# Patient Record
Sex: FEMALE | Race: BLACK OR AFRICAN AMERICAN | NOT HISPANIC OR LATINO | Employment: UNEMPLOYED | URBAN - METROPOLITAN AREA
[De-identification: names, ages, dates, MRNs, and addresses within clinical notes are randomized per-mention and may not be internally consistent; named-entity substitution may affect disease eponyms.]

---

## 2017-01-05 ENCOUNTER — TELEPHONE (OUTPATIENT)
Dept: NEUROLOGY | Facility: CLINIC | Age: 37
End: 2017-01-05

## 2017-01-05 NOTE — TELEPHONE ENCOUNTER
----- Message from Lisa Vela sent at 1/4/2017  3:52 PM CST -----  Contact: Vicki (Mom) [phone]  Mom is calling to book an appointment for daughter to see doctor mery peter call

## 2017-01-06 ENCOUNTER — TELEPHONE (OUTPATIENT)
Dept: NEUROLOGY | Facility: CLINIC | Age: 37
End: 2017-01-06

## 2017-01-06 NOTE — TELEPHONE ENCOUNTER
----- Message from Karmen Lao RN sent at 1/5/2017  4:58 PM CST -----  Contact: Patient's mother      ----- Message -----     From: Marissa Lisa     Sent: 1/5/2017   2:41 PM       To: Carlos BALDWIN Staff    Patient's mother returning call  About daughter's appointment.    Please note, mother mentioned patient now has medicaid    Please call her at [phone]

## 2017-01-06 NOTE — TELEPHONE ENCOUNTER
----- Message from Karmen Lao RN sent at 1/5/2017  4:58 PM CST -----  Contact: Patient's mother      ----- Message -----     From: Marissa Lisa     Sent: 1/5/2017   2:41 PM       To: Carlos BALDWIN Staff    Patient's mother returning call  About daughter's appointment.    Please note, mother mentioned patient now has medicaid    Please call her at [phone]

## 2017-01-27 ENCOUNTER — TELEPHONE (OUTPATIENT)
Dept: NEUROLOGY | Facility: CLINIC | Age: 37
End: 2017-01-27

## 2017-01-27 NOTE — TELEPHONE ENCOUNTER
----- Message from Kvng Hines sent at 1/26/2017  4:11 PM CST -----  Contact: Mother  Would like to schedule an appt, please call: [phone]

## 2017-01-27 NOTE — TELEPHONE ENCOUNTER
Call returned to Vicki (mother). Bettye has not been seen here since 3/2015 due to transportation issues, which have been resolved. She never did start the Copaxone that was prescribed back then. She is not currently having any acute issues but wanted to reestablish care here. She has not seen another neurologist for her MS and is not on DMT. She is scheduled for next available on 3/8/17 at 3pm. Appt reminder also postal mailed to them.

## 2017-03-08 ENCOUNTER — OFFICE VISIT (OUTPATIENT)
Dept: NEUROLOGY | Facility: CLINIC | Age: 37
End: 2017-03-08
Payer: MEDICAID

## 2017-03-08 VITALS
HEIGHT: 63 IN | DIASTOLIC BLOOD PRESSURE: 72 MMHG | HEART RATE: 85 BPM | SYSTOLIC BLOOD PRESSURE: 112 MMHG | WEIGHT: 173 LBS | BODY MASS INDEX: 30.65 KG/M2

## 2017-03-08 DIAGNOSIS — H49.20 6TH NERVE PALSY, UNSPECIFIED LATERALITY: ICD-10-CM

## 2017-03-08 DIAGNOSIS — Z71.89 COUNSELING REGARDING GOALS OF CARE: ICD-10-CM

## 2017-03-08 DIAGNOSIS — G35 MS (MULTIPLE SCLEROSIS): ICD-10-CM

## 2017-03-08 DIAGNOSIS — Z79.899 ENCOUNTER FOR LONG-TERM (CURRENT) USE OF HIGH-RISK MEDICATION: ICD-10-CM

## 2017-03-08 DIAGNOSIS — Z29.89 PROPHYLACTIC IMMUNOTHERAPY: ICD-10-CM

## 2017-03-08 DIAGNOSIS — G35 MULTIPLE SCLEROSIS EXACERBATION: Primary | ICD-10-CM

## 2017-03-08 PROCEDURE — 99999 PR PBB SHADOW E&M-EST. PATIENT-LVL III: CPT | Mod: PBBFAC,,, | Performed by: PSYCHIATRY & NEUROLOGY

## 2017-03-08 PROCEDURE — 99213 OFFICE O/P EST LOW 20 MIN: CPT | Mod: PBBFAC | Performed by: PSYCHIATRY & NEUROLOGY

## 2017-03-08 PROCEDURE — 99215 OFFICE O/P EST HI 40 MIN: CPT | Mod: S$PBB,,, | Performed by: PSYCHIATRY & NEUROLOGY

## 2017-03-08 RX ORDER — DEXAMETHASONE 6 MG/1
TABLET ORAL
Qty: 39 TABLET | Refills: 0 | Status: SHIPPED | OUTPATIENT
Start: 2017-03-08 | End: 2017-08-03 | Stop reason: SDUPTHER

## 2017-03-08 RX ORDER — METHYLPREDNISOLONE SODIUM SUCCINATE 1 G/16ML
INJECTION INTRAMUSCULAR; INTRAVENOUS
Qty: 3000 MG | Refills: 0 | Status: SHIPPED | OUTPATIENT
Start: 2017-03-08 | End: 2017-03-08

## 2017-03-08 NOTE — PROGRESS NOTES
Subjective:       Patient ID: Bettye Ch is a 36 y.o. female who presents today for a f/u for MS.  She is accompanied by her parents  MS HPI:  · DMT: N/A  · Side effects from DMT?   · Taking vitamin D3 as recommended? No Dose:   · I first saw pt 2 years ago in consultation.  We'd recommended Copaxone, but pt did not start it. She explains that she did not want to do the shots.   · Her parents bring her in today    SOCIAL HISTORY  Social History   Substance Use Topics    Smoking status: Never Smoker    Smokeless tobacco: Never Used    Alcohol use No     Living arrangements - the patient lives with her parents.  Employment not employed      Objective:        25 foot timed walk:  6.23 seconds without assist; in 3/2015, was 5.55 without assist;     Neurologic Exam      MENTAL STATUS:  There is moderate bradyphrenia; however, language is grossly    fluent with normal verbal comprehension, short-term and remote memory are    intact, attention is normal, patient is alert and oriented x3, fund of knowledge   appears grossly intact.  CRANIAL NERVE EXAM:  she has right 6th n paresis today; slight weakness of right face'   MOTOR EXAM:  She has slightly slow rapid sequential movements in the hands    bilaterally.  Upper extremity strength is grossly full.  Right and left lower    extremity flexors have mild weakness in all groups, graded at about 5-/5 in all    groups.  SENSORY EXAM:  She has a mild decrease in vibration sense in the distal lower    extremities bilaterally.  REFLEXES:  Are 2+ and symmetric throughout.  She has a left Babinski.  Right toe   is down.  COORDINATION:  She has slight dystaxia on finger-to-nose bilaterally.  GAIT:  Slightly wide based. Slower than at last visit      Labs:     Lab Results   Component Value Date    YHBNJRXV20WZ 17 (L) 03/24/2015    KJCMTFGC84OY <6.0 (L) 10/22/2010     Lab Results   Component Value Date    JCVINDEX SEE COMMENT (A) 03/24/2015    JCVANTIBODY SEE COMMENT  03/24/2015       Diagnosis/Assessment/Plan:    1. Multiple Sclerosis  · Assessment: I had a prolonged discussion with the pt and her parents about need for immunotherapy given the fact that her MRI 2 years ago showed diffuse julia positivity, given fact that she appears to having MS relapse now (eye movement restriction) and given fact that her timed walk is slower .   · Imaging: will order new MRIs of brain and C spine in [place]  · Disease Modifying Therapies: will treat acutely with 3 days of pulse high dose steroids; will start Rituxan as immunotherapy.  Risks, rationale and expectations of Rituxan therapy discussed with pt and her family; The patient was counseled about the risks associated with immune suppressive therapy, including a higher risk of serious infections and malignancy, as well as the importance of avoiding all live virus vaccines while on immune suppressive medication. The the patient was counseled about the importance of vitamin D supplementation in multiple sclerosis, and the fact that recent data suggests that high circulating blood levels of vitamin D has an ameliorating effect on the disease course in multiple sclerosis in general.        2. MS Symptom Assessment / Management--will be addressed in detail at next f/u appt    F/u Anju Christopher CNS in 8 weeks.  Ms Christopher was present for part of visit today.       Over 50% of this 40 minute visit was spent in direct face to face counseling of the patient and her family about her MS and rationale for starting immunotherapy. .        There are no diagnoses linked to this encounter.

## 2017-03-08 NOTE — MR AVS SNAPSHOT
[facility]  [address]  [place] [address]  Phone: [phone]                  Bettye Ch   3/8/2017 3:00 PM   Office Visit    Description:  Female : 1980   Provider:  Louise Gonzalez MD   Department:  [facility]           Reason for Visit     Neurologic Problem           Diagnoses this Visit        Comments    Multiple sclerosis exacerbation    -  Primary     MS (multiple sclerosis)                To Do List           Goals (5 Years of Data)     None       These Medications        Disp Refills Start End    dexamethasone (DECADRON) 6 MG tablet 39 tablet 0 3/8/2017     Take 13 tab po daily (all at once) x 3 days    Pharmacy: [facility] - [place], [place] - [address] Ph #: [phone]         [facility]     [facility] Care [facility] -  Assistance  Registered nurses in the [facility] provide clinical advisement, health education, appointment booking, and other advisory services.  Call for this free service at [phone].             Medications           Message regarding Medications     Verify the changes and/or additions to your medication regime listed below are the same as discussed with your clinician today.  If any of these changes or additions are incorrect, please notify your healthcare provider.        START taking these NEW medications        Refills    dexamethasone (DECADRON) 6 MG tablet 0    Sig: Take 13 tab po daily (all at once) x 3 days    Class: Normal           Verify that the below list of medications is an accurate representation of the medications you are currently taking.  If none reported, the list may be blank. If incorrect, please contact your healthcare provider. Carry this list with you in case of emergency.           Current Medications     dexamethasone (DECADRON) 6 MG tablet Take 13 tab po daily (all at once) x 3 days     "ergocalciferol (VITAMIN D2) 50,000 unit Cap Take 1 capsule (50,000 Units total) by mouth every 7 days.           Clinical Reference Information           Your Vitals Were     BP Pulse Height Weight BMI    112/72 85 5' 3" (1.6 m) 78.5 kg (173 lb) 30.65 kg/m2      Blood Pressure          Most Recent Value    BP  112/72      Allergies as of 3/8/2017     No Known Allergies      Immunizations Administered on Date of Encounter - 3/8/2017     None      Orders Placed During Today's Visit     Future Labs/Procedures Expected by Expires    CBC auto differential  3/8/2017 5/7/2018    HELPER-SUPPRESSOR RATIO  3/8/2017 5/7/2018    Hepatitis A antibody, IgG  3/8/2017 5/7/2018    Hepatitis B core antibody, total  3/8/2017 5/7/2018    Hepatitis B surface antibody  3/8/2017 5/7/2018    HEPATITIS B SURFACE ANTIGEN  3/8/2017 5/7/2018    HEPATITIS C ANTIBODY  3/8/2017 5/7/2018    HIV-1 and HIV-2 antibodies  3/8/2017 5/7/2018    QUANTIFERON GOLD TB  3/8/2017 5/7/2018    Vitamin B12  3/8/2017 5/7/2018    Comprehensive metabolic panel  As directed 3/8/2018    MRI Brain W WO Contrast  As directed 3/8/2018    MRI Cervical Spine W WO Cont  As directed 3/8/2018    Vitamin D  As directed 3/8/2018      Language Assistance Services     ATTENTION: Language assistance services are available, free of charge. Please call [phone].      ATENCIÓN: Si habla español, tiene a kapoor disposición servicios gratuitos de asistencia lingüística. Llame al [phone].     [facility] Ý: N?u b?n nói Ti?ng Vi?t, có các d?ch v? h? tr? ngôn ng? mi?n phí dành cho b?n. G?i s? [phone].         [facility] complies with applicable Federal civil rights laws and does not discriminate on the basis of race, color, national origin, age, disability, or sex.        "

## 2017-03-08 NOTE — PROGRESS NOTES
I met with Ms. Bettye Ch and her parents after her appointment with CATHERINE Gonzalez MD.  Her mother did remember speaking to the previous MS  and did follow through with recommendations that were provided.  Ms. Ch does have Medicaid and is receiving food stamps.  She applied for SSI but was denied because the family was not agreeable to completing a psychological evaluation per the recommendation of Social Security.  I explained the reason for the evaluation and how it could assist SSA in making a decision about her disability.  Ms. Ch may have time to appeal the SSI denial, so I encouraged the family to follow the instructions on the appeal letter, immediately so as not to lose the right to appeal.  I encouraged them to reconsider the psychological evaluation, and I encouraged them to consider obtaining legal representation.  My contact information was provided along with a jury duty excusal letter.

## 2017-03-09 ENCOUNTER — DOCUMENTATION ONLY (OUTPATIENT)
Dept: NEUROLOGY | Facility: CLINIC | Age: 37
End: 2017-03-09

## 2017-03-09 ENCOUNTER — TELEPHONE (OUTPATIENT)
Dept: NEUROLOGY | Facility: CLINIC | Age: 37
End: 2017-03-09

## 2017-03-09 NOTE — PROGRESS NOTES
Called mother , Marcy Ch to inform her that I was able to get Bettye in for her MRI's next week on 3/15 @ 12:30pm ([facility]) and also to remind her to get labs ([facility]) done prior before 10am. F/U in 8wks w/AP has been scheduled and will be mailed out to patient on 3/9/17.-KD

## 2017-03-09 NOTE — TELEPHONE ENCOUNTER
Labs scheduled on 3/15. Once labs reviewed, please let me know if okay to initiate Rituxan therapy plan.

## 2017-03-10 ENCOUNTER — TELEPHONE (OUTPATIENT)
Dept: NEUROLOGY | Facility: CLINIC | Age: 37
End: 2017-03-10

## 2017-03-10 NOTE — TELEPHONE ENCOUNTER
Incoming call from pt's mother, who is working on appealing pt's SSI denial.  She voiced concerns about the privacy of pt's records and I explained HIPPA and confidentiality.  She also had concerns about Dr. Gonzalez requesting HIV testing and I explained that certain testing must be completed prior to initiating certain medications, for the patient's safety.  Mother expressed understanding.

## 2017-03-10 NOTE — TELEPHONE ENCOUNTER
Returned call, Vicki said she had already confirmed dates and times of labs and MRI with another employee.

## 2017-03-10 NOTE — TELEPHONE ENCOUNTER
----- Message from Lisa Vela sent at 3/10/2017  8:23 AM CST -----  Contact: Vicki (mom) [phone]  Mom is calling to speak with nurse about the labs and MRI's scheduled for her daughter on 3/15/17.

## 2017-03-14 ENCOUNTER — TELEPHONE (OUTPATIENT)
Dept: RADIOLOGY | Facility: HOSPITAL | Age: 37
End: 2017-03-14

## 2017-03-15 ENCOUNTER — HOSPITAL ENCOUNTER (OUTPATIENT)
Dept: RADIOLOGY | Facility: HOSPITAL | Age: 37
Discharge: HOME | End: 2017-03-15
Attending: PSYCHIATRY & NEUROLOGY
Payer: MEDICAID

## 2017-03-15 DIAGNOSIS — G35 MULTIPLE SCLEROSIS EXACERBATION: ICD-10-CM

## 2017-03-15 PROCEDURE — A9585 GADOBUTROL INJECTION: HCPCS | Mod: PO | Performed by: PSYCHIATRY & NEUROLOGY

## 2017-03-15 PROCEDURE — 70553 MRI BRAIN STEM W/O & W/DYE: CPT | Mod: 26,,, | Performed by: RADIOLOGY

## 2017-03-15 PROCEDURE — 70553 MRI BRAIN STEM W/O & W/DYE: CPT | Mod: TC,PO

## 2017-03-15 PROCEDURE — 25500020 PHARM REV CODE 255: Mod: PO | Performed by: PSYCHIATRY & NEUROLOGY

## 2017-03-15 PROCEDURE — 72156 MRI NECK SPINE W/O & W/DYE: CPT | Mod: 26,,, | Performed by: RADIOLOGY

## 2017-03-15 PROCEDURE — 72156 MRI NECK SPINE W/O & W/DYE: CPT | Mod: TC,PO

## 2017-03-15 RX ORDER — GADOBUTROL 604.72 MG/ML
7.5 INJECTION INTRAVENOUS
Status: COMPLETED | OUTPATIENT
Start: 2017-03-15 | End: 2017-03-15

## 2017-03-15 RX ADMIN — GADOBUTROL 7.5 ML: 604.72 INJECTION INTRAVENOUS at 02:03

## 2017-03-20 ENCOUNTER — TELEPHONE (OUTPATIENT)
Dept: NEUROLOGY | Facility: CLINIC | Age: 37
End: 2017-03-20

## 2017-03-20 NOTE — TELEPHONE ENCOUNTER
----- Message from Louise Gonzalez MD sent at 3/19/2017  6:09 AM CDT -----  Rituxan labs look good.   She is anemic.   S/M kindly call pt's mother and let her know pt is anemic.  Suggest she follow up with PCP locally to address this.   Also, please advise her to take 10,000 IU of D3, quality brand.   Not sure they use the portal--kindly explore.  If they do, then will start using.

## 2017-03-24 RX ORDER — ACETAMINOPHEN 325 MG/1
650 TABLET ORAL
Status: CANCELLED | OUTPATIENT
Start: 2017-03-27

## 2017-03-24 RX ORDER — FAMOTIDINE 10 MG/ML
20 INJECTION INTRAVENOUS
Status: CANCELLED | OUTPATIENT
Start: 2017-03-27

## 2017-03-24 RX ORDER — HEPARIN 100 UNIT/ML
500 SYRINGE INTRAVENOUS
Status: CANCELLED | OUTPATIENT
Start: 2017-03-27

## 2017-03-24 RX ORDER — SODIUM CHLORIDE 0.9 % (FLUSH) 0.9 %
10 SYRINGE (ML) INJECTION
Status: CANCELLED | OUTPATIENT
Start: 2017-03-27

## 2017-03-27 ENCOUNTER — TELEPHONE (OUTPATIENT)
Dept: NEUROLOGY | Facility: CLINIC | Age: 37
End: 2017-03-27

## 2017-03-27 NOTE — TELEPHONE ENCOUNTER
----- Message from Louise Gonzalez MD sent at 3/19/2017  6:16 AM CDT -----  AP,   MRIs continue to show active MS.  Not surprising given now immunotherapy.  Kindly let pt's mother and pt know this, and emphasize importance of starting immunotherapy as planned.  Thanks

## 2017-04-12 NOTE — TELEPHONE ENCOUNTER
Notified from [facility] that Rituxan has been denied. A peer to peer option was given. I called and set up peer to peer. Informed them to call tomorrow between 8:30 and 1:30, but they were unable to tell me exactly when they would call. Provided Anju's number and notified Bettye's mom of denial.

## 2017-04-13 ENCOUNTER — TELEPHONE (OUTPATIENT)
Dept: NEUROLOGY | Facility: CLINIC | Age: 37
End: 2017-04-13

## 2017-04-13 NOTE — TELEPHONE ENCOUNTER
Received call from clinical pharmacy reviewer [facility]. She said that peer to peer at this level would not be helpful, as she is not authorized to approve Rituxan because it is considered experimental. She suggests moving forward with appeal. Phone number for appeals dept is [phone]. Request review by specialty reviewer/

## 2017-04-21 NOTE — TELEPHONE ENCOUNTER
I spoke to Kiet at length about plan to proceed now with Ocrevus. He understands and will relay this information to his wife. I think we can go ahead and start process for Ocrevus so as not to delay infusion once we are ready to go here.

## 2017-04-21 NOTE — TELEPHONE ENCOUNTER
----- Message from Lisa Vela sent at 4/21/2017 10:02 AM CDT -----  Contact: Kiet suggs [phone]  Patient is returning Anju's call.

## 2017-04-24 ENCOUNTER — TELEPHONE (OUTPATIENT)
Dept: NEUROLOGY | Facility: CLINIC | Age: 37
End: 2017-04-24

## 2017-04-24 NOTE — TELEPHONE ENCOUNTER
Left VM for patient to give our office a call to r/s her appointment with Anju.  Provider will be out of the office due to [employer]

## 2017-04-26 NOTE — TELEPHONE ENCOUNTER
Spoke with Bettye's mother. She wants to hold off on steroids, as Bettye has been stable. She will communicate with me if she changes her mind. She will also call back to reschedule the May appt soon.

## 2017-04-28 ENCOUNTER — TELEPHONE (OUTPATIENT)
Dept: NEUROLOGY | Facility: CLINIC | Age: 37
End: 2017-04-28

## 2017-04-28 NOTE — TELEPHONE ENCOUNTER
----- Message from Karmen Lao RN sent at 4/28/2017  2:22 PM CDT -----  Contact: Patient [phone]      ----- Message -----     From: Lisa Vela     Sent: 4/28/2017   2:03 PM       To: Ashwin LLOYD Staff    Patient is calling to r/s her appt due to Anju being out.

## 2017-05-16 ENCOUNTER — OFFICE VISIT (OUTPATIENT)
Dept: NEUROLOGY | Facility: CLINIC | Age: 37
End: 2017-05-16
Payer: MEDICAID

## 2017-05-16 VITALS
HEIGHT: 63 IN | BODY MASS INDEX: 31.71 KG/M2 | WEIGHT: 179 LBS | SYSTOLIC BLOOD PRESSURE: 104 MMHG | HEART RATE: 82 BPM | DIASTOLIC BLOOD PRESSURE: 66 MMHG

## 2017-05-16 DIAGNOSIS — Z71.89 COUNSELING REGARDING GOALS OF CARE: ICD-10-CM

## 2017-05-16 DIAGNOSIS — G35 MULTIPLE SCLEROSIS: Primary | ICD-10-CM

## 2017-05-16 DIAGNOSIS — R90.89 ABNORMAL BRAIN MRI: ICD-10-CM

## 2017-05-16 DIAGNOSIS — Z29.89 PROPHYLACTIC IMMUNOTHERAPY: ICD-10-CM

## 2017-05-16 DIAGNOSIS — Z79.899 HIGH RISK MEDICATION USE: ICD-10-CM

## 2017-05-16 PROCEDURE — 99999 PR PBB SHADOW E&M-EST. PATIENT-LVL III: CPT | Mod: PBBFAC,,, | Performed by: CLINICAL NURSE SPECIALIST

## 2017-05-16 PROCEDURE — 99213 OFFICE O/P EST LOW 20 MIN: CPT | Mod: PBBFAC | Performed by: CLINICAL NURSE SPECIALIST

## 2017-05-16 PROCEDURE — 99215 OFFICE O/P EST HI 40 MIN: CPT | Mod: S$PBB,,, | Performed by: CLINICAL NURSE SPECIALIST

## 2017-05-16 NOTE — PROGRESS NOTES
Subjective:       Patient ID: Bettye Ch is a 36 y.o. female who presents today for a routine clinic visit for MS. She was last seen by Dr. Gonzalez in March. She is accompanied by her parents. The history has been provided by the patient.     MS HPI:  · DMT: N/A; planning to start Ocrevus  · Taking vitamin D3 as recommended? No   · Eye movement has improved.   · In general, patient states that she feels better.   · She walks 1/8 mile almost every day.     SOCIAL HISTORY  Social History   Substance Use Topics    Smoking status: Never Smoker    Smokeless tobacco: Never Used    Alcohol use No     Living arrangements - the patient lives with her family.  Employment: not employed    MS ROS:  · Fatigue: No  · Sleep Disturbance: Yes - She has erratic sleep schedule, but feels rested.   · Bladder Dysfunction: Yes - She drinks a lot of water. She denies any UTIs.   · Bowel Dysfunction: No. Regular.   · Spasticity: No  · Visual Symptoms: No  · Cognitive: Her parents report that cognition is improved. She writes down a lot of things as reminders.   · Mood Disorder: No  · Gait Disturbance: Stable.   · Falls: No  · Hand Dysfunction: No  · Pain: No  · Sexual Dysfunction: Not Assessed  · Skin Breakdown: No  · Tremors: No  · Dysphagia:  No  · Dysarthria:  No  · Heat sensitivity:  No  · Any un-met adaptive needs? No  · Copay Assist?N/A        Objective:        1. 25 foot timed walk: 5.21 seconds today without assist. 6.23 seconds at last visit without assist     Neurologic Exam     Mental Status   Oriented to person, place, and time.   Attention: normal. Concentration: normal.   Speech: speech is normal   Level of consciousness: alert  Knowledge: good.     Cranial Nerves     CN III, IV, VI   Pupils are equal, round, and reactive to light.  Extraocular motions are normal.   Right pupil: Shape: regular. Reactivity: brisk.   Left pupil: Shape: regular. Reactivity: brisk.   CN III: no CN III palsy  CN VI: no CN VI  palsy  Nystagmus: none     CN V   Right facial sensation deficit: none  Left facial sensation deficit: none    CN VII   Right facial weakness: none  Left facial weakness: none    CN VIII   Hearing: intact    CN IX, X   Palate: symmetric    CN XI   Right sternocleidomastoid strength: normal  Left sternocleidomastoid strength: normal  Right trapezius strength: normal  Left trapezius strength: normal    CN XII   Tongue deviation: none    Motor Exam     Strength   Right neck flexion: 5/5  Left neck flexion: 5/5  Right neck extension: 5/5  Left neck extension: 5/5  Right deltoid: 5/5  Left deltoid: 5/5  Right biceps: 5/5  Left biceps: 5/5  Right triceps: 5/5  Left triceps: 5/5  Right wrist flexion: 5/5  Left wrist flexion: 5/5  Right wrist extension: 5/5  Left wrist extension: 5/5  Right interossei: 5/5  Left interossei: 5/5  Right iliopsoas: 5/5  Left iliopsoas: 5/5  Right quadriceps: 5/5  Left quadriceps: 5/5  Right hamstrin/5  Left hamstrin/5  Right anterior tibial: 5/5  Left anterior tibial: 5/5  Right gastroc: 5/5  Left gastroc: 5/5    Sensory Exam   Right arm vibration: normal  Left arm vibration: normal  Right leg vibration: decreased from knee  Left leg vibration: decreased from knee       Mild decrease in vibratory sense in bilateral lower extremities.      Gait, Coordination, and Reflexes     Gait  Gait: wide-based (slightly wide-based, but mostly steady )    Coordination   Romberg: negative  Finger to nose coordination: abnormal (slightly slow )  Tandem walking coordination: abnormal (slow)    Reflexes   Right brachioradialis: 2+  Left brachioradialis: 2+  Right biceps: 2+  Left biceps: 2+  Right patellar: 2+  Left patellar: 2+  Right achilles: 2+  Left achilles: 2+       Slightly slow rapid sequential movements in the hands bilaterally.            Imaging:   Images reviewed with the patient and her family.  Results for orders placed during the hospital encounter of 03/15/17   MRI Brain W WO Contrast     Addendum Addendum to technique section:  Multiplanar, multisequence MRI of the brain was performed prior to and  following administration of 7.5 cc IV Gadavist.   Electronically signed by: JAMIE PARKS MD Date:     03/16/17 Time:    08:39       Jamie Parks MD 3/16/2017  8:39 AM          Narrative MRI OF THE BRAIN WITH ANDWITHOUT CONTRAST:     Technique: Noncontrast sagittal T1, axial T1, axial T2, axial FLAIR, and axial diffusion weighted images of the brain were obtained. Additionally, T1 postcontrast axial, sagittal, and coronal sequences were acquired following the intravenous administration of  cc Gadavist.    Comparison: 2/24/2015    Findings:     Again demonstrated is extensive confluent T2/FLAIR hyperintense signal abnormality throughout the white matter bilaterally with involvement of the deep and periventricular white matter as well as the subcortical white matter, in keeping with known history of multiple sclerosis.  Diffuse involvement of the corpus callosum again noted. Overall extent of the T2/FLAIR hyperintense signal is very similar to prior with possible slight increased signal abnormality noted in the corona radiata of the right posterior frontal lobe and slight improved signal abnormality in the deep white matter of the right occipital lobe.  There is mild associated scattered DWI hyperintense signal throughout the deep and subcortical white matter of the right occipital, bilateral parietal, and bilateral posterior frontal lobes.  Patchy signal abnormality throughout the bilateral cerebellar peduncles, midbrain, ioana, and medulla appears essentially unchanged.    There appears to have been mixed interval response with resolution of multiple previously described foci of enhancement.  There are however multiple new small foci of enhancement throughout the bilateral corona radiata, subcortical white matter of the posterior left frontal lobe near the vertex, subcortical white matter of  the right occipital lobe and periventricular white matter adjacent to the occipital horn of the right lateral ventricle.  Primary pattern of enhancement is incomplete ringlike.      No evidence of acute infarction.  There is no hemorrhage or extra-axial collection.  The ventricles are normal in size and configuration.    The major intracranial flow voids are patent.    Impression      Extensive diffuse white matter signal abnormality as above, in keeping with known history of multiple sclerosis.  Overall extent of disease appears similar to prior however there has been mixed interval response with resolution of multiple previously described foci of enhancement and interval development of multiple new bilateral enhancing lesions as detailed above.  Findings are in keeping with active demyelination.      Electronically signed by: JAMIE PARKS MD  Date:     03/16/17  Time:    08:36      Results for orders placed during the hospital encounter of 03/15/17   MRI Cervical Spine W WO Cont    Narrative Technique: Multiplanar, multisequence MRI of the cervical spine was performed prior to and following administration of 7.5 cc IV Gadavist.    Comparison: 2/24/2015    Findings:    Vertebral body heights and alignment are within normal limits. Intervertebral disk spaces are well preserved. Bone marrow signal is normal.    Multifocal T2 hyperintense signal again demonstrated throughout the majority of the cervical cord extending from C2-C7, remaining most severe at the levels of C2-C3 through C5-C6.  Overall intensity of the signal abnormality has improved from prior with normalization cord caliber suggesting resolution of edema.  Previously described enhancement has resolved.  No new lesions or enhancement to suggest active demyelination.    Limited evaluation of the neck soft tissues is unremarkable.    Small broad-based posterior disc osteophyte complexes noted involving C2-C3 through C6-C7.  No associated spinal canal or  neuroforaminal stenosis.  No appreciable change from prior.    Impression 1.  Interval improvement in multifocal T2 hyperintense signal throughout the cervical cord as detailed above, in keeping with chronic demyelinating plaque in this patient with known history of multiple sclerosis.  No new lesions or evidence of active demyelination.    2.  Mild degenerative disc disease as above without spinal canal or neuroforaminal stenosis.      Electronically signed by: JAMIE PARKS MD  Date:     03/16/17  Time:    08:51          Labs:     Lab Results   Component Value Date    GMOLFXDR27QO 15 (L) 03/15/2017    PKUDZCHQ86BN 17 (L) 03/24/2015    SBOKFVTI34PP <6.0 (L) 10/22/2010     Lab Results   Component Value Date    JCVINDEX SEE COMMENT (A) 03/24/2015    JCVANTIBODY SEE COMMENT 03/24/2015     Lab Results   Component Value Date    YD3OXDGL 72.6 03/15/2017    ABSOLUTECD3 1472 03/15/2017    ZT6GDTOW 18.1 03/15/2017    ABSOLUTECD8 367 03/15/2017    CR1SDQTO 52.4 03/15/2017    ABSOLUTECD4 1062 03/15/2017    LABCD48 2.89 03/15/2017     Hep C Ab negative  Hep A IgG negative  Hep B Surface Ab negative  Hep B Surface Ag negative  Hep B Core Total Ab negative    HIV negative  Quantiferon negative   B12 normal    Lab Results   Component Value Date    WBC 7.59 03/15/2017    HGB 10.9 (L) 03/15/2017    HCT 35.9 (L) 03/15/2017    MCV 87 03/15/2017     (H) 03/15/2017     CMP  Sodium   Date Value Ref Range Status   03/15/2017 140 136 - 145 mmol/L Final     Potassium   Date Value Ref Range Status   03/15/2017 3.4 (L) 3.5 - 5.1 mmol/L Final     Chloride   Date Value Ref Range Status   03/15/2017 102 95 - 110 mmol/L Final     CO2   Date Value Ref Range Status   03/15/2017 25 23 - 29 mmol/L Final     Glucose   Date Value Ref Range Status   03/15/2017 89 70 - 110 mg/dL Final     BUN, Bld   Date Value Ref Range Status   03/15/2017 8 6 - 20 mg/dL Final     Creatinine   Date Value Ref Range Status   03/15/2017 0.6 0.5 - 1.4 mg/dL Final      Calcium   Date Value Ref Range Status   03/15/2017 9.6 8.7 - 10.5 mg/dL Final     Total Protein   Date Value Ref Range Status   03/15/2017 8.0 6.0 - 8.4 g/dL Final     Albumin   Date Value Ref Range Status   03/15/2017 3.8 3.5 - 5.2 g/dL Final     Total Bilirubin   Date Value Ref Range Status   03/15/2017 0.3 0.1 - 1.0 mg/dL Final     Comment:     For infants and newborns, interpretation of results should be based  on gestational age, weight and in agreement with clinical  observations.  Premature Infant recommended reference ranges:  Up to 24 hours.............<8.0 mg/dL  Up to 48 hours............<12.0 mg/dL  3-5 days..................<15.0 mg/dL  6-29 days.................<15.0 mg/dL       Alkaline Phosphatase   Date Value Ref Range Status   03/15/2017 80 55 - 135 U/L Final     AST   Date Value Ref Range Status   03/15/2017 9 (L) 10 - 40 U/L Final     ALT   Date Value Ref Range Status   03/15/2017 7 (L) 10 - 44 U/L Final     Anion Gap   Date Value Ref Range Status   03/15/2017 13 8 - 16 mmol/L Final     eGFR if    Date Value Ref Range Status   03/15/2017 >60.0 >60 mL/min/1.73 m^2 Final     eGFR if non    Date Value Ref Range Status   03/15/2017 >60.0 >60 mL/min/1.73 m^2 Final     Comment:     Calculation used to obtain the estimated glomerular filtration  rate (eGFR) is the CKD-EPI equation. Since race is unknown   in our information system, the eGFR values for   -American and Non--American patients are given   for each creatinine result.         Diagnosis/Assessment/Plan:    1. Multiple Sclerosis  · Assessment: Bettye's eye movements are normal today, and the remainder of her exam is stable. Her walk time has improved by 1 second. Overall, she feels well. Her recent MRI of the brain did show some active lesions, and she understands the importance of starting immune therapy at this time. Since things are stabilized, I do not think she needs any additional steroids.    · Imaging: Will plan for MRI brain and cervical spine 6 months after Ocrevus start.   · Disease Modifying Therapies: Since Rituxan has been denied by her insurance, we will plan to start Ocrevus. The patient was counseled about the risks associated with immune suppressive therapy, including a higher risk of serious infections (including PML) and malignancy (specifically breast cancer), as well as the importance of avoiding all live virus vaccines while on immune suppressive medication. We discussed side effects of infusion reactions (itching, rash, swelling or itchiness in the throat) and administration of pre-meds to lessen this response. We also discussed other possible risks of side effects of an allergic reaction, constipation/diarrhea, nausea/vomiting, local damage at the IV site, sores on the feet, and blood in the urine. The patient verbalized understanding of risk and signed consent form. She has completed all pre-screening labs, and HIV, hepatitis, and TB were all negative. Once we are able to administer Ocrevus here, we will call her to schedule infusion. She will need to do lab work for CBC and Rituxan sensitivity at month 1, month 4, and month 5, and she can do these locally.     2. MS Symptom Assessment / Management  · Sleep Disturbance: She feels that she gets enough sleep.   · Cognitive: Will monitor.   · Gait Disturbance: Improved.       Over 50% of this 60 minute visit was spent in direct face to face counseling of the patient about MS, her current symptoms, and plan to start Ocrevus. The patient and her family agree with the plan of care. I will see her back in about 10 weeks, and she will follow up with Dr. Gonzalez in late December or January after MRIs.         There are no diagnoses linked to this encounter.

## 2017-05-16 NOTE — MR AVS SNAPSHOT
"    [facility]  [address]  [facility] [address]  Phone: [phone]                  Bettye Ch   2017 1:30 PM   Office Visit    Description:  Female : 1980   Provider:  LAMAR Peter, CNS   Department:  [facility]           Reason for Visit     Neurologic Problem                To Do List           Future Appointments        Provider Department Dept Phone    2017 10:30 AM LAMAR Peter, CNS [facility] [phone]      Goals (5 Years of Data)     None      [facility]     [facility] Care [facility] -  Assistance  Unless otherwise directed by your provider, please contact [facility], our nurse care line that is available for  assistance.     Registered nurses in the [facility] provide: appointment scheduling, clinical advisement, health education, and other advisory services.  Call: [phone] (toll free)               Medications           Message regarding Medications     Verify the changes and/or additions to your medication regime listed below are the same as discussed with your clinician today.  If any of these changes or additions are incorrect, please notify your healthcare provider.             Verify that the below list of medications is an accurate representation of the medications you are currently taking.  If none reported, the list may be blank. If incorrect, please contact your healthcare provider. Carry this list with you in case of emergency.           Current Medications     CALCIUM CARBONATE/VITAMIN D3 (VITAMIN D-3 ORAL) Take 10,000 Units by mouth once daily.    dexamethasone (DECADRON) 6 MG tablet Take 13 tab po daily (all at once) x 3 days           Clinical Reference Information           Your Vitals Were     BP Pulse Height Weight BMI    104/66 82 5' 3" (1.6 m) 81.2 kg (179 lb) 31.71 kg/m2      Blood Pressure          Most Recent Value    BP  " 104/66      Allergies as of 5/16/2017     No Known Allergies      Immunizations Administered on Date of Encounter - 5/16/2017     None      Instructions    1.Vitamin D3 10,000 units dailt ([employer] Made brand)        Language Assistance Services     ATTENTION: Language assistance services are available, free of charge. Please call [phone].      ATENCIÓN: Si habla goldieañol, tiene a kapoor disposición servicios gratuitos de asistencia lingüística. Llame al [phone].     CHÚ Ý: N?u b?n nói Ti?ng Vi?t, có các d?ch v? h? tr? ngôn ng? mi?n phí dành cho b?n. G?i s? [phone].         [facility] complies with applicable Federal civil rights laws and does not discriminate on the basis of race, color, national origin, age, disability, or sex.

## 2017-08-02 ENCOUNTER — TELEPHONE (OUTPATIENT)
Dept: NEUROLOGY | Facility: CLINIC | Age: 37
End: 2017-08-02

## 2017-08-02 ENCOUNTER — LAB VISIT (OUTPATIENT)
Dept: LAB | Facility: HOSPITAL | Age: 37
End: 2017-08-02
Payer: MEDICAID

## 2017-08-02 ENCOUNTER — OFFICE VISIT (OUTPATIENT)
Dept: NEUROLOGY | Facility: CLINIC | Age: 37
End: 2017-08-02
Payer: MEDICAID

## 2017-08-02 VITALS
HEART RATE: 88 BPM | BODY MASS INDEX: 31.71 KG/M2 | SYSTOLIC BLOOD PRESSURE: 104 MMHG | HEIGHT: 63 IN | WEIGHT: 179 LBS | DIASTOLIC BLOOD PRESSURE: 69 MMHG

## 2017-08-02 DIAGNOSIS — R29.898 LEFT LEG WEAKNESS: ICD-10-CM

## 2017-08-02 DIAGNOSIS — G35 MULTIPLE SCLEROSIS EXACERBATION: ICD-10-CM

## 2017-08-02 DIAGNOSIS — G35 MULTIPLE SCLEROSIS: ICD-10-CM

## 2017-08-02 DIAGNOSIS — G35 MS (MULTIPLE SCLEROSIS): ICD-10-CM

## 2017-08-02 DIAGNOSIS — G35 MULTIPLE SCLEROSIS: Primary | ICD-10-CM

## 2017-08-02 DIAGNOSIS — R26.9 GAIT DISTURBANCE: ICD-10-CM

## 2017-08-02 DIAGNOSIS — Z71.89 COUNSELING REGARDING GOALS OF CARE: ICD-10-CM

## 2017-08-02 DIAGNOSIS — R30.0 DYSURIA: Primary | ICD-10-CM

## 2017-08-02 DIAGNOSIS — Z74.09 IMPAIRED MOBILITY: ICD-10-CM

## 2017-08-02 PROCEDURE — 99999 PR PBB SHADOW E&M-EST. PATIENT-LVL III: CPT | Mod: PBBFAC,,, | Performed by: CLINICAL NURSE SPECIALIST

## 2017-08-02 PROCEDURE — 99215 OFFICE O/P EST HI 40 MIN: CPT | Mod: S$PBB,,, | Performed by: CLINICAL NURSE SPECIALIST

## 2017-08-02 RX ORDER — METHYLPREDNISOLONE SODIUM SUCCINATE 1 G/16ML
INJECTION INTRAMUSCULAR; INTRAVENOUS
Qty: 5000 MG | Refills: 0 | Status: SHIPPED | OUTPATIENT
Start: 2017-08-02 | End: 2018-04-09 | Stop reason: CLARIF

## 2017-08-02 NOTE — TELEPHONE ENCOUNTER
Informed Vicki I spoke with Tuan at [employer] and was told the auth could take a few days. Vicki said at this time they will wait to see if it is approved through [employer]. Will follow up with [employer] to check status.

## 2017-08-02 NOTE — PROGRESS NOTES
"Subjective:       Patient ID: Bettye Ch is a 37 y.o. female who presents today for a routine clinic visit for MS. I last saw her on 5/16/17. The history has been provided by the patient. She is accompanied by her mom and brother.     MS HPI:  · DMT: N/A; planning to start Ocrevus  · Side effects from DMT? No  · Taking vitamin D3 as recommended? No.   · On Monday, Bettye started experiencing back pain, and her mother noticed that the left leg was dragging. She was unable to walk while in the grocery store on Monday. She has been using a walker at home to get up and go to the bathroom. She has been able to get up from her bed to her walker.   · She has some new numbness and tingling in her hands for the past few days.   · She has a dry cough every now and then, but denies any other recent infections.   · Her mother feels like her hands are swollen.     SOCIAL HISTORY  Social History   Substance Use Topics    Smoking status: Never Smoker    Smokeless tobacco: Never Used    Alcohol use No     Living arrangements - the patient lives with her family.  Employment:  Not employed    MS ROS:  · Fatigue: Yes - She has felt weak for the past few days.   · Sleep Disturbance: Yes - She has erratic sleep schedule, but generally feels rested.   · Bladder Dysfunction: Yes - She has some urinary urgency, but she denies incontinence or infections.   · Bowel Dysfunction: Yes - She has BM every 2-3 days.   · Spasticity: No  · Visual Symptoms: No  · Cognitive: Yes - Her family feels like she remembers things easily (but the patient commented during the visit that she doesn't remember things sometimes)  · Mood Disorder: Yes - She gets frustrated when she thinks about her symptoms and the fact that she has MS.   · Gait Disturbance: Yes - Her walk has been slow for the past few days, and she has been dragging her left leg.   · Falls: No  · Hand Dysfunction: Yes - Her hands feel "hard," and she has numbness and tingling. "   · Pain: Yes - She is having some lower back pain.   · Sexual Dysfunction: Not Assessed  · Skin Breakdown: No  · Tremors: No  · Dysphagia:  No  · Dysarthria:  No  · Heat sensitivity:  No  · Any un-met adaptive needs? Yes - She is interested in a transfer tub bench.   · Copay Assist?  N/A  ·           Objective:        1. 25 foot timed walk: 45 seconds today with walker; was 5.21 seconds at last visit without assist    Neurologic Exam     Mental Status   Oriented to person, place, and time.   Attention: normal. Concentration: normal.   Speech: speech is normal   Level of consciousness: alert  Knowledge: good.   Normal comprehension.     Cranial Nerves     CN III, IV, VI   Pupils are equal, round, and reactive to light.  Extraocular motions are normal.   Right pupil: Shape: regular. Reactivity: brisk.   Left pupil: Shape: regular. Reactivity: brisk.   CN III: no CN III palsy  CN VI: no CN VI palsy  Nystagmus: none     CN V   Right facial sensation deficit: none  Left facial sensation deficit: none    CN VII   Right facial weakness: none  Left facial weakness: none    CN VIII   Hearing: intact    CN IX, X   Palate: symmetric    CN XI   Right sternocleidomastoid strength: normal  Left sternocleidomastoid strength: normal  Right trapezius strength: normal  Left trapezius strength: normal    CN XII   Tongue deviation: none    Motor Exam     Strength   Right neck flexion: 5/5  Left neck flexion: 5/5  Right neck extension: 5/5  Left neck extension: 5/5  Right deltoid: 5/5  Left deltoid: 5/5  Right biceps: 5/5  Left biceps: 5/5  Right triceps: 5/5  Left triceps: 5/5  Right wrist flexion: 5/5  Left wrist flexion: 5/5  Right wrist extension: 5/5  Left wrist extension: 5/5  Right interossei: 5/5  Left interossei: 5/5  Right iliopsoas: 5/5  Right quadriceps: 5/5  Left quadriceps: 5/5  Right hamstrin/5  Left hamstrin/5  Right anterior tibial: 5/5  Left anterior tibial: 3/5  Right gastroc: 5/5  Left gastroc: 5/5  Left  iliopsoas 4+/5       Sensory Exam   Right arm vibration: normal  Left arm vibration: normal  Right leg vibration: decreased from knee  Left leg vibration: decreased from knee  Significant decrease in vibratory sense to BLE.     She feels cold more to the right foot.    Proprioception intact.      Gait, Coordination, and Reflexes     Gait  Gait: wide-based (She is dragging her left leg; walk is slow; she has difficulty rising from sitting to standing with assistance )    Coordination   Finger to nose coordination: abnormal (slow bilaterally  )  Tandem gait test: unable to perform today.    Reflexes   Right brachioradialis: 2+  Left brachioradialis: 2+  Right biceps: 2+  Left biceps: 2+  Right patellar: 2+  Left patellar: 2+  Right achilles: 2+  Left achilles: 2+Slightly slow rapid sequential movements in the hands bilaterally.            Labs:     Lab Results   Component Value Date    ULTCREQE08JC 15 (L) 03/15/2017    OYQTVPAK07ON 17 (L) 03/24/2015    BOHXRBSI79RK <6.0 (L) 10/22/2010     Lab Results   Component Value Date    JCVINDEX SEE COMMENT (A) 03/24/2015    JCVANTIBODY SEE COMMENT 03/24/2015     Lab Results   Component Value Date    QA5XBYXI 72.6 03/15/2017    ABSOLUTECD3 1472 03/15/2017    IR6UCTVZ 18.1 03/15/2017    ABSOLUTECD8 367 03/15/2017    MU1WMKMW 52.4 03/15/2017    ABSOLUTECD4 1062 03/15/2017    LABCD48 2.89 03/15/2017       Diagnosis/Assessment/Plan:    1. Multiple Sclerosis  · Assessment: I suspect that Bettye is having an MS relapse. To be remembered is that she had active lesions as recently as March 2017. She began experiencing trouble walking and numbness and tingling in her hands on Monday, which persist today. Her walk time is significantly slower than it was in May (40 seconds slower), and she has weakness in the left lower extremity that I did not appreciate at last visit. I would like to treat her with 5 days of Solumedrol 1g IV to start ASAP. We will try to get this set up locally either  through [facility] or [facility]. If neither of these places can service her, we will give Decadron 78mg x5 days. Mr. Ch is not currently on disease modifying therapy and is at higher risk of relapse because of this. She had previously been denied for Rituxan, so we were waiting on Ocrevus to be approved for administration here. It may be another month before we can infuse her here, so I discussed expediting the infusion with either [facility] in [place] or [facility] in [place]. The patient's mother would like to see how she does with steroids before deciding on this. I advised that it was my preference to expedite infusion, but will communicate with them after steroids to see how they want to proceed. We will screen for infection prior to starting steroids--CBC, CMP, UA-- to rule out pseudorelapse, although I doubt this is what's happening.    · Imaging: Will plan for MRI brain and cervical spine 6 months after Ocrevus start   · Disease Modifying Therapies: As above, Ocrevus is still not available to administer here at [facility]. Will plan to send order to [facility] or [facility] if family agrees. She has not yet started Vitamin D. I advised that she start taking 10,000 units of Vitamin D daily. We discussed the importance of high dose vitamin D and that higher doses of vitamin D have been linked to less MS disease activity.     2. MS Symptom Assessment / Management  · Bladder Dysfunction: Will check UA and culture today.   · Mood Disorder: She is tearful during the visit today. She expressed frustration about not understanding why this is happening. When she feels well, she is generally upbeat and positive.   · Gait Disturbance: As above, will treat with steroids.   · Hand Dysfunction: Her finger to nose coordination is slower on the left.  · Adaptive needs:  Order for transfer tub bench sent to [facility].     Over 50% of this 45 minute visit was spent in direct face to face  counseling of the patient about MS, her current symptoms, and plans for relapse treatment. I would like for her to follow up with Layla Yoder PA-C in October, since I will be out on maternity leave, and see Dr. Gonzalez in December. The patient and her family agree with the plan of care.       There are no diagnoses linked to this encounter.

## 2017-08-02 NOTE — TELEPHONE ENCOUNTER
----- Message from Kelby Linn sent at 8/2/2017  3:10 PM CDT -----  Contact: [place]icki ( [facility] [phone]   Caller is returning a missed call, pls return call

## 2017-08-02 NOTE — TELEPHONE ENCOUNTER
Call received from pt's mother. Pt is unable to urinate at this time. She would like orders sent to local [facility] for UA/cx.

## 2017-08-02 NOTE — TELEPHONE ENCOUNTER
Returned call to Zulay with the option to do IV steroids at [facility] or to have Decadron sent to their local pharmacy. Vicki preferred to do IV steroids at [facility]. She said if the infusion was not covered there, we could send Decadron to the local pharmacy. Faxed Solumedrol prescription, demographics, benefits, office notes to [facility] at [phone].

## 2017-08-03 ENCOUNTER — DOCUMENTATION ONLY (OUTPATIENT)
Dept: NEUROLOGY | Facility: CLINIC | Age: 37
End: 2017-08-03

## 2017-08-03 RX ORDER — DEXAMETHASONE 6 MG/1
TABLET ORAL
Qty: 65 TABLET | Refills: 0 | Status: SHIPPED | OUTPATIENT
Start: 2017-08-03 | End: 2018-03-31 | Stop reason: SDUPTHER

## 2017-08-03 NOTE — TELEPHONE ENCOUNTER
I spoke with Tuan at [facility]. She said they are going to doing Bettye's IV steroids to start tomorrow, 8/4. They are having the patient come into their infusion site to have the steroids, therefore she will not have them at home. Vicki aware not to  Decadron that was sent to the pharmacy.

## 2017-08-04 ENCOUNTER — TELEPHONE (OUTPATIENT)
Dept: NEUROLOGY | Facility: CLINIC | Age: 37
End: 2017-08-04

## 2017-08-04 NOTE — TELEPHONE ENCOUNTER
Call received from Hayde at [facility]. She states that pt has had 5+ IV attempts and a vein finder was also used without success. Anju is aware. Informed Hayde to instruct Vicki to have pt take Decadron already called into the pharmacy. Verbalizes understanding and will communicate this with pt's mother.

## 2017-08-04 NOTE — TELEPHONE ENCOUNTER
----- Message from Lisa Vela sent at 8/4/2017  8:31 AM CDT -----  Contact: Zulay bergman [phone]  Mom is calling to speak with nurse in regards to starting the 5 day IV on Monday 8/7/17. Please call as soon as possible.

## 2017-08-08 NOTE — TELEPHONE ENCOUNTER
----- Message from Sergo Avila sent at 8/8/2017 10:17 AM CDT -----  Contact: Self @ [phone]  Pt is requesting to speak with Moedsta. Pls call.

## 2017-08-08 NOTE — TELEPHONE ENCOUNTER
"Bettye started her steroids on Saturday and feels "about the same" according to Vicki. Vicki also said the urine sample was dropped off and results should be sent over today. She said although the orders were sent to [facility] by [facility], they did not go to [facility] to have them done. She will call me back with the name and contact number to the facility her urine was dropped off.  "

## 2017-08-08 NOTE — TELEPHONE ENCOUNTER
----- Message from Lisa Vela sent at 8/8/2017  8:24 AM CDT -----  Contact: Patient [phone]  Patient states she is returning Modesta's call.

## 2017-08-10 ENCOUNTER — DOCUMENTATION ONLY (OUTPATIENT)
Dept: NEUROLOGY | Facility: CLINIC | Age: 37
End: 2017-08-10

## 2017-08-11 ENCOUNTER — DOCUMENTATION ONLY (OUTPATIENT)
Dept: NEUROLOGY | Facility: CLINIC | Age: 37
End: 2017-08-11

## 2017-08-11 NOTE — PROGRESS NOTES
Received UA results from [facility] on 8/11/2017. Placed in KK folder due to AP being on vacation until 8/16/2017

## 2017-08-18 ENCOUNTER — TELEPHONE (OUTPATIENT)
Dept: NEUROLOGY | Facility: CLINIC | Age: 37
End: 2017-08-18

## 2017-08-18 DIAGNOSIS — Z78.9 IMPAIRED MOBILITY AND ADLS: ICD-10-CM

## 2017-08-18 DIAGNOSIS — Z74.09 IMPAIRED MOBILITY AND ADLS: ICD-10-CM

## 2017-08-18 DIAGNOSIS — G35 MULTIPLE SCLEROSIS: Primary | ICD-10-CM

## 2017-08-18 NOTE — TELEPHONE ENCOUNTER
----- Message from Lisa Vela sent at 8/17/2017  4:41 PM CDT -----  Contact: Zulay cade [phone]  Mom is requesting a call from nurse to find out if patient can get an order for PT. Please call

## 2017-08-18 NOTE — TELEPHONE ENCOUNTER
Per Anju, okay to order PT. Will wait to hear from Zulay to see where she would like the order faxed.

## 2017-08-21 ENCOUNTER — TELEPHONE (OUTPATIENT)
Dept: NEUROLOGY | Facility: CLINIC | Age: 37
End: 2017-08-21

## 2017-08-22 NOTE — TELEPHONE ENCOUNTER
I spoke with Vicki who said she is going to contact the insurance company today to find out with PT facilities are in network and will call to let us know. She said Bettye is still walking with a walker and said her ankle and toes are still weak and not bending. She also said she is able to bend her leg. She is hoping physical therapy will be helpful. She is also willing to have Bettye's Ocrevus infusions here at [facility].

## 2017-08-23 ENCOUNTER — TELEPHONE (OUTPATIENT)
Dept: NEUROLOGY | Facility: CLINIC | Age: 37
End: 2017-08-23

## 2017-08-23 RX ORDER — SODIUM CHLORIDE 0.9 % (FLUSH) 0.9 %
10 SYRINGE (ML) INJECTION
Status: CANCELLED | OUTPATIENT
Start: 2017-08-28

## 2017-08-23 RX ORDER — HEPARIN 100 UNIT/ML
100 SYRINGE INTRAVENOUS
Status: CANCELLED | OUTPATIENT
Start: 2017-08-28

## 2017-08-23 RX ORDER — ACETAMINOPHEN 325 MG/1
1000 TABLET ORAL
Status: CANCELLED | OUTPATIENT
Start: 2017-08-28 | End: 2017-08-28

## 2017-08-23 NOTE — TELEPHONE ENCOUNTER
----- Message from Yen Freire sent at 8/22/2017  2:45 PM CDT -----  Contact: gregory (mother) @ [phone]  pts mother is returning aKrmen's call concerning the Physical Therapy info that was requested.  Pls call.

## 2017-08-25 ENCOUNTER — TELEPHONE (OUTPATIENT)
Dept: NEUROLOGY | Facility: CLINIC | Age: 37
End: 2017-08-25

## 2017-08-25 NOTE — TELEPHONE ENCOUNTER
Received labs done on 8/4/17 at [facility].   UA with trace protein, 1+ ketones, SG >1.030, <10 epithelial cells, and yeast. No growth on culture

## 2017-08-28 NOTE — TELEPHONE ENCOUNTER
I spoke with Zulay and informed her Bettye is approved to receive Ocrevus. She said she would have to call back after talking with her  to let us know a date that would work for them. She also said she read herpes virus infections are a side effect of Ocrevus and wanted clarification on this prior to scheduling.

## 2017-08-29 NOTE — TELEPHONE ENCOUNTER
"I informed Vicki that Ocrevus does not cause herpes, but that if the patient has had an outbreak in the past, it could cause them to happen more often. Vicki said Bettye has never had a "cold sore" or genital herpes. She did want to let the clinic know Bettye has had shingles before. She said she was never treated, "they just went away."   "

## 2017-09-01 NOTE — TELEPHONE ENCOUNTER
Spoke with Bettye's mother. She verbalized understanding about risk of shingles/herpes with Ocrevus and is not interested in prophylaxis treatment.

## 2017-10-02 ENCOUNTER — TELEPHONE (OUTPATIENT)
Dept: NEUROLOGY | Facility: CLINIC | Age: 37
End: 2017-10-02

## 2017-10-02 NOTE — TELEPHONE ENCOUNTER
----- Message from Tierney Rob sent at 9/29/2017  4:43 PM CDT -----  Contact: Pt Bettye Ch [phone]  Pt is requesting to cancel and reschedule appt that is for this Monday, 10.2.17 at 10:45am due to transportation.    Pt may be reached at [phone].    Thank you.  LC

## 2017-10-19 ENCOUNTER — TELEPHONE (OUTPATIENT)
Dept: NEUROLOGY | Facility: CLINIC | Age: 37
End: 2017-10-19

## 2017-10-31 NOTE — TELEPHONE ENCOUNTER
Mailed the following letter to pt's mother :   Dear Mrs. Vicki Ch,    I hope this letter finds you and your family well.  At [facility] last follow up appointment in August 2017, Anju Christopher recommended physical therapy.  Our nurse, Karmen, was waiting to hear from you regarding an in-network physical therapy provider to which we could refer Bettye, but she hasnt heard back from you.  Similarly, I left a voicemail for you to call, regarding this matter, on October 19, 2017.  I havent heard back and wanted to let you know that we are still trying to connect Bettye to a physical therapist, but we need additional information from you.      Additionally, we havent heard back from you regarding rescheduling Bettyes follow up appointment or your decision regarding initiating Ocrevus.  Please call me, at your earliest convenience, to discuss these issues.      Sincerely,        Clara Miller, [facility]  Clinical

## 2017-11-02 NOTE — TELEPHONE ENCOUNTER
Pt's mother Vicki phoned today after receiving my old voicemail.  She apologized for missed appointments and lack of follow up stating that her  recently started a new job and hasn't been able to leave work to bring pt to [facility] (3 hour drive from [place]).  She advised that Bettye was on wait lists for PT but never received services, because it took too long.  She stated that pt has recovered and no longer needs PT services.  Discussed Ocrevus and she advised that she and Bettye have questions for the provider that they will discuss at their next visit.  I reminded Vicki of Bettye's 12/5 appointment with Dr. Gonzalez.  I encouraged her to call if anything changes/worsens with Bettye and also mentioned [facility] funding for transportation to clinic, but mother stated her  will drive them as soon as things settle down at his new job.

## 2017-12-05 ENCOUNTER — TELEPHONE (OUTPATIENT)
Dept: NEUROLOGY | Facility: CLINIC | Age: 37
End: 2017-12-05

## 2017-12-05 NOTE — TELEPHONE ENCOUNTER
----- Message from Yen Freire sent at 12/5/2017  4:45 PM CST -----  Contact: Vicki Ch (mother) @ [phone]  Pt was scheduled to f/u with Dr Gonzalez this morning at 9:40 and missed her appt due to transportation issues.  Pls call to reschedule.

## 2017-12-13 NOTE — TELEPHONE ENCOUNTER
Spoke with pt's mother Vicki.  She advised her  is still having difficulties taking off because of an issue at work.  Provided Vicki with the contact information for Medicaid [facility] non-ambulance non-emergency transportation number [phone] and explained the process for scheduling rides.  Advised Vicki to call me if she encounters any challenges and encouraged her to reschedule Bettye's follow up appointment.

## 2018-03-31 DIAGNOSIS — G35 MULTIPLE SCLEROSIS EXACERBATION: ICD-10-CM

## 2018-03-31 DIAGNOSIS — G35 MS (MULTIPLE SCLEROSIS): ICD-10-CM

## 2018-03-31 RX ORDER — DEXAMETHASONE 6 MG/1
TABLET ORAL
Qty: 39 TABLET | Refills: 0 | Status: SHIPPED | OUTPATIENT
Start: 2018-03-31 | End: 2018-09-21 | Stop reason: SDUPTHER

## 2018-04-04 ENCOUNTER — TELEPHONE (OUTPATIENT)
Dept: NEUROLOGY | Facility: CLINIC | Age: 38
End: 2018-04-04

## 2018-04-04 NOTE — TELEPHONE ENCOUNTER
Patient has no-showed or canceled three appointments over the last several months. I last saw her in August, and the plan was to start Ocrevus, but I do not think that this was done. She needs to be seen ASAP.

## 2018-04-04 NOTE — TELEPHONE ENCOUNTER
I called and spoke with Vicki, pt's mother, who said since starting the Decadron pt's symptoms are resolving. Advised they make an appt to be seen asap. Pt's mother said she would speak with her  and call us right back to schedule.

## 2018-04-04 NOTE — TELEPHONE ENCOUNTER
----- Message from Jamie Holloway MD sent at 3/31/2018  9:27 AM CDT -----  Patient's mother called and reported new onset of right exotropia and vision trouble.  Sent them some decadron but they will probably someone to touch base.

## 2018-04-05 NOTE — TELEPHONE ENCOUNTER
----- Message from Kelby Linn sent at 4/4/2018  4:13 PM CDT -----  Contact: Caleas (rayo ) @ [phone]  Caller is calling to schedule a f/u appt, pls call

## 2018-04-05 NOTE — TELEPHONE ENCOUNTER
Pt is scheduled to see Anju on Monday, 4/9/18 at 8:30. Offered 3:00/3:40 appointments with Dr. Gonzalez on 4/23, 4/25 and 4/30 in place of appt on 4/9/18. Pt's mom said she would speak with her  to see if any of the late appts on the above mentioned dates will work for them. She said right now they are having car issues, so would prefer not to have a late appointment.

## 2018-04-09 ENCOUNTER — TELEPHONE (OUTPATIENT)
Dept: NEUROLOGY | Facility: CLINIC | Age: 38
End: 2018-04-09

## 2018-04-09 ENCOUNTER — LAB VISIT (OUTPATIENT)
Dept: LAB | Facility: HOSPITAL | Age: 38
End: 2018-04-09
Payer: MEDICAID

## 2018-04-09 ENCOUNTER — OFFICE VISIT (OUTPATIENT)
Dept: NEUROLOGY | Facility: CLINIC | Age: 38
End: 2018-04-09
Payer: MEDICAID

## 2018-04-09 VITALS — WEIGHT: 189 LBS | BODY MASS INDEX: 37.11 KG/M2 | HEIGHT: 60 IN

## 2018-04-09 DIAGNOSIS — E55.9 VITAMIN D DEFICIENCY: ICD-10-CM

## 2018-04-09 DIAGNOSIS — Z71.89 COUNSELING REGARDING GOALS OF CARE: ICD-10-CM

## 2018-04-09 DIAGNOSIS — G35 MULTIPLE SCLEROSIS: Primary | ICD-10-CM

## 2018-04-09 DIAGNOSIS — G35 MULTIPLE SCLEROSIS: ICD-10-CM

## 2018-04-09 DIAGNOSIS — R41.89 COGNITIVE IMPAIRMENT: ICD-10-CM

## 2018-04-09 DIAGNOSIS — R63.5 WEIGHT GAIN: ICD-10-CM

## 2018-04-09 DIAGNOSIS — R26.9 GAIT DISTURBANCE: ICD-10-CM

## 2018-04-09 PROCEDURE — 99213 OFFICE O/P EST LOW 20 MIN: CPT | Mod: PBBFAC | Performed by: CLINICAL NURSE SPECIALIST

## 2018-04-09 PROCEDURE — 86480 TB TEST CELL IMMUN MEASURE: CPT

## 2018-04-09 PROCEDURE — 99215 OFFICE O/P EST HI 40 MIN: CPT | Mod: S$PBB,,, | Performed by: CLINICAL NURSE SPECIALIST

## 2018-04-09 PROCEDURE — 99999 PR PBB SHADOW E&M-EST. PATIENT-LVL III: CPT | Mod: PBBFAC,,, | Performed by: CLINICAL NURSE SPECIALIST

## 2018-04-09 PROCEDURE — 36415 COLL VENOUS BLD VENIPUNCTURE: CPT

## 2018-04-09 NOTE — PROGRESS NOTES
"Subjective:       Patient ID: Bettye Ch is a 37 y.o. female who presents today for a routine clinic visit for MS.  The history has been provided by the patient. She is accompanied by her parents and brother. She was last seen in August 2017.      MS HPI:  · DMT:  N/A  · Taking vitamin D3 as recommended? No  To be remembered is that Btetye was relapsing at last visit, and the plan was to get her started on Ocrevus ASAP. IV steroids were ordered at [facility], but the nurses there did not have success in finding a vein. She was ultimately treated with oral steroids. Her symptoms improved after steroid treatment. She missed her October and December appts here in clinic due to transportation issues. Clara Miller LCSW offered help to set up transportation.     Over Easter weekend, she developed blurry vision and involuntary movements of the right eye, and her balance was affected. Dr. Holloway, on call doctor that weekend, ordered Decadron. Symptoms again improved with steroids.     Over the past 8 months, she does not have a sense of a progressive decline.   Her mother points out that she has gained 60lbs in the past 3 years.       SOCIAL HISTORY  Social History   Substance Use Topics    Smoking status: Never Smoker    Smokeless tobacco: Never Used    Alcohol use No     Living arrangements - the patient lives with her family.  Employment: not employed     MS ROS:  · Fatigue: Yes - She does not have high energy, but she rests a lot.   · Sleep Disturbance: Yes - She has erratic sleep schedule, but generally sleeps well.   · Bladder Dysfunction: Yes - She has some urinary urgency; her mom states "she always waits until the last minute." She denies incontinence or infections.   · Bowel Dysfunction: Yes - BMs are regular.   · Spasticity: Yes. She states "My legs feel braced."   · Visual Symptoms: No  · Cognitive: Yes - Her father states "Her memory is not normal." She writes things down a lot.   · Mood " Disorder: No. She denies depression or anxiety.   · Gait Disturbance: Yes -Stable right now.   · Falls: No  · Hand Dysfunction: No dysfunction.  · Pain: Yes - She is having some lower back pain.   · Sexual Dysfunction: Not Assessed  · Skin Breakdown: No  · Tremors: No  · Dysphagia:  No. She has noticed some drool or spit coming out of her mouth at times. Her mother states that this happens when she wakes up in the morning.   · Dysarthria:  No  · Heat sensitivity:  No  · Any un-met adaptive needs? No.   · Copay Assist?  N/A  ·        Objective:        1. 25 foot timed walk: 6.89 seconds today without assist (wide-based); was 45 seconds at last visit with walker    Neurologic Exam     Mental Status   Oriented to person, place, and time.   Attention: normal. Concentration: normal.   Speech: speech is normal   Level of consciousness: alert  Knowledge: good.   Normal comprehension.     Cranial Nerves     CN II   Visual acuity: normal (20/20 OU )    CN III, IV, VI   Pupils are equal, round, and reactive to light.  Extraocular motions are normal.   Right pupil: Shape: regular. Reactivity: brisk.   Left pupil: Shape: regular. Reactivity: brisk.   CN III: no CN III palsy  CN VI: no CN VI palsy  Nystagmus: none     CN V   Right facial sensation deficit: none  Left facial sensation deficit: none    CN VII   Right facial weakness: none  Left facial weakness: none    CN VIII   Hearing: intact    CN IX, X   Palate: symmetric    CN XI   Right sternocleidomastoid strength: normal  Left sternocleidomastoid strength: normal  Right trapezius strength: normal  Left trapezius strength: normal    CN XII   Tongue deviation: none  Color plates--15/18 OS, 17/18 OD      Motor Exam     Strength   Right neck flexion: 5/5  Left neck flexion: 5/5  Right neck extension: 5/5  Left neck extension: 5/5  Right deltoid: 5/5  Left deltoid: 5/5  Right biceps: 5/5  Left biceps: 5/5  Right triceps: 5/5  Left triceps: 5/5  Right wrist flexion: 5/5  Left  wrist flexion: 5/5  Right wrist extension: 5/5  Left wrist extension: 5/5  Right interossei: 5/5  Left interossei: 5/5  Right iliopsoas: 5/5  Right quadriceps: 5/5  Left quadriceps: 5/5  Right hamstrin/5  Left hamstrin/5  Right anterior tibial: 5/5  Left anterior tibial: 5/5  Right gastroc: 5/5  Left gastroc: 5/5  Left iliopsoas 4+/5       Sensory Exam   Right arm vibration: normal  Left arm vibration: normal  Right leg vibration: decreased from knee  Left leg vibration: decreased from knee  Significant decrease in vibratory sense to BLE.     She feels cold more to the right foot.    Proprioception intact.      Gait, Coordination, and Reflexes     Gait  Gait: wide-based (Walk is somewhat slow  )    Coordination   Romberg: negative  Finger to nose coordination: abnormal (slow bilaterally  )  Tandem walking coordination: abnormal (unsteady )    Reflexes   Right brachioradialis: 2+  Left brachioradialis: 2+  Right biceps: 2+  Left biceps: 2+  Right triceps: 2+  Left triceps: 2+  Right patellar: 2+  Left patellar: 2+  Right achilles: 2+  Left achilles: 2+  Slightly slow rapid sequential movements in the hands and feet bilaterally.     She is able to walk on toes and heels with some assistance.          Imaging:     No new imaging to review today.     Labs:     Lab Results   Component Value Date    ECTNEZBT66BP 15 (L) 03/15/2017    ZVOCFAUP70EM 17 (L) 2015    HXGXWZCV71HT <6.0 (L) 10/22/2010     Lab Results   Component Value Date    WBC 5.44 2017    RBC 4.02 2017    HGB 10.6 (L) 2017    HCT 33.1 (L) 2017    MCV 82 2017    MCH 26.4 (L) 2017    MCHC 32.0 2017    RDW 15.6 (H) 2017     2017    MPV 10.8 2017    GRAN 3.4 2017    GRAN 62.6 2017    LYMPH 1.5 2017    LYMPH 27.2 2017    MONO 0.4 2017    MONO 7.9 2017    EOS 0.1 2017    BASO 0.02 2017    EOSINOPHIL 1.7 2017    BASOPHIL 0.4  08/02/2017     Sodium   Date Value Ref Range Status   08/02/2017 140 136 - 145 mmol/L Final     Potassium   Date Value Ref Range Status   08/02/2017 4.1 3.5 - 5.1 mmol/L Final     Comment:     *Slightly Hemolyzed     Chloride   Date Value Ref Range Status   08/02/2017 106 95 - 110 mmol/L Final     CO2   Date Value Ref Range Status   08/02/2017 22 (L) 23 - 29 mmol/L Final     Glucose   Date Value Ref Range Status   08/02/2017 85 70 - 110 mg/dL Final     BUN, Bld   Date Value Ref Range Status   08/02/2017 12 6 - 20 mg/dL Final     Creatinine   Date Value Ref Range Status   08/02/2017 0.7 0.5 - 1.4 mg/dL Final     Calcium   Date Value Ref Range Status   08/02/2017 9.6 8.7 - 10.5 mg/dL Final     Total Protein   Date Value Ref Range Status   08/02/2017 8.3 6.0 - 8.4 g/dL Final     Albumin   Date Value Ref Range Status   08/02/2017 3.9 3.5 - 5.2 g/dL Final     Total Bilirubin   Date Value Ref Range Status   08/02/2017 0.4 0.1 - 1.0 mg/dL Final     Comment:     For infants and newborns, interpretation of results should be based  on gestational age, weight and in agreement with clinical  observations.  Premature Infant recommended reference ranges:  Up to 24 hours.............<8.0 mg/dL  Up to 48 hours............<12.0 mg/dL  3-5 days..................<15.0 mg/dL  6-29 days.................<15.0 mg/dL       Alkaline Phosphatase   Date Value Ref Range Status   08/02/2017 90 55 - 135 U/L Final     AST   Date Value Ref Range Status   08/02/2017 13 10 - 40 U/L Final     ALT   Date Value Ref Range Status   08/02/2017 6 (L) 10 - 44 U/L Final     Anion Gap   Date Value Ref Range Status   08/02/2017 12 8 - 16 mmol/L Final     eGFR if    Date Value Ref Range Status   08/02/2017 >60.0 >60 mL/min/1.73 m^2 Final     eGFR if non    Date Value Ref Range Status   08/02/2017 >60.0 >60 mL/min/1.73 m^2 Final     Comment:     Calculation used to obtain the estimated glomerular filtration  rate (eGFR) is the  CKD-EPI equation. Since race is unknown   in our information system, the eGFR values for   -American and Non--American patients are given   for each creatinine result.       Lab Results   Component Value Date    COLORU Yellow 02/26/2015    APPEARANCEUA Clear 02/26/2015    PHUR 7.0 02/26/2015    SPECGRAV 1.010 02/26/2015    PROTEINUA Negative 02/26/2015    GLUCUA Negative 02/26/2015    KETONESU Negative 02/26/2015    BILIRUBINUA Negative 02/26/2015    OCCULTUA Negative 02/26/2015    NITRITE Negative 02/26/2015    UROBILINOGEN 1.0 02/26/2015    LEUKOCYTESUR Negative 02/26/2015         Diagnosis/Assessment/Plan:    1. Multiple Sclerosis  · Assessment: Bettye's exam is much better than it was back in August when she was last seen while relapsing, but her walk time is slower than it had been prior to that August relapse. She did not start Ocrevus after the last visit. After discussion, Bettye has decided to move forward with Ocrevus now. We discussed the purpose of the medication and the goal of reducing relapse rates and keeping her stable.   · Imaging: New MRI brain and cervical spine in [place] soon  · Disease Modifying Therapies: As above, we will start Ocrevus. The patient was counseled about the risks associated with immune suppressive therapy, including a higher risk of serious infections and malignancy, as well as the importance of avoiding all live virus vaccines while on immune suppressive medication. We discussed side effects of infusion reactions (itching, rash, swelling or itchiness in the throat) and administration of pre-meds to lessen this response. Start form and consent form were previously filled out. She will have pre-screening labs done again--HIV, TB, hepatitis, CBC.  Once lab results are received and confirmed as negative, we can schedule infusion. She understands that she will need to do lab work for CBC at month 1 and CBC and Rituxan sensitivity at month 5. She would like to set up  the infusions at [facility] in [place]. Recommend that she take 10,000 units of Vitamin D3 daily. Discussed the importance of high dose vitamin D and that higher doses of vitamin D have been linked to less MS disease activity. Will check Vitamin D level today.     2. MS Symptom Assessment / Management   --No changes made to symptom management plan today.    --Weight gain--We will check TSH today.     Over 50% of this 70 minute visit was spent in direct face to face counseling of the patient about MS, DMT considerations, and MS symptom management. The patient agrees with the plan of care. She will follow up with Dr. Gonzalez in 4 months.     Anju Christopher, [facility] HospitalNS-BC, MSCN        There are no diagnoses linked to this encounter.

## 2018-04-09 NOTE — TELEPHONE ENCOUNTER
----- Message from LAMAR Peter, CNS sent at 4/9/2018  4:38 PM CDT -----  Bettye would like to move forward with Ocrevus now. I believe she had been approved and ready to go in the past, but was not scheduled. Can we set her up at [facility] in [place]? If she needs to fill out any paperwor/consent again, please let me know. She had HIV, TB, and hepatitis lab work today.

## 2018-04-09 NOTE — Clinical Note
Bettye would like to move forward with Ocrevus now. I believe she had been approved and ready to go in the past, but was not scheduled. Can we set her up at [facility] in [place]? If she needs to fill out any paperwor/consent again, please let me know. She had HIV, TB, and hepatitis lab work today.

## 2018-04-11 LAB
MITOGEN NIL: 8.92 IU/ML
NIL: 0.02 IU/ML
TB ANTIGEN NIL: 0 IU/ML
TB ANTIGEN: 0.02 IU/ML
TB GOLD: NEGATIVE

## 2018-04-13 ENCOUNTER — TELEPHONE (OUTPATIENT)
Dept: RADIOLOGY | Facility: HOSPITAL | Age: 38
End: 2018-04-13

## 2018-04-16 ENCOUNTER — HOSPITAL ENCOUNTER (OUTPATIENT)
Dept: RADIOLOGY | Facility: HOSPITAL | Age: 38
Discharge: HOME | End: 2018-04-16
Attending: CLINICAL NURSE SPECIALIST
Payer: MEDICAID

## 2018-04-16 DIAGNOSIS — G35 MULTIPLE SCLEROSIS: ICD-10-CM

## 2018-04-16 DIAGNOSIS — I87.8 POOR VENOUS ACCESS: Primary | ICD-10-CM

## 2018-04-16 NOTE — TELEPHONE ENCOUNTER
Auth changed from [facility] to [facility]. Waiting reply from pre-service to see if it needs to be reworked, or if okay to reschedule.

## 2018-04-20 NOTE — TELEPHONE ENCOUNTER
I spoke with Vicki, who is aware Ocrevus has been approved. She would like to discuss IV access options with Anju before she proceeds with scheduling.

## 2018-05-01 NOTE — TELEPHONE ENCOUNTER
Left message for Ms. Ch to call me back. I would like to discuss Teya and order MRI brain and c-spine  without contrast.

## 2018-05-15 NOTE — TELEPHONE ENCOUNTER
I spoke with pt's mom. She would prefer we order the MRI's with contrast, [facility] attempt to get an IV again, but if they are unable to, then have them done without contrast.

## 2018-05-18 ENCOUNTER — TELEPHONE (OUTPATIENT)
Dept: NEUROLOGY | Facility: CLINIC | Age: 38
End: 2018-05-18

## 2018-05-18 NOTE — TELEPHONE ENCOUNTER
----- Message from LAMAR Peter, CNS sent at 4/11/2018  8:53 AM CDT -----  Hepatitis labs negative. HIV negative. TB Gold pending. TSH normal. Vitamin D is 10. Please stress importance of taking 10,000 units daily. Kidney and liver function look good. Ongoing anemia. She needs to see PCP or establish care with one if she doesn't have one currently. WBC=5.85 (good).

## 2018-05-18 NOTE — TELEPHONE ENCOUNTER
Mom reports pt is not taking Vitamin D3. Provided results and strongly encouraged she take Vitamin D3 10,000 Units daily, and that she establish care with a primary care physician to address her ongoing anemia. Pt's mom verbalized understanding.

## 2018-05-18 NOTE — TELEPHONE ENCOUNTER
I called pt's mom and discussed either moving forward with MRI's of the brain and C Spine with or with and without contrast. She is aware if we order with contrast and they are unable to get an IV, pt will have to reschedule MRI's to be done without contrast. Pt's mom will talk with her and her  and get back with us.

## 2018-09-14 ENCOUNTER — TELEPHONE (OUTPATIENT)
Dept: NEUROLOGY | Facility: CLINIC | Age: 38
End: 2018-09-14

## 2018-09-14 DIAGNOSIS — G35 MULTIPLE SCLEROSIS: Primary | ICD-10-CM

## 2018-09-14 NOTE — TELEPHONE ENCOUNTER
Bettye did not do MRIs that were planned after last visit. While she is here on Monday, I recommend that she try to get MRIs done. New orders placed for brain and c-spine. She had a hard time with IV access previously. If no IV is possible, MRIs can be done without contrast.

## 2018-09-14 NOTE — TELEPHONE ENCOUNTER
Bettye has been having visual issues on and off for the last two days. Pt is unable to describe the visual disturbance. She denies pain behind her eyes. Pt has also been having constant tremors in her right hand for a few weeks. The visual disturbance is not new, but the tremor is new.Pt denies any recent infections. Pt scheduled to see Anju on Monday, 9/17/18.

## 2018-09-14 NOTE — TELEPHONE ENCOUNTER
----- Message from Marline Yanez sent at 9/14/2018  1:59 PM CDT -----  Contact: Vicki (mother) @ [phone]  Calling to speak with someone in Dr. Gonzalez's office regarding the patients condition, says her vision is giving her issues also has other concerns and would like to schedule an appt with the doctor. Please call.

## 2018-09-14 NOTE — TELEPHONE ENCOUNTER
----- Message from Marline Yanez sent at 9/14/2018 10:17 AM CDT -----  Contact: Vicki (mother) @ [phone]  Calling to speak with someone in Dr. Gonzalez's office regarding her getting an appt for the patient and says that the patient is having some problems and needs to speak with someone. Please call.

## 2018-09-17 ENCOUNTER — HOSPITAL ENCOUNTER (OUTPATIENT)
Dept: RADIOLOGY | Facility: HOSPITAL | Age: 38
Discharge: HOME | End: 2018-09-17
Attending: CLINICAL NURSE SPECIALIST
Payer: MEDICAID

## 2018-09-17 ENCOUNTER — OFFICE VISIT (OUTPATIENT)
Dept: NEUROLOGY | Facility: CLINIC | Age: 38
End: 2018-09-17
Payer: MEDICAID

## 2018-09-17 ENCOUNTER — TELEPHONE (OUTPATIENT)
Dept: NEUROLOGY | Facility: CLINIC | Age: 38
End: 2018-09-17

## 2018-09-17 VITALS
BODY MASS INDEX: 34.37 KG/M2 | SYSTOLIC BLOOD PRESSURE: 108 MMHG | DIASTOLIC BLOOD PRESSURE: 66 MMHG | WEIGHT: 175.06 LBS | HEART RATE: 83 BPM | HEIGHT: 60 IN

## 2018-09-17 DIAGNOSIS — G35 MULTIPLE SCLEROSIS: ICD-10-CM

## 2018-09-17 DIAGNOSIS — H02.409 PTOSIS OF EYELID, UNSPECIFIED LATERALITY: ICD-10-CM

## 2018-09-17 DIAGNOSIS — G35 MULTIPLE SCLEROSIS: Primary | ICD-10-CM

## 2018-09-17 DIAGNOSIS — R25.1 TREMOR OF LEFT HAND: ICD-10-CM

## 2018-09-17 DIAGNOSIS — Z71.89 COUNSELING REGARDING GOALS OF CARE: ICD-10-CM

## 2018-09-17 DIAGNOSIS — H51.9 ABNORMAL EYE MOVEMENTS: ICD-10-CM

## 2018-09-17 DIAGNOSIS — E61.1 IRON DEFICIENCY: Primary | ICD-10-CM

## 2018-09-17 DIAGNOSIS — R26.9 GAIT DISTURBANCE: ICD-10-CM

## 2018-09-17 LAB
BILIRUB UR QL STRIP: NEGATIVE
CLARITY UR REFRACT.AUTO: ABNORMAL
COLOR UR AUTO: YELLOW
GLUCOSE UR QL STRIP: NEGATIVE
HGB UR QL STRIP: NEGATIVE
KETONES UR QL STRIP: NEGATIVE
LEUKOCYTE ESTERASE UR QL STRIP: ABNORMAL
MICROSCOPIC COMMENT: ABNORMAL
NITRITE UR QL STRIP: NEGATIVE
PH UR STRIP: 7 [PH] (ref 5–8)
PROT UR QL STRIP: NEGATIVE
RBC #/AREA URNS AUTO: 3 /HPF (ref 0–4)
SP GR UR STRIP: 1.02 (ref 1–1.03)
SQUAMOUS #/AREA URNS AUTO: 30 /HPF
URN SPEC COLLECT METH UR: ABNORMAL
UROBILINOGEN UR STRIP-ACNC: 2 EU/DL
WBC #/AREA URNS AUTO: 8 /HPF (ref 0–5)

## 2018-09-17 PROCEDURE — 99999 PR PBB SHADOW E&M-EST. PATIENT-LVL III: CPT | Mod: PBBFAC,,, | Performed by: CLINICAL NURSE SPECIALIST

## 2018-09-17 PROCEDURE — 99354 PR PROLONGED SVC, OUPT, 1ST HR: CPT | Mod: S$PBB,,, | Performed by: CLINICAL NURSE SPECIALIST

## 2018-09-17 PROCEDURE — 25500020 PHARM REV CODE 255: Performed by: CLINICAL NURSE SPECIALIST

## 2018-09-17 PROCEDURE — 70553 MRI BRAIN STEM W/O & W/DYE: CPT | Mod: TC

## 2018-09-17 PROCEDURE — 99213 OFFICE O/P EST LOW 20 MIN: CPT | Mod: PBBFAC,25 | Performed by: CLINICAL NURSE SPECIALIST

## 2018-09-17 PROCEDURE — 72156 MRI NECK SPINE W/O & W/DYE: CPT | Mod: TC

## 2018-09-17 PROCEDURE — 81001 URINALYSIS AUTO W/SCOPE: CPT

## 2018-09-17 PROCEDURE — 72156 MRI NECK SPINE W/O & W/DYE: CPT | Mod: 26,,, | Performed by: RADIOLOGY

## 2018-09-17 PROCEDURE — 72157 MRI CHEST SPINE W/O & W/DYE: CPT | Mod: 26,,, | Performed by: RADIOLOGY

## 2018-09-17 PROCEDURE — 70553 MRI BRAIN STEM W/O & W/DYE: CPT | Mod: 26,,, | Performed by: RADIOLOGY

## 2018-09-17 PROCEDURE — 99215 OFFICE O/P EST HI 40 MIN: CPT | Mod: S$PBB,,, | Performed by: CLINICAL NURSE SPECIALIST

## 2018-09-17 PROCEDURE — A9585 GADOBUTROL INJECTION: HCPCS | Performed by: CLINICAL NURSE SPECIALIST

## 2018-09-17 PROCEDURE — 72157 MRI CHEST SPINE W/O & W/DYE: CPT | Mod: TC

## 2018-09-17 RX ORDER — GADOBUTROL 604.72 MG/ML
8 INJECTION INTRAVENOUS
Status: COMPLETED | OUTPATIENT
Start: 2018-09-17 | End: 2018-09-17

## 2018-09-17 RX ADMIN — GADOBUTROL 8 ML: 604.72 INJECTION INTRAVENOUS at 05:09

## 2018-09-17 NOTE — PROGRESS NOTES
"Subjective:       Patient ID: Bettye Ch is a 38 y.o. female who presents today for a routine clinic visit for MS.  The history has been provided by the patient. She is accompanied by her parents. She was last seen in April 2018.     MS HPI:  · DMT: N/A  · Taking vitamin D3 as recommended? Yes -  Dose: 2 gummies daily (not sure of the dose).  · She reports that she is having trouble with her vision. Her left eyelid is droopy. She denies that she is seeing double or that her vision is blurry; she has trouble explaining her visual disturbance  · She has had shaking in her left hand for at least a month. She does not try to pick things up with the left hand.   · Her walking has been impacted for the last 8-10 days.   · Her parents report that she fell out of her bed about 1.5 weeks ago.     SOCIAL HISTORY  Social History     Tobacco Use    Smoking status: Never Smoker    Smokeless tobacco: Never Used   Substance Use Topics    Alcohol use: No    Drug use: No     Living arrangements - the patient lives with her family.  Employment: disabled     MS ROS:  · Fatigue: Yes - She "has energy when she needs it."   · Sleep Disturbance: Yes - She has erratic sleep schedule, but generally sleeps well.   · Bladder Dysfunction: Yes -She denies incontinence or infections.   · Bowel Dysfunction: Yes - BMs are regular. It is easy to pass the stool.   · Spasticity: She denies cramping or tightness in her legs.   · Visual Symptoms: No  · Cognitive: Yes - She states "I don't try to remember much." Her father says that her memory has been "pretty good."   · Mood Disorder: No. She denies depression or anxiety.   · Gait Disturbance: Yes -As above.   · Falls: As above.   · Hand Dysfunction: Yes, left hand tremor impairs function.   · Pain: Yes - She is having some lower back pain.   · Sexual Dysfunction: Not Assessed  · Skin Breakdown: No  · Tremors: Yes, left hand tremor is constant lately  · Dysphagia:  No. "   · Dysarthria:  No  · Heat sensitivity:  No  · Any un-met adaptive needs? No.   · Copay Assist?  N/A      Objective:        1. 25 foot timed walk: 6.86 seconds today without assist (wide-based and shuffling); was 6.89 seconds today without assist   Neurologic Exam     Mental Status   Oriented to person, place, and time.   Attention: normal. Concentration: normal.   Speech: speech is normal   Level of consciousness: alert  Knowledge: good.   Normal comprehension.     Cranial Nerves     CN II   Visual acuity: normal (20/40 OD; 20/50 OS)    CN III, IV, VI   Pupils are equal, round, and reactive to light.  Right pupil: Shape: regular.   Left pupil: Shape: regular.   Nystagmus: none     CN V   Right facial sensation deficit: none  Left facial sensation deficit: none    CN VII   Right facial weakness: none  Left facial weakness: none    CN VIII   Hearing: intact    CN IX, X   Palate: symmetric    CN XI   Right sternocleidomastoid strength: normal  Left sternocleidomastoid strength: normal  Right trapezius strength: normal  Left trapezius strength: normal    CN XII   Tongue deviation: none  Left eye ptosis and eye deviation; she denies double vision     Motor Exam     Strength   Right neck flexion: 5/5  Left neck flexion: 5/5  Right neck extension: 5/5  Left neck extension: 5/5  Right deltoid: 5/5  Left deltoid: 5/5  Right biceps: 5/5  Left biceps: 5/5  Right triceps: 5/5  Left triceps: 5/5  Right wrist flexion: 5/5  Left wrist flexion: 5/5  Right wrist extension: 5/5  Left wrist extension: 5/5  Right interossei: 5/5  Left interossei: 5/5  Right iliopsoas: 5/5  Left iliopsoas: 5/5  Right quadriceps: 5/5  Left quadriceps: 5/5  Right hamstrin/5  Left hamstrin/5  Right anterior tibial: 5/5  Left anterior tibial: 5/5  Right gastroc: 5/5  Left gastroc: 5/5         Sensory Exam   Right arm vibration: normal  Left arm vibration: normal  Right leg vibration: decreased from knee  Left leg vibration: decreased from  knee  Mild to moderate decrease in vibratory sense to BLE.          Gait, Coordination, and Reflexes     Gait  Gait: shuffling and wide-based    Coordination   Romberg: negative  Finger to nose coordination: abnormal (slow bilaterally with moderate dysmetria )  Heel to shin coordination: abnormal  Tandem walking coordination: abnormal (unsteady )    Reflexes   Right brachioradialis: 2+  Left brachioradialis: 2+  Right biceps: 2+  Left biceps: 2+  Right triceps: 2+  Left triceps: 2+  Right patellar: 2+  Left patellar: 2+  Right achilles: 2+  Left achilles: 2+  Slightly slow rapid sequential movements in the hands and feet bilaterally (more impaired with left hand)           Imaging:     No new MRIs to review.     Labs:     Lab Results   Component Value Date    CQHZYJVX84NT 10 (L) 04/09/2018    DHZGEKKX60XB 15 (L) 03/15/2017    YCWNAFUW71PI 17 (L) 03/24/2015     Lab Results   Component Value Date    SZ3JDWAU 72.6 03/15/2017    ABSOLUTECD3 1472 03/15/2017    LP4EWXSQ 18.1 03/15/2017    ABSOLUTECD8 367 03/15/2017    BD3SVCDT 52.4 03/15/2017    ABSOLUTECD4 1062 03/15/2017    LABCD48 2.89 03/15/2017     Lab Results   Component Value Date    WBC 5.85 04/09/2018    RBC 3.92 (L) 04/09/2018    HGB 10.4 (L) 04/09/2018    HCT 34.1 (L) 04/09/2018    MCV 87 04/09/2018    MCH 26.5 (L) 04/09/2018    MCHC 30.5 (L) 04/09/2018    RDW 15.2 (H) 04/09/2018     04/09/2018    MPV 10.8 04/09/2018    GRAN 3.6 04/09/2018    GRAN 60.7 04/09/2018    LYMPH 1.5 04/09/2018    LYMPH 26.0 04/09/2018    MONO 0.6 04/09/2018    MONO 9.9 04/09/2018    EOS 0.2 04/09/2018    BASO 0.01 04/09/2018    EOSINOPHIL 2.9 04/09/2018    BASOPHIL 0.2 04/09/2018     Sodium   Date Value Ref Range Status   04/09/2018 138 136 - 145 mmol/L Final     Potassium   Date Value Ref Range Status   04/09/2018 3.9 3.5 - 5.1 mmol/L Final     Chloride   Date Value Ref Range Status   04/09/2018 103 95 - 110 mmol/L Final     CO2   Date Value Ref Range Status   04/09/2018 26 23  - 29 mmol/L Final     Glucose   Date Value Ref Range Status   04/09/2018 91 70 - 110 mg/dL Final     BUN, Bld   Date Value Ref Range Status   04/09/2018 9 6 - 20 mg/dL Final     Creatinine   Date Value Ref Range Status   04/09/2018 0.6 0.5 - 1.4 mg/dL Final     Calcium   Date Value Ref Range Status   04/09/2018 9.0 8.7 - 10.5 mg/dL Final     Total Protein   Date Value Ref Range Status   04/09/2018 7.8 6.0 - 8.4 g/dL Final     Albumin   Date Value Ref Range Status   04/09/2018 3.9 3.5 - 5.2 g/dL Final     Total Bilirubin   Date Value Ref Range Status   04/09/2018 0.5 0.1 - 1.0 mg/dL Final     Comment:     For infants and newborns, interpretation of results should be based  on gestational age, weight and in agreement with clinical  observations.  Premature Infant recommended reference ranges:  Up to 24 hours.............<8.0 mg/dL  Up to 48 hours............<12.0 mg/dL  3-5 days..................<15.0 mg/dL  6-29 days.................<15.0 mg/dL       Alkaline Phosphatase   Date Value Ref Range Status   04/09/2018 82 55 - 135 U/L Final     AST   Date Value Ref Range Status   04/09/2018 9 (L) 10 - 40 U/L Final     ALT   Date Value Ref Range Status   04/09/2018 8 (L) 10 - 44 U/L Final     Anion Gap   Date Value Ref Range Status   04/09/2018 9 8 - 16 mmol/L Final     eGFR if    Date Value Ref Range Status   04/09/2018 >60.0 >60 mL/min/1.73 m^2 Final     eGFR if non    Date Value Ref Range Status   04/09/2018 >60.0 >60 mL/min/1.73 m^2 Final     Comment:     Calculation used to obtain the estimated glomerular filtration  rate (eGFR) is the CKD-EPI equation.        Diagnosis/Assessment/Plan:    1. Multiple Sclerosis  · Assessment: Bettye has left ptosis, decreased visual acuity bilaterally, and left hand tremor. It does seem to me that she is relapsing. She has MRIs of brain and C/T spine with and without contrast later today. If she has active lesions, we will certainly treat with steroids.  She has not yet started Ocrevus as DMT, and we spent a large portion of today's visit discussing the purpose of this medication, side effects, risks/benefits, etc. At this point, her parents seem interested in proceeding, and Bettye agrees. I reviewed the consent form and start form with her today, and she signed these papers. We discussed referral to ID for vaccine management in light of immunosuppression, and she and her parents are agreeable to this. Additional ID labs ordered for VZV and Stronglyloides. All other ID labs (TB, hepatitis, HIV) were done late last year. Since she has ptosis and left eye deviation, I have ordered a myasthenia gravis panel. Her mother also requests that we order B12 and iron since she has known anemia. Vitamin D level ordered. In reviewing the lab mimics of MS that have been collected in the past, it looks like RF was never checked, so I have done this today. She also had a borderline ACE level in the past (very high end of normal), so we will do these today (patient and mother's request, although I explained that there was little medical rationale to do this).   · Imaging: MRI brain/C-spine 6 months from Ocrevus start.   · Disease Modifying Therapies: As above, we will plan to start Ocrevus. I reinforced the importance of being on some disease modifying therapy to the patient and her parents (prevent relapses or clinical worsening and prevent formation of new or active lesions on MRI). I will check in with them tomorrow after MRIs are resulted and confirm that she wants me to send in start forms for Ocrevus. She would like to infuse at [facility] in [place].     2. MS Symptom Assessment / Management  · Visual Symptoms: Visual acuity is worse. Will likely treat with steroids once MRIs result.   · Bladder: Will check UA in clinic today.   · Cognitive:  She looks to her parents to make decisions about her treatment with Ocrevus. After I fully explained the details of the medication and  "brought the papers for her to sign, she said "What's Ocrevus?" I do think there is a cognitive impairment.   · Tremors: Left hand tremor is noticeable. I will wait to see what MRIs show, but will likely plan to treat with steroids.       Over 50% of this 70 minute visit was spent in direct face to face counseling of the patient about MS, DMT considerations, and MS symptom management. The patient agrees with the plan of care. She will follow up with Dr. Gonzalez in January.     Anju Christopher, [facility]-BC, MSCN      There are no diagnoses linked to this encounter.      "

## 2018-09-17 NOTE — TELEPHONE ENCOUNTER
----- Message from Kelby Linn sent at 9/17/2018  4:03 PM CDT -----  Contact: Vicki ( mom ) @ [phone]  Mom is returning a missed call, pls return call , caller states they're still at the [facility]

## 2018-09-18 DIAGNOSIS — G35 MULTIPLE SCLEROSIS: Primary | ICD-10-CM

## 2018-09-18 RX ORDER — METHYLPREDNISOLONE SODIUM SUCCINATE 1 G/16ML
INJECTION INTRAMUSCULAR; INTRAVENOUS
Qty: 5000 MG | Refills: 0 | Status: SHIPPED | OUTPATIENT
Start: 2018-09-18 | End: 2019-05-14 | Stop reason: ALTCHOICE

## 2018-09-19 ENCOUNTER — TELEPHONE (OUTPATIENT)
Dept: NEUROLOGY | Facility: CLINIC | Age: 38
End: 2018-09-19

## 2018-09-19 DIAGNOSIS — G35 MULTIPLE SCLEROSIS EXACERBATION: ICD-10-CM

## 2018-09-19 DIAGNOSIS — G35 MS (MULTIPLE SCLEROSIS): ICD-10-CM

## 2018-09-19 NOTE — TELEPHONE ENCOUNTER
----- Message from Ankush Campos sent at 9/19/2018 10:29 AM CDT -----  Contact: Vicki (Mom) [phone]  Needs Advice    Reason for call:Vicki called to speak to someone regarding the patient's  Medication. Please contact the patient's mother to discuss further.          Communication Preference:PHONE    Additional Information:

## 2018-09-19 NOTE — TELEPHONE ENCOUNTER
Spoke with Bettye's mother and informed her that Solumedrol IV has been approved through her insurance, and she will be contacted by [facility] in [place] regarding scheduling.   They have not yet made a decision regarding Ocrevus. I urged them to make their decision by Friday and let me know how they would like to proceed.

## 2018-09-20 ENCOUNTER — TELEPHONE (OUTPATIENT)
Dept: NEUROLOGY | Facility: CLINIC | Age: 38
End: 2018-09-20

## 2018-09-20 NOTE — TELEPHONE ENCOUNTER
----- Message from Sergo Avila sent at 9/20/2018  2:11 PM CDT -----  Needs Advice    Reason for call: Layla is asking to speak w/ someone on staff regarding the referral they received        Communication Preference: Layla in Intake w/ [employer] @ [phone]    Additional Information:

## 2018-09-20 NOTE — TELEPHONE ENCOUNTER
Returned call to [email or web address] at [email or web address]. She inquired about Ocrevus prescription. I informed her the family has not made a final decision about Ocrevus. Advised they reach out to pt to schedule her IV Solumedrol. Layla verbalized understanding.

## 2018-09-21 ENCOUNTER — TELEPHONE (OUTPATIENT)
Dept: NEUROLOGY | Facility: CLINIC | Age: 38
End: 2018-09-21

## 2018-09-21 RX ORDER — DEXAMETHASONE 6 MG/1
TABLET ORAL
Qty: 39 TABLET | Refills: 0 | Status: SHIPPED | OUTPATIENT
Start: 2018-09-21 | End: 2019-05-14 | Stop reason: ALTCHOICE

## 2018-09-21 NOTE — TELEPHONE ENCOUNTER
I spoke with Bettye's mother, and she reports that Bettye has not been scheduled with [facility] until Tuesday, and this is more of a delay than we had anticipated. I will send in 3 days of Decadron 78mg over the weekend and touch base with her on Monday. If she is feeling better, we can cancel steroids with [facility]. If not, perhaps we can proceed with 2 days IV or so (instead of the planned 5).     Bettye's mother would like to go to [facility] before making decision on whether or not to infuse Ocrevus there. I will be speaking with her mother frequently next week.

## 2018-09-24 ENCOUNTER — DOCUMENTATION ONLY (OUTPATIENT)
Dept: NEUROLOGY | Facility: CLINIC | Age: 38
End: 2018-09-24

## 2018-09-25 ENCOUNTER — TELEPHONE (OUTPATIENT)
Dept: NEUROLOGY | Facility: CLINIC | Age: 38
End: 2018-09-25

## 2018-09-25 NOTE — TELEPHONE ENCOUNTER
----- Message from Marline Yanez sent at 9/25/2018  8:03 AM CDT -----  Contact: Vicki (mother) @ [phone]  Calling to speak with Dr. Christopher regarding the patient's condition prior to her going to do her treatment @11am today. Please call.

## 2018-09-26 NOTE — TELEPHONE ENCOUNTER
Spoke with Vicki. Bettye and her family have decided to proceed with the Ocrevus and would like for her to get the infusion at [facility] in [place]. I advised that we will send paperwork in to [facility] and send referral to [facility].

## 2018-09-26 NOTE — TELEPHONE ENCOUNTER
I spoke with Bettye's mother, and she is agreeable to moving forward with Ocrevus. [facility] in [place] does not have a pharmacy, so the medication would either need to be shipped to the patient and brought in on the day of the infusion, or it could be overnighted from the [place] office to arrive by about 10:30am at [facility].

## 2018-09-27 ENCOUNTER — DOCUMENTATION ONLY (OUTPATIENT)
Dept: NEUROLOGY | Facility: CLINIC | Age: 38
End: 2018-09-27

## 2018-09-27 NOTE — TELEPHONE ENCOUNTER
Bettye is receiving 4th and final dose of Solumedrol tomorrow.   We will send in [employer] paperwork today.     I informed her mom of renal cyst finding on T-spine MRI, and we will watch this for now. Can consider repeat T-spine MRI at 6 month lili on Ocrevus.   Her mother is also aware of referral to hematology at [facility] in [place] for iron deficiency anemia.

## 2018-09-28 ENCOUNTER — TELEPHONE (OUTPATIENT)
Dept: NEUROLOGY | Facility: CLINIC | Age: 38
End: 2018-09-28

## 2018-09-28 NOTE — TELEPHONE ENCOUNTER
----- Message from Marline Yanez sent at 9/28/2018  1:36 PM CDT -----  Contact: Vicki (mother)   Calling to speak with someone in Dr. Christopher's office regarding what the patient is to do for tomorrow regarding her treatment. Please call.

## 2018-09-28 NOTE — TELEPHONE ENCOUNTER
----- Message from Yen Freire sent at 9/28/2018  2:21 PM CDT -----  Contact: Layla Alvarez    [phone]  Calling to see if  pts IV therapy will finish today or tomorrow.  Pls call.

## 2018-09-28 NOTE — TELEPHONE ENCOUNTER
Call returned to Layla and informed her that pt not to receive day 5. Today should be last day for solumedrol. Verbalizes understanding.

## 2018-09-28 NOTE — TELEPHONE ENCOUNTER
As discussed, Bettye does not need to do day 5 of Solumedrol tomorrow. I discussed with her mother yesterday that today would be her 4th and final day, as she has already gotten 3 days of Decadron prior to Solumedrol.

## 2018-10-02 ENCOUNTER — OFFICE VISIT (OUTPATIENT)
Dept: INFECTIOUS DISEASES | Facility: CLINIC | Age: 38
End: 2018-10-02
Payer: MEDICAID

## 2018-10-02 ENCOUNTER — CLINICAL SUPPORT (OUTPATIENT)
Dept: INFECTIOUS DISEASES | Facility: CLINIC | Age: 38
End: 2018-10-02
Payer: MEDICAID

## 2018-10-02 VITALS
BODY MASS INDEX: 33.84 KG/M2 | DIASTOLIC BLOOD PRESSURE: 64 MMHG | WEIGHT: 179.25 LBS | TEMPERATURE: 99 F | SYSTOLIC BLOOD PRESSURE: 105 MMHG | HEIGHT: 61 IN | HEART RATE: 80 BPM

## 2018-10-02 DIAGNOSIS — Z71.85 VACCINE COUNSELING: ICD-10-CM

## 2018-10-02 DIAGNOSIS — Z92.25 PERSONAL HISTORY OF IMMUNOSUPRESSION THERAPY: ICD-10-CM

## 2018-10-02 DIAGNOSIS — G35 MULTIPLE SCLEROSIS: Primary | ICD-10-CM

## 2018-10-02 PROCEDURE — 99213 OFFICE O/P EST LOW 20 MIN: CPT | Mod: PBBFAC,25 | Performed by: INTERNAL MEDICINE

## 2018-10-02 PROCEDURE — 90662 IIV NO PRSV INCREASED AG IM: CPT | Mod: PBBFAC

## 2018-10-02 PROCEDURE — 99999 PR PBB SHADOW E&M-EST. PATIENT-LVL III: CPT | Mod: PBBFAC,,, | Performed by: INTERNAL MEDICINE

## 2018-10-02 PROCEDURE — 90734 MENACWYD/MENACWYCRM VACC IM: CPT | Mod: PBBFAC

## 2018-10-02 PROCEDURE — 99203 OFFICE O/P NEW LOW 30 MIN: CPT | Mod: S$PBB,,, | Performed by: INTERNAL MEDICINE

## 2018-10-02 PROCEDURE — 90670 PCV13 VACCINE IM: CPT | Mod: PBBFAC

## 2018-10-02 PROCEDURE — 90636 HEP A/HEP B VACC ADULT IM: CPT | Mod: PBBFAC

## 2018-10-02 PROCEDURE — 90715 TDAP VACCINE 7 YRS/> IM: CPT | Mod: PBBFAC

## 2018-10-02 NOTE — PROGRESS NOTES
Ms. Ch received initial dose Hep A/B, Flu HD, Tdap, Menactra, and Prevnar 13  vaccines and tolerated them well. She left he unit in [facility].

## 2018-10-02 NOTE — TELEPHONE ENCOUNTER
Faxed Ocrevus rx, last office note, demographics and labs to [facility] at [phone]. Faxed SMN/BAUTISTA to [facility].

## 2018-10-02 NOTE — PROGRESS NOTES
Pre Biologic Response Modifier Therapy Consult  BMR Recipient Evaluation    Requesting Physician: neurology    Reason for Visit: Vaccine counseling     History of Present Illness  38 y.o. female with advanced Multiple Sclerosis on Ocrevus presents for vaccine counseling. Had recent relapse (ptosis, decreased visual acuity, hand tremor) treated with steroids. Patient denies any recent fever, chills, or infective infective illnesses.    Reviewed labs:   Varicella igG immune  Strongyloides negative  HCV ab negative  HBV not immune  HAV not immune  IGRA negative  hiv negative  rpr negative    1) Do you have a history of:         YES NO   Diabetes   []        [x]     Autoimmune disease  [x]        []multiple sclerosis   Cancer              []        [x]   Surgical Removal of Spleen []        [x]       2) Have you had recurrent infections:             YES NO  Sinus infections  []        [x]   Lung infections  []        [x]              Urinary Tract Infections []        [x]                                              Intestinal Infections  []        [x]      Skin Infections   []        [x]       Musculoskeletal Infections    []        [x]   Reproductive Infections []        [x]   Periodontal Disease  []        [x]        3)Have you ever had: YES     NO       Chicken Pox   [x]         []          Shingles   [x]         []            Orolabial Herpes             []         [x]          Genital Herpes  []         [x]           Genital Warts   []         [x]             Cytomegalovirus  []         [x]          Randy-Barr Virus  [x]         []              Hepatitis A   []         [x]          Hepatitis B   []         [x]          Hepatitis C   []         [x]            Syphilis   []         [x]          Gonorrhea   []         [x]         Chlamydia    []         [x]           Parasites / worms  []         [x]         Fungal Infections  []         [x]         Bloodstream Infections []         [x]             4) Tuberculosis              YES NO  Exposure to person with active TB?  []         [x]   Have you ever had a positive PPD?      []         [x]   If yes, what treatment did you receive:     5) Travel    What states have you lived in? [place]    What countries have you visited for more than 2 weeks? no                                 YES     NO  Did you have any associated infections?   []         [x]     Are you planning to travel outside of ?    []          [x]     6) Animal Exposure                  YES NO  Do you have pets living in your house?   []         [x]   If yes, describe:     Do you spend time or live on a farm?    []         [x]   If yes, which ones:    Do you have a fish tank?         []  [x]    Do you have a litter box?     []         [x]     Do you fish or hunt?      []         [x]   Do you clean or skin fish or animals?    []         [x]     Consume raw or undercooked meat, fish, shellfish?  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             YES     NO  Do you garden or otherwise work in the soil?   []         [x]   Do you hike, camp, or spend time in wooded areas?  []         [x]       9) The patient's immunization history was reviewed.     Have you ever received:  YES NO DATES  Routine Childhood vaccines  [x]         []       Influenza vaccine   []         [x]     Prevnar    []         [x]     Pneumovax    []         [x]     Tetanus-diptheria -pertussis  [x]         [] 2000    Hepatitis A vaccine series       [x]         []  Not immune   Hepatitis B vaccine series         [x]         []   not immune  Meningitis vaccine   []         [x]     Zoster vaccine    []         [x]        Significant labs reviewed:  HepA Ab neg  HepBs Ab neg  HepBs Ag neg  HepBc Ab neg  HepC Ab neg    HIV neg  RPR neg  Quant gold neg    Pending labs:      Assessment:     Multiple sclerosis on ocrevus  Immunosupression  Vaccine  counseling    Plan:     Biologic Response Modifier Candidacy:   Based on available information, there are no identified significant barriers to BRMs from an infectious disease standpoint pending acceptable serologies.      Counseling:  - I discussed with the patient the risk for increased susceptibility to infections following BRM therapy including increased risk for infection.    - Specific guidance has been provided to the patient regarding the patients occupation, hobbies and activities to avoid future infectious complications including but not limited to avoiding undercooked meats and seafood, proper hygiene, and contact with animals.  - The patients has been counseled on the importance of vaccinations including but not limited to a yearly flu vaccine.    Immunizations:  Based on the patients immunization history and serologies, immunizations were ordered:  - Influenza vaccine  - Prevnar  - Pneumovax 2 months  - TDaP  - Twinrix 0, 1, 6 months  - Shingrix 0, 2 months   - Menactra 0, 2 months              The patient was encouraged to contact us about any problems that may develop after immunization and possible side effects were reviewed.

## 2018-10-02 NOTE — LETTER
October 2, 2018      Anju Christopher, LAMAR, CNS  [address]  [facility] [address]           [facility]  [address]  [facility] [address]  Phone: [phone]  Fax: [phone]          Patient: Bettye Ch   MR Number: [id number]   YOB: 1980   Date of Visit: 10/2/2018       Dear Anju Christopher:    Thank you for referring Bettye Ch to me for evaluation. Attached you will find relevant portions of my assessment and plan of care.    If you have questions, please do not hesitate to call me. I look forward to following Bettye Ch along with you.    Sincerely,    Riddhi Oakes MD    Enclosure  CC:  No Recipients    If you would like to receive this communication electronically, please contact [email or web address] or [phone] to request more information on [employer] Link access.    For providers and/or their staff who would like to refer a patient to [facility], please contact us through our one-stop-shop provider referral line, [facility], at [phone].    If you feel you have received this communication in error or would no longer like to receive these types of communications, please e-mail [email or web address]

## 2018-10-02 NOTE — PATIENT INSTRUCTIONS
Today:  - Influenza  - Twinrix #1 (Hepatitis A/B)  - Prevnar (Pneumonia #1)  - TDaP (good for 10 years)  - Shingrix #1 (Shingles)  - Menactra #1 (Meningitis)    1 month:  - Twinrix #2 (Hepatitis A/B)     2 months:  - Pneumovax (Pneumonia #2)  - Shingrix #2 (Shingles)  - Menactra #2 (Meningitis)    6 months:  - Twinrix #3 (Hepatitis A/B)

## 2018-10-03 ENCOUNTER — DOCUMENTATION ONLY (OUTPATIENT)
Dept: NEUROLOGY | Facility: CLINIC | Age: 38
End: 2018-10-03

## 2018-10-04 ENCOUNTER — TELEPHONE (OUTPATIENT)
Dept: NEUROLOGY | Facility: CLINIC | Age: 38
End: 2018-10-04

## 2018-10-04 NOTE — TELEPHONE ENCOUNTER
Returned call to pt's mom, notifying us pt has not received the shingrix vaccine because the hospital did not currently have it in stock. She asked if she could get the shingles vaccine locally. Advised pt's mom to contact Dr. Oakes in [place] for a shingrix rx, if she prefers to have it done locally, as we would not want Bettye receiving a live vaccine. Also, informed pt's mom it's okay to schedule pt's initial Ocevus infusions. Pt's mom verbalized understanding.

## 2018-10-04 NOTE — TELEPHONE ENCOUNTER
----- Message from Marline Yanez sent at 10/4/2018 10:43 AM CDT -----  Contact: Vicki (mother) @ [phone]  Calling to speak with Dr. Christopher regarding the injection that the patient was to have 10/2, says it was not available. Asking what is the plan of care now. Please call.

## 2018-10-09 NOTE — TELEPHONE ENCOUNTER
Confirmed with [facility] that pt is scheduled for her initial Ocrevus infusions on 10/11/18 and 10/25/18.

## 2018-10-10 ENCOUNTER — TELEPHONE (OUTPATIENT)
Dept: NEUROLOGY | Facility: CLINIC | Age: 38
End: 2018-10-10

## 2018-10-10 ENCOUNTER — DOCUMENTATION ONLY (OUTPATIENT)
Dept: NEUROLOGY | Facility: CLINIC | Age: 38
End: 2018-10-10

## 2018-10-10 DIAGNOSIS — N28.1 RENAL CYST: Primary | ICD-10-CM

## 2018-10-10 DIAGNOSIS — R93.7 ABNORMAL MRI, SPINE: ICD-10-CM

## 2018-10-10 NOTE — TELEPHONE ENCOUNTER
----- Message from Sergo Avila sent at 10/10/2018 11:53 AM CDT -----  Needs Advice    Reason for call: Pt is asking to speak w/ Anju about scheduling an infusion tomorrow. Pt is asking for a call back before the clinic closes at 5 PM.        Communication Preference: [phone]    Additional Information:

## 2018-10-15 ENCOUNTER — DOCUMENTATION ONLY (OUTPATIENT)
Dept: NEUROLOGY | Facility: CLINIC | Age: 38
End: 2018-10-15

## 2018-10-30 ENCOUNTER — TELEPHONE (OUTPATIENT)
Dept: NEUROLOGY | Facility: CLINIC | Age: 38
End: 2018-10-30

## 2018-10-30 DIAGNOSIS — G35 MULTIPLE SCLEROSIS: Primary | ICD-10-CM

## 2018-11-05 ENCOUNTER — CLINICAL SUPPORT (OUTPATIENT)
Dept: INFECTIOUS DISEASES | Facility: CLINIC | Age: 38
End: 2018-11-05
Payer: MEDICAID

## 2018-11-05 ENCOUNTER — TELEPHONE (OUTPATIENT)
Dept: NEUROLOGY | Facility: CLINIC | Age: 38
End: 2018-11-05

## 2018-11-05 ENCOUNTER — HOSPITAL ENCOUNTER (OUTPATIENT)
Dept: RADIOLOGY | Facility: HOSPITAL | Age: 38
Discharge: HOME | End: 2018-11-05
Attending: CLINICAL NURSE SPECIALIST
Payer: MEDICAID

## 2018-11-05 DIAGNOSIS — N28.1 RENAL CYST: ICD-10-CM

## 2018-11-05 DIAGNOSIS — R93.7 ABNORMAL MRI, SPINE: ICD-10-CM

## 2018-11-05 PROCEDURE — 76770 US EXAM ABDO BACK WALL COMP: CPT | Mod: 26,,, | Performed by: INTERNAL MEDICINE

## 2018-11-05 PROCEDURE — 90636 HEP A/HEP B VACC ADULT IM: CPT | Mod: PBBFAC

## 2018-11-05 PROCEDURE — 76770 US EXAM ABDO BACK WALL COMP: CPT | Mod: TC

## 2018-11-05 NOTE — TELEPHONE ENCOUNTER
Call placed to Vicki. States that pt has not been to hematologist yet and but has been taking iron daily. States she will call the clinic to get pt scheduled. Advised her that referral was faxed over on 9/20/18.

## 2019-01-21 ENCOUNTER — OFFICE VISIT (OUTPATIENT)
Dept: NEUROLOGY | Facility: CLINIC | Age: 39
End: 2019-01-21
Payer: MEDICAID

## 2019-01-21 ENCOUNTER — CLINICAL SUPPORT (OUTPATIENT)
Dept: INFECTIOUS DISEASES | Facility: CLINIC | Age: 39
End: 2019-01-21
Payer: MEDICAID

## 2019-01-21 VITALS
SYSTOLIC BLOOD PRESSURE: 122 MMHG | WEIGHT: 178.44 LBS | DIASTOLIC BLOOD PRESSURE: 79 MMHG | BODY MASS INDEX: 33.69 KG/M2 | HEART RATE: 86 BPM | HEIGHT: 61 IN

## 2019-01-21 DIAGNOSIS — G35 MS (MULTIPLE SCLEROSIS): Primary | ICD-10-CM

## 2019-01-21 DIAGNOSIS — E55.9 VITAMIN D DEFICIENCY: ICD-10-CM

## 2019-01-21 DIAGNOSIS — Z74.09 IMPAIRED MOBILITY AND ADLS: ICD-10-CM

## 2019-01-21 DIAGNOSIS — Z78.9 IMPAIRED MOBILITY AND ADLS: ICD-10-CM

## 2019-01-21 DIAGNOSIS — Z29.89 PROPHYLACTIC IMMUNOTHERAPY: ICD-10-CM

## 2019-01-21 DIAGNOSIS — Z79.899 ENCOUNTER FOR LONG-TERM (CURRENT) USE OF HIGH-RISK MEDICATION: ICD-10-CM

## 2019-01-21 DIAGNOSIS — R26.9 GAIT DISTURBANCE: ICD-10-CM

## 2019-01-21 DIAGNOSIS — F09 COGNITIVE DYSFUNCTION ACCOMPANYING MULTIPLE SCLEROSIS: ICD-10-CM

## 2019-01-21 DIAGNOSIS — G35 COGNITIVE DYSFUNCTION ACCOMPANYING MULTIPLE SCLEROSIS: ICD-10-CM

## 2019-01-21 DIAGNOSIS — Z71.89 COUNSELING REGARDING GOALS OF CARE: ICD-10-CM

## 2019-01-21 PROCEDURE — 99999 PR PBB SHADOW E&M-EST. PATIENT-LVL IV: CPT | Mod: PBBFAC,,, | Performed by: PSYCHIATRY & NEUROLOGY

## 2019-01-21 PROCEDURE — 99215 PR OFFICE/OUTPT VISIT, EST, LEVL V, 40-54 MIN: ICD-10-PCS | Mod: S$PBB,,, | Performed by: PSYCHIATRY & NEUROLOGY

## 2019-01-21 PROCEDURE — 99215 OFFICE O/P EST HI 40 MIN: CPT | Mod: S$PBB,,, | Performed by: PSYCHIATRY & NEUROLOGY

## 2019-01-21 PROCEDURE — 90734 MENACWYD/MENACWYCRM VACC IM: CPT | Mod: PBBFAC

## 2019-01-21 PROCEDURE — 99999 PR PBB SHADOW E&M-EST. PATIENT-LVL IV: ICD-10-PCS | Mod: PBBFAC,,, | Performed by: PSYCHIATRY & NEUROLOGY

## 2019-01-21 PROCEDURE — 99214 OFFICE O/P EST MOD 30 MIN: CPT | Mod: PBBFAC,25 | Performed by: PSYCHIATRY & NEUROLOGY

## 2019-01-21 PROCEDURE — 90750 HZV VACC RECOMBINANT IM: CPT | Mod: PBBFAC

## 2019-01-21 PROCEDURE — 90471 IMMUNIZATION ADMIN: CPT | Mod: PBBFAC

## 2019-01-21 RX ORDER — FERROUS SULFATE 325(65) MG
325 TABLET, DELAYED RELEASE (ENTERIC COATED) ORAL ONCE
COMMUNITY
End: 2021-02-23

## 2019-01-21 NOTE — Clinical Note
Team, all set for next Ocrevus in April at [facility] in [place]; labs ordered in March here at , as well as MRI; will recheck Hep B; she's seen [facility];

## 2019-01-21 NOTE — PROGRESS NOTES
Ms. Ch received Shingrix(first dose), Pneumococcal 23, and Menactra vaccines. Tolerated well. Left unit in NAD.

## 2019-01-21 NOTE — Clinical Note
Keshav Elizabeth, This pt has never been able to work b/c MS diagnosis at young age. She is cognitively impaired and lives with her parents. Does not have SSI. Mom says they applied a few years ago, and were denied but have not reapplied.  Would you kindly reach out to them to give them advice? No emergency; Korina can help with this no doubt thanks

## 2019-01-21 NOTE — PROGRESS NOTES
Subjective:       Patient ID: Bettye Ch is a 38 y.o. female who presents today for a routine clinic visit for MS.  She is accompanied by her parents.     MS HPI:  · D[facility] [place]; tolerated this well;   · Taking vitamin D3 as recommended? Yes -  Dose: 4,000 IU day (2, 2,000 IU gummies);   · Pt denies any new neurologic symptoms in recent months, and her parents agree to this.      SOCIAL HISTORY  Social History     Tobacco Use    Smoking status: Never Smoker    Smokeless tobacco: Never Used   Substance Use Topics    Alcohol use: No    Drug use: No     Living arrangements - the patient lives with her family.  Employment: disabled; she's been denied for SSI (has never been able to work b/c of MS, so not eligible for SSDI)    MS ROS:  · Fatigue: Yes, but only minor fatigue  · Sleep Disturbance: No, sleeps 8-10 hours at night;   · Bladder Dysfunction: No --normal   · Bowel Dysfunction: NO  · Spasticity: No  · Visual Symptoms: No  · Cognitive: Yes - both pt and parents feel it is about the same;   · Mood Disorder: No.  · Gait Disturbance: Yes -father feels balance is a little better;    · Falls: No  · Hand Dysfunction: Yes, left hand tremor ongoing   · Pain: No.   · Sexual Dysfunction: Not Assessed  · Skin Breakdown: No  · Tremors: Yes, left hand tremor is same  · Dysphagia:  No.   · Dysarthria:  No  · Heat sensitivity:  No  · Any un-met adaptive needs? No.   · Copay Assist?  N/A      Objective:        1. 25 foot timed walk: 5.6 seconds without assist; was 6.86 seconds without assist last visit;    MENTAL STATUS:  There is moderate bradyphrenia; however, language is grossly    fluent with normal verbal comprehension, short-term and remote memory are    intact, attention is normal, patient is alert and oriented x3, fund of knowledge   appears grossly intact.  CRANIAL NERVE EXAM:  There is no internuclear ophthalmoplegia.  Extraocular    muscles  are intact.  Pupils are equal, round, and reactive to light.  No facial    asymmetry.  Facial sensation is intact bilaterally.  There is mild dysarthria.     Uvula is midline, and palate moves symmetrically.  Shoulder shrug intact    bilaterally.  Tongue protrusion is midline.  Hearing is grossly intact.  Neck is   supple.   MOTOR EXAM:  She has slightly slow rapid sequential movements in the hands    bilaterally.  Upper extremity strength is grossly full.  Right and left lower    extremity flexors have mild weakness in all groups, graded at about 5-/5 in all    groups.  SENSORY EXAM:  She has a mild decrease in vibration sense in the distal lower    extremities bilaterally.  REFLEXES:  Are 2+ and symmetric throughout.  She has a left Babinski.  Right toe   is down.  COORDINATION:  She has slight dystaxia on finger-to-nose bilaterally.  GAIT:  Slightly wide based.    Labs:     Lab Results   Component Value Date    OWCIXLFJ64BI 28 (L) 09/17/2018    VNEHOAER44TU 10 (L) 04/09/2018    BODDQOCH82QG 15 (L) 03/15/2017     Lab Results   Component Value Date    JR4FEUSC 72.6 03/15/2017    ABSOLUTECD3 1472 03/15/2017    KZ8DDTJD 18.1 03/15/2017    ABSOLUTECD8 367 03/15/2017    RU1NXKSB 52.4 03/15/2017    ABSOLUTECD4 1062 03/15/2017    LABCD48 2.89 03/15/2017     Lab Results   Component Value Date    WBC 4.91 11/05/2018    RBC 3.73 (L) 11/05/2018    HGB 10.2 (L) 11/05/2018    HCT 32.9 (L) 11/05/2018    MCV 88 11/05/2018    MCH 27.3 11/05/2018    MCHC 31.0 (L) 11/05/2018    RDW 14.9 (H) 11/05/2018     11/05/2018    MPV 11.6 11/05/2018    GRAN 3.1 11/05/2018    GRAN 64.0 11/05/2018    LYMPH 0.9 (L) 11/05/2018    LYMPH 17.9 (L) 11/05/2018    MONO 0.8 11/05/2018    MONO 16.1 (H) 11/05/2018    EOS 0.1 11/05/2018    BASO 0.02 11/05/2018    EOSINOPHIL 1.0 11/05/2018    BASOPHIL 0.4 11/05/2018     Sodium   Date Value Ref Range Status   09/17/2018 139 136 - 145 mmol/L Final     Potassium   Date Value Ref Range Status    09/17/2018 4.7 3.5 - 5.1 mmol/L Final     Chloride   Date Value Ref Range Status   09/17/2018 105 95 - 110 mmol/L Final     CO2   Date Value Ref Range Status   09/17/2018 25 23 - 29 mmol/L Final     Glucose   Date Value Ref Range Status   09/17/2018 82 70 - 110 mg/dL Final     BUN, Bld   Date Value Ref Range Status   09/17/2018 10 6 - 20 mg/dL Final     Creatinine   Date Value Ref Range Status   09/17/2018 0.6 0.5 - 1.4 mg/dL Final     Calcium   Date Value Ref Range Status   09/17/2018 9.9 8.7 - 10.5 mg/dL Final     Total Protein   Date Value Ref Range Status   09/17/2018 8.0 6.0 - 8.4 g/dL Final     Albumin   Date Value Ref Range Status   09/17/2018 4.0 3.5 - 5.2 g/dL Final     Total Bilirubin   Date Value Ref Range Status   09/17/2018 0.5 0.1 - 1.0 mg/dL Final     Comment:     For infants and newborns, interpretation of results should be based  on gestational age, weight and in agreement with clinical  observations.  Premature Infant recommended reference ranges:  Up to 24 hours.............<8.0 mg/dL  Up to 48 hours............<12.0 mg/dL  3-5 days..................<15.0 mg/dL  6-29 days.................<15.0 mg/dL       Alkaline Phosphatase   Date Value Ref Range Status   09/17/2018 90 55 - 135 U/L Final     AST   Date Value Ref Range Status   09/17/2018 12 10 - 40 U/L Final     ALT   Date Value Ref Range Status   09/17/2018 8 (L) 10 - 44 U/L Final     Anion Gap   Date Value Ref Range Status   09/17/2018 9 8 - 16 mmol/L Final     eGFR if    Date Value Ref Range Status   09/17/2018 >60.0 >60 mL/min/1.73 m^2 Final     eGFR if non    Date Value Ref Range Status   09/17/2018 >60.0 >60 mL/min/1.73 m^2 Final     Comment:     Calculation used to obtain the estimated glomerular filtration  rate (eGFR) is the CKD-EPI equation.        Diagnosis/Assessment/Plan:    1. Multiple Sclerosis  · Assessment: Bettye's timed walk is improved and CN abnormalities improved since starting Ocreuvs; Next  dose planned in April;   · Imaging: MRI brain in March--6 mo interval scan   · Disease Modifying Therapies: continue Ocrevus; The patient was counseled about the risks associated with immune suppressive therapy, including a higher risk of serious infections and malignancy, as well as the importance of avoiding all live virus vaccines while on immune suppressive medication.  Routine labs planned in March when she comes here for MRI. Advised pt and her family to increase vitamin D3 to 10,000 IU/day;     2. MS Symptom Assessment / Management  · Gait disturbance:  Home Health ordered for home PT and OT    F/u Anju Christopher CNS in 6 months;in D     Over 50% of this 40 minute visit was spent in direct face to face counseling of the patient about MS, DMT considerations, and MS symptom management.     Problem List Items Addressed This Visit        1 - High    MS (multiple sclerosis) - Primary    Relevant Orders    MRI Brain Demyelinating W W/O Contrast    CBC auto differential    Rituxan Sensitivity    Hepatitis B core antibody, total    Hepatitis B surface antibody    Hepatitis B surface antigen    Vitamin D    Ambulatory Referral to Physical/Occupational Therapy    Ambulatory Referral to Physical/Occupational Therapy       2     Cognitive dysfunction accompanying multiple sclerosis       3     Impaired mobility and ADLs    Relevant Orders    Ambulatory Referral to Physical/Occupational Therapy       4     Gait disturbance    Relevant Orders    Ambulatory Referral to Physical/Occupational Therapy       5     Encounter for long-term (current) use of high-risk medication       Unprioritized    Prophylactic immunotherapy      Other Visit Diagnoses     Vitamin D deficiency        Relevant Orders    MRI Brain Demyelinating W W/O Contrast    CBC auto differential    Rituxan Sensitivity    Hepatitis B core antibody, total    Hepatitis B surface antibody    Hepatitis B surface antigen    Vitamin D    Counseling regarding goals of  care

## 2019-01-22 ENCOUNTER — TELEPHONE (OUTPATIENT)
Dept: NEUROLOGY | Facility: CLINIC | Age: 39
End: 2019-01-22

## 2019-01-22 NOTE — TELEPHONE ENCOUNTER
----- Message from Ankush Campos sent at 1/22/2019 11:19 AM CST -----  Contact: Vicki (Mother) [phone]  Needs Advice    Reason for call: The patient decided to go with [facility] for physical therapy. Their phone number is [phone].         Communication Preference: PHONE    Additional Information:

## 2019-01-23 ENCOUNTER — DOCUMENTATION ONLY (OUTPATIENT)
Dept: NEUROLOGY | Facility: CLINIC | Age: 39
End: 2019-01-23

## 2019-01-27 PROBLEM — G35 COGNITIVE DYSFUNCTION ACCOMPANYING MULTIPLE SCLEROSIS: Status: ACTIVE | Noted: 2019-01-27

## 2019-01-27 PROBLEM — Z79.899 ENCOUNTER FOR LONG-TERM (CURRENT) USE OF HIGH-RISK MEDICATION: Status: ACTIVE | Noted: 2019-01-27

## 2019-01-27 PROBLEM — F09 COGNITIVE DYSFUNCTION ACCOMPANYING MULTIPLE SCLEROSIS: Status: ACTIVE | Noted: 2019-01-27

## 2019-01-27 PROBLEM — R26.9 GAIT DISTURBANCE: Status: ACTIVE | Noted: 2019-01-27

## 2019-01-27 PROBLEM — Z78.9 IMPAIRED MOBILITY AND ADLS: Status: ACTIVE | Noted: 2019-01-27

## 2019-01-27 PROBLEM — Z74.09 IMPAIRED MOBILITY AND ADLS: Status: ACTIVE | Noted: 2019-01-27

## 2019-01-27 PROBLEM — Z29.89 PROPHYLACTIC IMMUNOTHERAPY: Status: ACTIVE | Noted: 2019-01-27

## 2019-01-28 ENCOUNTER — TELEPHONE (OUTPATIENT)
Dept: PSYCHIATRY | Facility: CLINIC | Age: 39
End: 2019-01-28

## 2019-01-28 NOTE — TELEPHONE ENCOUNTER
Received referral from Dr. Gonzalez requesting SW reach to pt's mother about applying for SSI.  SW did discuss this issue with pt's mother, previously, but tried to call again today.  Left voicemail for her to call.

## 2019-02-21 ENCOUNTER — TELEPHONE (OUTPATIENT)
Dept: NEUROLOGY | Facility: CLINIC | Age: 39
End: 2019-02-21

## 2019-02-21 NOTE — TELEPHONE ENCOUNTER
----- Message from Marline Yanez sent at 2/21/2019  1:28 PM CST -----  Contact: Fabio( [facility]) @ [phone]  Calling to get the status of the request that was sent to Dr. Gonzalez for the patient to continue her OT. Please call.

## 2019-03-19 ENCOUNTER — DOCUMENTATION ONLY (OUTPATIENT)
Dept: NEUROLOGY | Facility: CLINIC | Age: 39
End: 2019-03-19

## 2019-03-20 ENCOUNTER — HOSPITAL ENCOUNTER (OUTPATIENT)
Dept: RADIOLOGY | Facility: HOSPITAL | Age: 39
Discharge: HOME | End: 2019-03-20
Attending: PSYCHIATRY & NEUROLOGY
Payer: MEDICAID

## 2019-03-20 DIAGNOSIS — E55.9 VITAMIN D DEFICIENCY: ICD-10-CM

## 2019-03-20 DIAGNOSIS — G35 MS (MULTIPLE SCLEROSIS): ICD-10-CM

## 2019-03-20 PROCEDURE — 70553 MRI BRAIN DEMYELINATING W/ WO CONTRAST: ICD-10-PCS | Mod: 26,,, | Performed by: RADIOLOGY

## 2019-03-20 PROCEDURE — 25500020 PHARM REV CODE 255: Performed by: PSYCHIATRY & NEUROLOGY

## 2019-03-20 PROCEDURE — 70553 MRI BRAIN STEM W/O & W/DYE: CPT | Mod: 26,,, | Performed by: RADIOLOGY

## 2019-03-20 PROCEDURE — A9585 GADOBUTROL INJECTION: HCPCS | Performed by: PSYCHIATRY & NEUROLOGY

## 2019-03-20 PROCEDURE — 70553 MRI BRAIN STEM W/O & W/DYE: CPT | Mod: TC

## 2019-03-20 RX ORDER — GADOBUTROL 604.72 MG/ML
8 INJECTION INTRAVENOUS
Status: COMPLETED | OUTPATIENT
Start: 2019-03-20 | End: 2019-03-20

## 2019-03-20 RX ADMIN — GADOBUTROL 8 ML: 604.72 INJECTION INTRAVENOUS at 07:03

## 2019-03-21 DIAGNOSIS — G35 MULTIPLE SCLEROSIS EXACERBATION: ICD-10-CM

## 2019-03-21 DIAGNOSIS — G35 MULTIPLE SCLEROSIS: Primary | ICD-10-CM

## 2019-03-28 ENCOUNTER — TELEPHONE (OUTPATIENT)
Dept: PSYCHIATRY | Facility: CLINIC | Age: 39
End: 2019-03-28

## 2019-03-28 NOTE — TELEPHONE ENCOUNTER
Phone call from pt's mother requesting assistance with pt's SSI application/appeal.  SW re-explained what we discussed in 2017 - that Bettye would likely be denied again if family was unwilling to allow for cognitive/psychological testing.  SW explained that this could be done at [facility], another provider's office, or through psychologist contracted with [facility].  SW explained why this information would likely be needed by SSA and encouraged pt's mother to reconsider.

## 2019-04-01 ENCOUNTER — TELEPHONE (OUTPATIENT)
Dept: NEUROLOGY | Facility: CLINIC | Age: 39
End: 2019-04-01

## 2019-04-01 NOTE — TELEPHONE ENCOUNTER
----- Message from Rachel Diallo sent at 4/1/2019  2:03 PM CDT -----  Contact: Pt:[phone]  .Needs Advice    Reason for call:Pt called and states she would like to speak with the nurse the pt did not specify the reason.         Communication Preference:Pt:[phone]    Additional Information:

## 2019-04-09 ENCOUNTER — DOCUMENTATION ONLY (OUTPATIENT)
Dept: NEUROLOGY | Facility: CLINIC | Age: 39
End: 2019-04-09

## 2019-04-12 ENCOUNTER — PATIENT MESSAGE (OUTPATIENT)
Dept: NEUROLOGY | Facility: CLINIC | Age: 39
End: 2019-04-12

## 2019-04-12 DIAGNOSIS — E23.6 ENLARGED PITUITARY GLAND: ICD-10-CM

## 2019-04-12 DIAGNOSIS — E23.6 ENLARGED PITUITARY GLAND: Primary | ICD-10-CM

## 2019-04-12 DIAGNOSIS — R90.89 ABNORMAL BRAIN MRI: Primary | ICD-10-CM

## 2019-04-12 NOTE — TELEPHONE ENCOUNTER
Spoke to patient's mother and made her aware of MRI results. She is concerned about pituitary abnormality. I advised that we will refer to Dr. Garcia.

## 2019-04-22 ENCOUNTER — TELEPHONE (OUTPATIENT)
Dept: NEUROLOGY | Facility: CLINIC | Age: 39
End: 2019-04-22

## 2019-04-22 ENCOUNTER — DOCUMENTATION ONLY (OUTPATIENT)
Dept: NEUROLOGY | Facility: CLINIC | Age: 39
End: 2019-04-22

## 2019-04-22 NOTE — TELEPHONE ENCOUNTER
----- Message from Rosalinda Linn RN sent at 4/22/2019  3:13 PM CDT -----  She has been scheduled for 5/14 - 11:45a.    Josie  ----- Message -----  From: Jordan Garcia MD  Sent: 4/22/2019   2:28 PM  To: LAMAR Peter, CNS, #    I will have a clinic on that date.  I will have her scheduled to see me in clinic.    Thanks.    MLW  ----- Message -----  From: LAMAR Peter, CNS  Sent: 4/15/2019  11:14 AM  To: Jordan Garcia MD    Hi, Dr. Garcia,  Dr. Gonzalez has suggested that Ms. Bettye Ch have a consult with you for pituitary enlargement seen on her recent brain MRI. She has an existing diagnosis of MS and will be seeing me on 5/14. She has Medicaid, so I know this may be a barrier to getting her seen. Do you happen to have any availability on 5/14 or any time around there? She comes from [place], so I'm trying to be mindful of their drive. My appt can be easily moved if necessary. Thank you so much for your help.     Anju

## 2019-04-23 ENCOUNTER — TELEPHONE (OUTPATIENT)
Dept: NEUROLOGY | Facility: CLINIC | Age: 39
End: 2019-04-23

## 2019-04-23 NOTE — TELEPHONE ENCOUNTER
[facility] scheduling of Dr. Garcia appointment on the same day she is scheduled to see the MS clinic.

## 2019-04-23 NOTE — TELEPHONE ENCOUNTER
----- Message from LAMAR Peter, CNS sent at 4/22/2019  5:14 PM CDT -----  Davi, can you let Bettye's mom know that Bettye is scheduled to see Dr. Garcia in neurosurgery for the pituitary enlargement on the same day she comes back to see us?  The appt is 5/14 at 11:45. Thanks.   ----- Message -----  From: Jordan Garcia MD  Sent: 4/22/2019   2:28 PM  To: LAMAR Peter CNS, #    I will have a clinic on that date.  I will have her scheduled to see me in clinic.    Thanks.    MLW  ----- Message -----  From: LAMAR Peter, CNS  Sent: 4/15/2019  11:14 AM  To: Jordan Garcia MD    Hi, Dr. Garcia,  Dr. Gonzalez has suggested that Ms. Bettye Ch have a consult with you for pituitary enlargement seen on her recent brain MRI. She has an existing diagnosis of MS and will be seeing me on 5/14. She has Medicaid, so I know this may be a barrier to getting her seen. Do you happen to have any availability on 5/14 or any time around there? She comes from [place], so I'm trying to be mindful of their drive. My appt can be easily moved if necessary. Thank you so much for your help.     Anju

## 2019-04-26 ENCOUNTER — TELEPHONE (OUTPATIENT)
Dept: NEUROSURGERY | Facility: CLINIC | Age: 39
End: 2019-04-26

## 2019-04-26 DIAGNOSIS — Z86.39 HISTORY OF ENLARGEMENT OF PITUITARY GLAND: Primary | ICD-10-CM

## 2019-05-08 ENCOUNTER — DOCUMENTATION ONLY (OUTPATIENT)
Dept: NEUROLOGY | Facility: CLINIC | Age: 39
End: 2019-05-08

## 2019-05-14 ENCOUNTER — OFFICE VISIT (OUTPATIENT)
Dept: NEUROLOGY | Facility: CLINIC | Age: 39
End: 2019-05-14
Payer: MEDICAID

## 2019-05-14 ENCOUNTER — TELEPHONE (OUTPATIENT)
Dept: NEUROSURGERY | Facility: CLINIC | Age: 39
End: 2019-05-14

## 2019-05-14 ENCOUNTER — OFFICE VISIT (OUTPATIENT)
Dept: NEUROSURGERY | Facility: CLINIC | Age: 39
End: 2019-05-14
Payer: MEDICAID

## 2019-05-14 ENCOUNTER — CLINICAL SUPPORT (OUTPATIENT)
Dept: INFECTIOUS DISEASES | Facility: CLINIC | Age: 39
End: 2019-05-14
Payer: MEDICAID

## 2019-05-14 VITALS
WEIGHT: 185 LBS | HEIGHT: 61 IN | DIASTOLIC BLOOD PRESSURE: 65 MMHG | BODY MASS INDEX: 34.93 KG/M2 | SYSTOLIC BLOOD PRESSURE: 106 MMHG | HEART RATE: 92 BPM

## 2019-05-14 VITALS
TEMPERATURE: 98 F | WEIGHT: 186 LBS | BODY MASS INDEX: 35.14 KG/M2 | DIASTOLIC BLOOD PRESSURE: 70 MMHG | SYSTOLIC BLOOD PRESSURE: 106 MMHG | HEART RATE: 75 BPM

## 2019-05-14 DIAGNOSIS — G35 MS (MULTIPLE SCLEROSIS): ICD-10-CM

## 2019-05-14 DIAGNOSIS — G35 MULTIPLE SCLEROSIS: Primary | ICD-10-CM

## 2019-05-14 DIAGNOSIS — Z86.39 HISTORY OF ENLARGEMENT OF PITUITARY GLAND: Primary | ICD-10-CM

## 2019-05-14 DIAGNOSIS — Z79.899 HIGH RISK MEDICATION USE: ICD-10-CM

## 2019-05-14 DIAGNOSIS — G35 COGNITIVE DYSFUNCTION ACCOMPANYING MULTIPLE SCLEROSIS: ICD-10-CM

## 2019-05-14 DIAGNOSIS — Z29.89 PROPHYLACTIC IMMUNOTHERAPY: ICD-10-CM

## 2019-05-14 DIAGNOSIS — Z71.89 COUNSELING REGARDING GOALS OF CARE: ICD-10-CM

## 2019-05-14 DIAGNOSIS — F09 COGNITIVE DYSFUNCTION ACCOMPANYING MULTIPLE SCLEROSIS: ICD-10-CM

## 2019-05-14 DIAGNOSIS — R26.9 GAIT DISTURBANCE: ICD-10-CM

## 2019-05-14 PROCEDURE — 99204 OFFICE O/P NEW MOD 45 MIN: CPT | Mod: S$PBB,,, | Performed by: NEUROLOGICAL SURGERY

## 2019-05-14 PROCEDURE — 99213 OFFICE O/P EST LOW 20 MIN: CPT | Mod: PBBFAC,25,27 | Performed by: NEUROLOGICAL SURGERY

## 2019-05-14 PROCEDURE — 99999 PR PBB SHADOW E&M-EST. PATIENT-LVL III: ICD-10-PCS | Mod: PBBFAC,,, | Performed by: NEUROLOGICAL SURGERY

## 2019-05-14 PROCEDURE — 90471 IMMUNIZATION ADMIN: CPT | Mod: PBBFAC

## 2019-05-14 PROCEDURE — 99999 PR PBB SHADOW E&M-EST. PATIENT-LVL III: ICD-10-PCS | Mod: PBBFAC,,, | Performed by: CLINICAL NURSE SPECIALIST

## 2019-05-14 PROCEDURE — 90750 HZV VACC RECOMBINANT IM: CPT | Mod: PBBFAC

## 2019-05-14 PROCEDURE — 99204 PR OFFICE/OUTPT VISIT, NEW, LEVL IV, 45-59 MIN: ICD-10-PCS | Mod: S$PBB,,, | Performed by: NEUROLOGICAL SURGERY

## 2019-05-14 PROCEDURE — 99213 OFFICE O/P EST LOW 20 MIN: CPT | Mod: PBBFAC,25 | Performed by: CLINICAL NURSE SPECIALIST

## 2019-05-14 PROCEDURE — 99999 PR PBB SHADOW E&M-EST. PATIENT-LVL III: CPT | Mod: PBBFAC,,, | Performed by: CLINICAL NURSE SPECIALIST

## 2019-05-14 PROCEDURE — 99214 PR OFFICE/OUTPT VISIT, EST, LEVL IV, 30-39 MIN: ICD-10-PCS | Mod: S$PBB,,, | Performed by: CLINICAL NURSE SPECIALIST

## 2019-05-14 PROCEDURE — 99214 OFFICE O/P EST MOD 30 MIN: CPT | Mod: S$PBB,,, | Performed by: CLINICAL NURSE SPECIALIST

## 2019-05-14 PROCEDURE — 99999 PR PBB SHADOW E&M-EST. PATIENT-LVL III: CPT | Mod: PBBFAC,,, | Performed by: NEUROLOGICAL SURGERY

## 2019-05-14 NOTE — PROGRESS NOTES
received Twinrix(third dose) and Shingrix(second dose) vaccine. Tolerated well. Left unit in NAD.

## 2019-05-14 NOTE — PROGRESS NOTES
Subjective:   I, Mamadou Vickers, attest that this documentation has been prepared under the direction and in the presence of Jordan Garcia MD.     Patient ID: Bettye Ch is a 38 y.o. female     Chief Complaint: Consult      HPI  Ms. Bettye Ch is a pleasant 38 y.o. woman with MS who was referred by Dr. Gonzalez in regarding to pituitary abnormality. Pt states she participates in physical therapy every day and helps around her house. She has also participated in occupational therapy for prior tremors but states she is doing well. She states she has not had any recent issues. She states she doesn't receive an eye exam regularly.       Review of Systems   Constitutional: Negative for activity change, fatigue, fever and unexpected weight change.   HENT: Negative for facial swelling.    Eyes: Negative.    Respiratory: Negative.    Cardiovascular: Negative.    Gastrointestinal: Negative for diarrhea, nausea and vomiting.   Genitourinary: Negative.    Musculoskeletal: Negative for back pain, joint swelling, myalgias and neck pain.   Neurological: Negative for dizziness, weakness, numbness and headaches.   Psychiatric/Behavioral: Negative.       Past Medical History:   Diagnosis Date    Multiple sclerosis        Objective:      Vitals:    05/14/19 1249   BP: 106/70   Pulse: 75   Temp: 97.9 °F (36.6 °C)      Physical Exam   Constitutional: She is oriented to person, place, and time. She appears well-developed and well-nourished.   HENT:   Head: Normocephalic and atraumatic.   Neck: Neck supple.   Neurological: She is alert and oriented to person, place, and time. No cranial nerve deficit. She displays a negative Romberg sign. GCS eye subscore is 4. GCS verbal subscore is 5. GCS motor subscore is 6.          IMAGING:  MRI Brain Demyelinating W W/O Contrast (03/20/2019) shows some enlargement of the pituitary gland with an area of hypointensity in the inferior portion of the gland. There is no compression of  the optic chiasm.      I have personally reviewed the images with the pt.      I, Dr. Jordan Garcia, personally performed the services described in this documentation. All medical record entries made by the scribe, Mamadou Vickers, were at my direction and in my presence.  I have reviewed the chart and agree that the record reflects my personal performance and is accurate and complete. Jordan Garcia MD. 05/14/2019    Assessment:       Pituitary mass   Plan:   I have personally reviewed the MRI brain with the pt which shows some enlargement of the pituitary gland with an area of hypointensity in the inferior portion of the gland. There is no compression of the optic chiasm.    I will refer the pt to Dr. Su, Ophthalmologist, for gibson visual field.     I will order a pituitary panel and contact the pt with the results. If all is normal, we will schedule the pt for 1 year follow up with MRI brain.

## 2019-05-14 NOTE — PATIENT INSTRUCTIONS
I have personally reviewed the MRI brain with the pt which shows some enlargement of the pituitary gland with an area of hypointensity in the inferior portion of the gland. There is no compression of the optic chiasm.    I will refer the pt to Dr. Su, Ophthalmologist, for gibson visual field.     I will order a pituitary panel and contact the pt with the results. If all is normal, we will schedule the pt for 1 year follow up with MRI brain.

## 2019-05-14 NOTE — PROGRESS NOTES
Subjective:       Patient ID: Bettye Ch is a 38 y.o. female who presents today for a routine clinic visit for MS.  The history has been provided by the patient. She was last seen in January 2019.     MS HPI:  · DMT: Ocrevus last given on April 24, 2019; due next in October 2019  · Side effects from DMT? No  · Taking vitamin D3 as recommended? Yes -  Dose: 10,000 units daily   · She is feeling stable for the most part.   · She has done PT/OT since the last visit.     SOCIAL HISTORY  Social History     Tobacco Use    Smoking status: Never Smoker    Smokeless tobacco: Never Used   Substance Use Topics    Alcohol use: No    Drug use: No     Living arrangements - the patient lives with her family.  Employment: disabled     MS ROS:  · Fatigue: Energy level has been good.   · Sleep Disturbance: No, sleeps 8-10 hours at night;   · Bladder Dysfunction: No --normal; denies any UTIs   · Bowel Dysfunction: Has BM about 3 times a week   · Spasticity: No  · Visual Symptoms: No  · Cognitive: Yes - She denies any trouble remembering anything, but could not recall dose of Vitamin D and how many hours of sleep she gets per night. She also thought that the infusion nurse had a hard time getting her vein during her last infusion, but her mother said that the venous access went very well last time.   · Mood Disorder: No  · Gait Disturbance: Yes -Balance is good if she is walking on a level surface   · Falls: Rolled out of bed yesterday after sleeping on edge of bed   · Hand Dysfunction: Yes, left hand tremor ongoing   · Pain: No.   · Sexual Dysfunction: Not Assessed  · Skin Breakdown: No  · Tremors: Yes, left hand tremor is same  · Dysphagia:  No.   · Dysarthria:  No  · Heat sensitivity:  No  · Any un-met adaptive needs? No.   · Copay Assist: $0 copay for Ocrevus       Objective:        1. 25 foot timed walk: 5.7 seconds today without assist;  was 5.6 seconds at last visit without assist     Neurologic Exam      Mental Status   Oriented to person, place, and time.   Attention: normal. Concentration: normal.   Speech: speech is normal   Level of consciousness: alert  Knowledge: good.   Normal comprehension.     Cranial Nerves     CN II   Visual acuity: normal (20/25 OU )    CN III, IV, VI   Pupils are equal, round, and reactive to light.  Right pupil: Shape: regular.   Left pupil: Shape: regular.   Nystagmus: none     CN V   Right facial sensation deficit: none  Left facial sensation deficit: none    CN VII   Right facial weakness: none  Left facial weakness: none    CN VIII   Hearing: intact    CN IX, X   Palate: symmetric    CN XI   Right sternocleidomastoid strength: normal  Left sternocleidomastoid strength: normal  Right trapezius strength: normal  Left trapezius strength: normal    CN XII   Tongue deviation: none    Motor Exam     Strength   Right neck flexion: 5/5  Left neck flexion: 5/5  Right neck extension: 5/5  Left neck extension: 5/5  Right deltoid: 5/5  Left deltoid: 5/5  Right biceps: 5/5  Left biceps: 5/5  Right triceps: 5/5  Left triceps: 5/5  Right wrist flexion: 5/5  Left wrist flexion: 5/5  Right wrist extension: 5/5  Left wrist extension: 5/5  Right interossei: 5/5  Left interossei: 5/5  Right iliopsoas: 5/5  Left iliopsoas: 5/5  Right quadriceps: 5/5  Left quadriceps: 5/5  Right hamstrin/5  Left hamstrin/5  Right anterior tibial: 5/5  Left anterior tibial: 5/5  Right gastroc: 5/5  Left gastroc: 5/5         Sensory Exam   Right arm vibration: normal  Left arm vibration: normal  Right leg vibration: decreased from knee  Left leg vibration: decreased from knee  Mild to moderate decrease in vibratory sense to BLE.          Gait, Coordination, and Reflexes     Gait  Gait: shuffling and wide-based    Coordination   Romberg: negative  Finger to nose coordination: abnormal (slow bilaterally with moderate dysmetria )  Heel to shin coordination: abnormal  Tandem walking coordination: abnormal  (unsteady; needs assistance )    Reflexes   Right brachioradialis: 2+  Left brachioradialis: 2+  Right biceps: 2+  Left biceps: 2+  Right triceps: 2+  Left triceps: 2+  Right patellar: 2+  Left patellar: 2+  Right achilles: 2+  Left achilles: 2+  Slightly slow rapid sequential movements in the hands and feet bilaterally (more impaired with left hand)               Imaging:     EXAMINATION:  MRI BRAIN DEMYELINATING W/ WO CONTRAST    CLINICAL HISTORY:  multiple sclerosis; Multiple sclerosis    TECHNIQUE:  Multiplanar multisequence MR imaging of the brain was performed before and after the administration of 8 mL Gadavist intravenous contrast.    COMPARISON:  09/17/2018, 03/15/2017    FINDINGS:  Ventricles are stable in size.  Mild generalized cerebral volume loss.    Extensive patchy and confluent T2/FLAIR hyperintensity throughout the supratentorial and infratentorial white matter in a distribution consistent with the known history of multiple sclerosis.  These lesions appear stable from the prior examination with no new discrete lesions identified.  No enhancing foci.    No new brain parenchymal mass, hemorrhage or infarction.    The pituitary gland is enlarged for age with a somewhat lobulated configuration.  Measures up to 1.0 cm in craniocaudad dimension (sequence 15 image 85).  Slight suprasellar extension but does not over exhibit mass effect on the optic chiasm.    No extra-axial blood or fluid collections.    The T2 skull base flow voids are preserved.  Bone marrow signal intensity unremarkable.  Patchy mucoperiosteal thickening in the paranasal sinuses.      Impression       Stable appearance of the brain, again exhibiting findings compatible with reported history of multiple sclerosis.  No new or enhancing foci to indicate ongoing or active demyelination    The pituitary gland is enlarged for age with slightly lobulated appearance.  An underlying lesion such as an adenoma not excluded.  Correlation with  laboratory values and dedicated pituitary imaging if warranted.    This report was flagged in Epic as abnormal.      Electronically signed by: Miguel A Sims MD  Date: 03/20/2019  Time: 08:45     Images reviewed with the patient.       Labs:     Lab Results   Component Value Date    [id number] 60 03/20/2019    [id number] 28 (L) 09/17/2018    REUPHQPB48CJ 10 (L) 04/09/2018     Lab Results   Component Value Date    JCVINDEX SEE COMMENT (A) 03/24/2015    JCVANTIBODY SEE COMMENT 03/24/2015     Lab Results   Component Value Date    WBC 3.96 03/20/2019    RBC 3.96 (L) 03/20/2019    HGB 10.9 (L) 03/20/2019    HCT 35.4 (L) 03/20/2019    MCV 89 03/20/2019    MCH 27.5 03/20/2019    MCHC 30.8 (L) 03/20/2019    RDW 14.1 03/20/2019     03/20/2019    MPV 10.3 03/20/2019    GRAN 2.2 03/20/2019    GRAN 54.5 03/20/2019    LYMPH 1.1 03/20/2019    LYMPH 27.8 03/20/2019    MONO 0.4 03/20/2019    MONO 10.4 03/20/2019    EOS 0.3 03/20/2019    BASO 0.02 03/20/2019    EOSINOPHIL 6.8 03/20/2019    BASOPHIL 0.5 03/20/2019     Sodium   Date Value Ref Range Status   09/17/2018 139 136 - 145 mmol/L Final     Potassium   Date Value Ref Range Status   09/17/2018 4.7 3.5 - 5.1 mmol/L Final     Chloride   Date Value Ref Range Status   09/17/2018 105 95 - 110 mmol/L Final     CO2   Date Value Ref Range Status   09/17/2018 25 23 - 29 mmol/L Final     Glucose   Date Value Ref Range Status   09/17/2018 82 70 - 110 mg/dL Final     BUN, Bld   Date Value Ref Range Status   09/17/2018 10 6 - 20 mg/dL Final     Creatinine   Date Value Ref Range Status   09/17/2018 0.6 0.5 - 1.4 mg/dL Final     Calcium   Date Value Ref Range Status   09/17/2018 9.9 8.7 - 10.5 mg/dL Final     Total Protein   Date Value Ref Range Status   09/17/2018 8.0 6.0 - 8.4 g/dL Final     Albumin   Date Value Ref Range Status   09/17/2018 4.0 3.5 - 5.2 g/dL Final     Total Bilirubin   Date Value Ref Range Status   09/17/2018 0.5 0.1 - 1.0 mg/dL Final     Comment:     For  infants and newborns, interpretation of results should be based  on gestational age, weight and in agreement with clinical  observations.  Premature Infant recommended reference ranges:  Up to 24 hours.............<8.0 mg/dL  Up to 48 hours............<12.0 mg/dL  3-5 days..................<15.0 mg/dL  6-29 days.................<15.0 mg/dL       Alkaline Phosphatase   Date Value Ref Range Status   09/17/2018 90 55 - 135 U/L Final     AST   Date Value Ref Range Status   09/17/2018 12 10 - 40 U/L Final     ALT   Date Value Ref Range Status   09/17/2018 8 (L) 10 - 44 U/L Final     Anion Gap   Date Value Ref Range Status   09/17/2018 9 8 - 16 mmol/L Final     eGFR if    Date Value Ref Range Status   09/17/2018 >60.0 >60 mL/min/1.73 m^2 Final     eGFR if non    Date Value Ref Range Status   09/17/2018 >60.0 >60 mL/min/1.73 m^2 Final     Comment:     Calculation used to obtain the estimated glomerular filtration  rate (eGFR) is the CKD-EPI equation.        Lab Results   Component Value Date    COLORU Yellow 09/17/2018    APPEARANCEUA Hazy (A) 09/17/2018    PHUR 7.0 09/17/2018    SPECGRAV 1.020 09/17/2018    PROTEINUA Negative 09/17/2018    GLUCUA Negative 09/17/2018    KETONESU Negative 09/17/2018    BILIRUBINUA Negative 09/17/2018    OCCULTUA Negative 09/17/2018    NITRITE Negative 09/17/2018    UROBILINOGEN 2.0 09/17/2018    LEUKOCYTESUR 1+ (A) 09/17/2018       Diagnosis/Assessment/Plan:    1. Multiple Sclerosis  · Assessment: Bettye is stable from an MS perspective. Her exam is stable, and she has no new or active lesions on MRI. Her brain MRI suggests pituitary enlargement, so she will see Dr. Garcia in neurosurgery today for evaluation.   · Imaging: MRI in March 2020   · Disease Modifying Therapies: Continue Ocrevus and Vitamin D. Will check CBC, Cd20, hepatitis B labs, and Vitamin D in September. She is aware of the risks associated with immune therapy, including increased risk of  infection. She is completing hepatitis B vaccine series today.     2. MS Symptom Assessment / Management   --No change to symptom management plan today.     Over 50% of this 30 minute visit was spent in direct face to face counseling of the patient about MS, DMT considerations, and MS symptom management. The patient agrees with the plan of care. I will see her back in September.     Anju Christopher, [facility], MSCN        There are no diagnoses linked to this encounter.

## 2019-05-16 ENCOUNTER — TELEPHONE (OUTPATIENT)
Dept: OPHTHALMOLOGY | Facility: CLINIC | Age: 39
End: 2019-05-16

## 2019-05-16 NOTE — TELEPHONE ENCOUNTER
Call to schedule appt w/(VF) per . Pt states she will give our office a call when she gets her transportation in order. fritz

## 2019-05-28 ENCOUNTER — TELEPHONE (OUTPATIENT)
Dept: NEUROSURGERY | Facility: CLINIC | Age: 39
End: 2019-05-28

## 2019-09-12 ENCOUNTER — TELEPHONE (OUTPATIENT)
Dept: NEUROLOGY | Facility: CLINIC | Age: 39
End: 2019-09-12

## 2019-09-12 NOTE — TELEPHONE ENCOUNTER
----- Message from Casi Vivas sent at 9/12/2019  9:39 AM CDT -----  Contact: Sriram Cohen would like a call back in regards to her appt .    Vicki would like a call back at [phone].    Thank you.

## 2019-09-18 NOTE — PROGRESS NOTES
"Subjective:       Patient ID: Bettye Ch is a 39 y.o. female who presents today for a routine clinic visit for MS. She was last seen in May 2019. The history has been provided by the patient.       MS HPI:  · DMT: Ocrevus; due in October 2019  · Side effects from DMT? No  · Taking vitamin D3 as recommended? Not taking it now  · She walks around the house and completes her daily activities for exercise.   · She denies any new or different symptoms.   · She takes iron pills for anemia.   · She saw Dr. Garcia for enlargement of the pituitary gland.   · She is considering going back to school.     SOCIAL HISTORY  Social History     Tobacco Use    Smoking status: Never Smoker    Smokeless tobacco: Never Used   Substance Use Topics    Alcohol use: No    Drug use: No     Living arrangements - the patient lives with their family.  Employment: disabled     MS ROS:  · Fatigue: Energy level has been good; enough to complete her daily activities  · Sleep Disturbance: No, sleeps 8-10 hours at night;   · Bladder Dysfunction: No --normal; denies any UTIs   · Bowel Dysfunction: Has BM about 3 times a week; based on diet  · Spasticity: No  · Visual Symptoms: No  · Cognitive: Yes -She states "There are not many things that I need to remember." If she needs to remember something, she remembers.   · Mood Disorder: No  · Gait Disturbance: Yes -Stable  · Falls: No.   · Hand Dysfunction: Mild left hand tremor   · Pain: No.   · Sexual Dysfunction: Not Assessed  · Skin Breakdown: No  · Tremors: Yes, left hand tremor is same  · Dysphagia:  No  · Dysarthria:  No  · Heat sensitivity:  No; tries to avoid the extreme heat   · Any un-met adaptive needs? No   · Copay Assist: $0 copay for Ocrevus     Objective:        1. 25 foot timed walk: 5.8 seconds today without assist; was 5.7 seconds at last visit without assist     Neurologic Exam     Mental Status   Oriented to person, place, and time.   Attention: normal. Concentration: " normal.   Speech: speech is normal   Level of consciousness: alert  Knowledge: good.   Normal comprehension.     Cranial Nerves     CN II   Visual acuity: (20/25 OU )    CN III, IV, VI   Pupils are equal, round, and reactive to light.  Right pupil: Shape: regular.   Left pupil: Shape: regular.   Nystagmus: none     CN V   Right facial sensation deficit: none  Left facial sensation deficit: none    CN VII   Right facial weakness: none  Left facial weakness: none    CN VIII   Hearing: intact    CN IX, X   Palate: symmetric    CN XI   Right sternocleidomastoid strength: normal  Left sternocleidomastoid strength: normal  Right trapezius strength: normal  Left trapezius strength: normal    CN XII   Tongue deviation: none    Motor Exam     Strength   Right neck flexion: 5/5  Left neck flexion: 5/5  Right neck extension: 5/5  Left neck extension: 5/5  Right deltoid: 5/5  Left deltoid: 5/5  Right biceps: 5/5  Left biceps: 5/5  Right triceps: 5/5  Left triceps: 5/5  Right wrist flexion: 5/5  Left wrist flexion: 5/5  Right wrist extension: 5/5  Left wrist extension: 5/5  Right interossei: 5/5  Left interossei: 5/5  Right iliopsoas: 5/5  Left iliopsoas: 5/5  Right quadriceps: 5/5  Left quadriceps: 5/5  Right hamstrin/5  Left hamstrin/5  Right anterior tibial: 5/5  Left anterior tibial: 5/5  Right gastroc: 5/5  Left gastroc: 5/5         Sensory Exam   Right arm vibration: normal  Left arm vibration: normal  Right leg vibration: decreased from knee  Left leg vibration: decreased from knee    Mild to moderate decrease in vibratory sense to BLE.          Gait, Coordination, and Reflexes     Gait  Gait: wide-based    Coordination   Romberg: negative  Finger to nose coordination: abnormal (slow bilaterally with moderate dysmetria )  Heel to shin coordination: abnormal  Tandem walking coordination: abnormal (unsteady; needs assistance )    Reflexes   Right brachioradialis: 2+  Left brachioradialis: 2+  Right biceps: 2+  Left  biceps: 2+  Right triceps: 2+  Left triceps: 2+  Right patellar: 2+  Left patellar: 2+  Right achilles: 2+  Left achilles: 2+            Imaging:     No new imaging to review today.   Results for orders placed during the hospital encounter of 03/20/19   MRI Brain Demyelinating W W/O Contrast    Impression Stable appearance of the brain, again exhibiting findings compatible with reported history of multiple sclerosis.  No new or enhancing foci to indicate ongoing or active demyelination    The pituitary gland is enlarged for age with slightly lobulated appearance.  An underlying lesion such as an adenoma not excluded.  Correlation with laboratory values and dedicated pituitary imaging if warranted.    This report was flagged in Epic as abnormal.      Electronically signed by: Miguel A Sims MD  Date:    03/20/2019  Time:    08:45       Labs:     Lab Results   Component Value Date    [id number] 60 03/20/2019    YHJKYRHK22SO 28 (L) 09/17/2018    GBJAWWBJ13OV 10 (L) 04/09/2018     Lab Results   Component Value Date    WBC 3.96 03/20/2019    RBC 3.96 (L) 03/20/2019    HGB 10.9 (L) 03/20/2019    HCT 35.4 (L) 03/20/2019    MCV 89 03/20/2019    MCH 27.5 03/20/2019    MCHC 30.8 (L) 03/20/2019    RDW 14.1 03/20/2019     03/20/2019    MPV 10.3 03/20/2019    GRAN 2.2 03/20/2019    GRAN 54.5 03/20/2019    LYMPH 1.1 03/20/2019    LYMPH 27.8 03/20/2019    MONO 0.4 03/20/2019    MONO 10.4 03/20/2019    EOS 0.3 03/20/2019    BASO 0.02 03/20/2019    EOSINOPHIL 6.8 03/20/2019    BASOPHIL 0.5 03/20/2019     Sodium   Date Value Ref Range Status   09/17/2018 139 136 - 145 mmol/L Final     Potassium   Date Value Ref Range Status   09/17/2018 4.7 3.5 - 5.1 mmol/L Final     Chloride   Date Value Ref Range Status   09/17/2018 105 95 - 110 mmol/L Final     CO2   Date Value Ref Range Status   09/17/2018 25 23 - 29 mmol/L Final     Glucose   Date Value Ref Range Status   09/17/2018 82 70 - 110 mg/dL Final     BUN, Bld   Date Value Ref  Range Status   09/17/2018 10 6 - 20 mg/dL Final     Creatinine   Date Value Ref Range Status   09/17/2018 0.6 0.5 - 1.4 mg/dL Final     Calcium   Date Value Ref Range Status   09/17/2018 9.9 8.7 - 10.5 mg/dL Final     Total Protein   Date Value Ref Range Status   09/17/2018 8.0 6.0 - 8.4 g/dL Final     Albumin   Date Value Ref Range Status   09/17/2018 4.0 3.5 - 5.2 g/dL Final     Total Bilirubin   Date Value Ref Range Status   09/17/2018 0.5 0.1 - 1.0 mg/dL Final     Comment:     For infants and newborns, interpretation of results should be based  on gestational age, weight and in agreement with clinical  observations.  Premature Infant recommended reference ranges:  Up to 24 hours.............<8.0 mg/dL  Up to 48 hours............<12.0 mg/dL  3-5 days..................<15.0 mg/dL  6-29 days.................<15.0 mg/dL       Alkaline Phosphatase   Date Value Ref Range Status   09/17/2018 90 55 - 135 U/L Final     AST   Date Value Ref Range Status   09/17/2018 12 10 - 40 U/L Final     ALT   Date Value Ref Range Status   09/17/2018 8 (L) 10 - 44 U/L Final     Anion Gap   Date Value Ref Range Status   09/17/2018 9 8 - 16 mmol/L Final     eGFR if    Date Value Ref Range Status   09/17/2018 >60.0 >60 mL/min/1.73 m^2 Final     eGFR if non    Date Value Ref Range Status   09/17/2018 >60.0 >60 mL/min/1.73 m^2 Final     Comment:     Calculation used to obtain the estimated glomerular filtration  rate (eGFR) is the CKD-EPI equation.          Diagnosis/Assessment/Plan:    1. Multiple Sclerosis  · Assessment: Bettye is clinically stable on Ocrevus.   · Imaging: MRI in March 2020  · Disease Modifying Therapies:Continue Ocrevus and Vitamin D. Will check CBC, CD20, hepatitis B, and Vitamin D today. She is aware of the risks associated with immunosuppressant therapy, including increased risk of infection.     2. MS Symptom Assessment / Management  · No change to symptom management plan today            3. Pituitary enlargement--will repeat MRI as per Dr. Garcia's recommendation in the spring with annual f/u                with him. Will also help to set up her appt with Dr. Su.     Our visit today lasted 40 minutes, and 100% of this time was spent face to face with the patient. Over 50% of this visit included discussion of the treatment plan/medication changes/symptom management/exam findings/imaging results/coordination of care. The patient agrees with the plan of care. We will try to coordinate her MRI, Dr. Su appt (as requested by Dr. Garcia), neurosurgery follow-up, and MS follow-up on the same day if possible.     Problem List Items Addressed This Visit     None      Visit Diagnoses     Multiple sclerosis    -  Primary    Relevant Orders    MRI Brain Demyelinating W W/O Contrast    History of enlargement of pituitary gland        Relevant Orders    MRI Brain Demyelinating W W/O Contrast            MORGAN Beasley-BC, MSCN

## 2019-09-19 ENCOUNTER — OFFICE VISIT (OUTPATIENT)
Dept: NEUROLOGY | Facility: CLINIC | Age: 39
End: 2019-09-19
Payer: MEDICAID

## 2019-09-19 ENCOUNTER — LAB VISIT (OUTPATIENT)
Dept: LAB | Facility: HOSPITAL | Age: 39
End: 2019-09-19
Payer: MEDICAID

## 2019-09-19 VITALS
HEIGHT: 61 IN | BODY MASS INDEX: 34.96 KG/M2 | HEART RATE: 84 BPM | WEIGHT: 185.19 LBS | SYSTOLIC BLOOD PRESSURE: 101 MMHG | DIASTOLIC BLOOD PRESSURE: 67 MMHG

## 2019-09-19 DIAGNOSIS — Z79.899 HIGH RISK MEDICATION USE: ICD-10-CM

## 2019-09-19 DIAGNOSIS — G35 MS (MULTIPLE SCLEROSIS): ICD-10-CM

## 2019-09-19 DIAGNOSIS — Z29.89 PROPHYLACTIC IMMUNOTHERAPY: ICD-10-CM

## 2019-09-19 DIAGNOSIS — Z71.89 COUNSELING REGARDING GOALS OF CARE: ICD-10-CM

## 2019-09-19 DIAGNOSIS — G35 MULTIPLE SCLEROSIS: Primary | ICD-10-CM

## 2019-09-19 DIAGNOSIS — Z86.39 HISTORY OF ENLARGEMENT OF PITUITARY GLAND: ICD-10-CM

## 2019-09-19 DIAGNOSIS — G35 MULTIPLE SCLEROSIS: ICD-10-CM

## 2019-09-19 LAB
25(OH)D3+25(OH)D2 SERPL-MCNC: 27 NG/ML (ref 30–96)
BASOPHILS # BLD AUTO: 0.04 K/UL (ref 0–0.2)
BASOPHILS NFR BLD: 0.8 % (ref 0–1.9)
DIFFERENTIAL METHOD: ABNORMAL
EOSINOPHIL # BLD AUTO: 0.2 K/UL (ref 0–0.5)
EOSINOPHIL NFR BLD: 3.6 % (ref 0–8)
ERYTHROCYTE [DISTWIDTH] IN BLOOD BY AUTOMATED COUNT: 13.7 % (ref 11.5–14.5)
HBV CORE AB SERPL QL IA: NEGATIVE
HBV SURFACE AB SER-ACNC: POSITIVE M[IU]/ML
HBV SURFACE AG SERPL QL IA: NEGATIVE
HCT VFR BLD AUTO: 34 % (ref 37–48.5)
HGB BLD-MCNC: 10.6 G/DL (ref 12–16)
IMM GRANULOCYTES # BLD AUTO: 0.01 K/UL (ref 0–0.04)
IMM GRANULOCYTES NFR BLD AUTO: 0.2 % (ref 0–0.5)
LYMPHOCYTES # BLD AUTO: 1.1 K/UL (ref 1–4.8)
LYMPHOCYTES NFR BLD: 21.1 % (ref 18–48)
MCH RBC QN AUTO: 28.7 PG (ref 27–31)
MCHC RBC AUTO-ENTMCNC: 31.2 G/DL (ref 32–36)
MCV RBC AUTO: 92 FL (ref 82–98)
MONOCYTES # BLD AUTO: 0.7 K/UL (ref 0.3–1)
MONOCYTES NFR BLD: 12.5 % (ref 4–15)
NEUTROPHILS # BLD AUTO: 3.3 K/UL (ref 1.8–7.7)
NEUTROPHILS NFR BLD: 61.8 % (ref 38–73)
NRBC BLD-RTO: 0 /100 WBC
PLATELET # BLD AUTO: 282 K/UL (ref 150–350)
PMV BLD AUTO: 10.6 FL (ref 9.2–12.9)
RBC # BLD AUTO: 3.69 M/UL (ref 4–5.4)
WBC # BLD AUTO: 5.27 K/UL (ref 3.9–12.7)

## 2019-09-19 PROCEDURE — 82306 VITAMIN D 25 HYDROXY: CPT

## 2019-09-19 PROCEDURE — 99213 OFFICE O/P EST LOW 20 MIN: CPT | Mod: PBBFAC | Performed by: CLINICAL NURSE SPECIALIST

## 2019-09-19 PROCEDURE — 36415 COLL VENOUS BLD VENIPUNCTURE: CPT

## 2019-09-19 PROCEDURE — 99999 PR PBB SHADOW E&M-EST. PATIENT-LVL III: ICD-10-PCS | Mod: PBBFAC,,, | Performed by: CLINICAL NURSE SPECIALIST

## 2019-09-19 PROCEDURE — 87340 HEPATITIS B SURFACE AG IA: CPT

## 2019-09-19 PROCEDURE — 86356 MONONUCLEAR CELL ANTIGEN: CPT

## 2019-09-19 PROCEDURE — 86706 HEP B SURFACE ANTIBODY: CPT

## 2019-09-19 PROCEDURE — 85025 COMPLETE CBC W/AUTO DIFF WBC: CPT

## 2019-09-19 PROCEDURE — 86704 HEP B CORE ANTIBODY TOTAL: CPT

## 2019-09-19 PROCEDURE — 99215 PR OFFICE/OUTPT VISIT, EST, LEVL V, 40-54 MIN: ICD-10-PCS | Mod: S$PBB,,, | Performed by: CLINICAL NURSE SPECIALIST

## 2019-09-19 PROCEDURE — 99999 PR PBB SHADOW E&M-EST. PATIENT-LVL III: CPT | Mod: PBBFAC,,, | Performed by: CLINICAL NURSE SPECIALIST

## 2019-09-19 PROCEDURE — 99215 OFFICE O/P EST HI 40 MIN: CPT | Mod: S$PBB,,, | Performed by: CLINICAL NURSE SPECIALIST

## 2019-09-19 NOTE — Clinical Note
Hi, Bettye Rodriguez is due for her annual MRI for MS in March. Will this work for the pituitary evaluation, or do I need to request a specific protocol? I'm happy to enter the order, but I just want to make sure it is correct. She did not see Dr. Su as you recommended, so I am going to try and coordinate MRI, Dr. Su, our appt, and yours on the same day if possible. Thank you!Anju

## 2019-09-19 NOTE — Clinical Note
KD, can we try to coordinate her brain MRI, Dr. Su appt (referred by Dr. Garcia), Dr. Garcia f/u, and BERTIN appt on the same day in March? I know this might be too much. If so, let's do Georges and BERTIN in January and SAW and Radha on the same day in March (with MRI being first).

## 2019-09-23 DIAGNOSIS — G35 MULTIPLE SCLEROSIS: ICD-10-CM

## 2019-09-23 LAB — CD20 CELLS NFR SPEC: NORMAL %

## 2019-09-24 NOTE — TELEPHONE ENCOUNTER
Hep B Surface Ab positive (past vaccination)  Hep B Surface Ag negative  Hep B Core Ab negative   WBC=5.27  YUG=6469  CD19, 20=0    Ok to proceed with infusion.

## 2019-10-11 ENCOUNTER — TELEPHONE (OUTPATIENT)
Dept: NEUROLOGY | Facility: CLINIC | Age: 39
End: 2019-10-11

## 2019-10-11 DIAGNOSIS — E23.6 ENLARGED PITUITARY GLAND: ICD-10-CM

## 2019-10-11 DIAGNOSIS — G35 MULTIPLE SCLEROSIS: Primary | ICD-10-CM

## 2019-10-11 NOTE — TELEPHONE ENCOUNTER
----- Message from LAMAR Peter, CNS sent at 9/19/2019  5:56 PM CDT -----  Hi, Dr. Garcia Bettye is due for her annual MRI for MS in March. Will this work for the pituitary evaluation, or do I need to request a specific protocol? I'm happy to enter the order, but I just want to make sure it is correct. She did not see Dr. Su as you recommended, so I am going to try and coordinate MRI, Dr. Su, our appt, and yours on the same day if possible. Thank you!    Anju

## 2019-10-14 ENCOUNTER — TELEPHONE (OUTPATIENT)
Dept: NEUROSURGERY | Facility: CLINIC | Age: 39
End: 2019-10-14

## 2019-10-14 ENCOUNTER — TELEPHONE (OUTPATIENT)
Dept: NEUROLOGY | Facility: CLINIC | Age: 39
End: 2019-10-14

## 2019-10-14 DIAGNOSIS — Z86.39 HISTORY OF ENLARGEMENT OF PITUITARY GLAND: Primary | ICD-10-CM

## 2019-10-14 NOTE — TELEPHONE ENCOUNTER
Called pt. States she does not have lab orders for future labs. Will mail to patient. Instructed to have drawn the week before appt at [facility].

## 2019-10-16 ENCOUNTER — TELEPHONE (OUTPATIENT)
Dept: NEUROLOGY | Facility: CLINIC | Age: 39
End: 2019-10-16

## 2019-10-16 NOTE — TELEPHONE ENCOUNTER
----- Message from Kelby Linn sent at 10/16/2019 10:50 AM CDT -----  Contact: Vicki (rayo ) @ [phone]  Caller calling to get an update on the order/appt for treatment in [place], mery advise

## 2019-11-05 DIAGNOSIS — G35 MULTIPLE SCLEROSIS: Primary | ICD-10-CM

## 2019-11-05 DIAGNOSIS — Z86.39 HISTORY OF ENLARGEMENT OF PITUITARY GLAND: ICD-10-CM

## 2019-12-03 NOTE — TELEPHONE ENCOUNTER
Spoke with pt's mother to inform her of all appts coordinated on March 24, as follows:    630AM - MRI  945AM - HVF  10AM - Georges, Ophthalmology  11AM - Radha, Neurosurgery  1PM - Carlos, Neurology, MS    Pt's mother verbalized understanding.  Appt reminders mailed out.

## 2020-03-04 ENCOUNTER — TELEPHONE (OUTPATIENT)
Dept: NEUROLOGY | Facility: CLINIC | Age: 40
End: 2020-03-04

## 2020-03-04 DIAGNOSIS — G35 MULTIPLE SCLEROSIS: Primary | ICD-10-CM

## 2020-03-23 ENCOUNTER — TELEPHONE (OUTPATIENT)
Dept: NEUROSURGERY | Facility: CLINIC | Age: 40
End: 2020-03-23

## 2020-03-23 NOTE — TELEPHONE ENCOUNTER
Spoke with pt. Explained appts need to be cx'd. She agreed.    Advised to reactivate Kaila and download MyChart.    Will cx all appts.

## 2020-04-15 ENCOUNTER — TELEPHONE (OUTPATIENT)
Dept: NEUROLOGY | Facility: CLINIC | Age: 40
End: 2020-04-15

## 2020-04-15 DIAGNOSIS — G35 MULTIPLE SCLEROSIS: Primary | ICD-10-CM

## 2020-04-15 NOTE — TELEPHONE ENCOUNTER
Patient due for Ocrevus next week. She has not had safety labs yet. We may have to send to her local [facility] or [facility]. She may opt to postpone infusion.

## 2020-04-16 NOTE — TELEPHONE ENCOUNTER
Spoke with pt's mom. F/U rescheduled to Monday 4/21 at 11AM. Pt's mom also aware we will fax over labs to their local [facility].

## 2020-04-21 ENCOUNTER — TELEPHONE (OUTPATIENT)
Dept: NEUROLOGY | Facility: CLINIC | Age: 40
End: 2020-04-21

## 2020-04-21 ENCOUNTER — OFFICE VISIT (OUTPATIENT)
Dept: NEUROLOGY | Facility: CLINIC | Age: 40
End: 2020-04-21
Payer: MEDICAID

## 2020-04-21 ENCOUNTER — DOCUMENTATION ONLY (OUTPATIENT)
Dept: NEUROLOGY | Facility: CLINIC | Age: 40
End: 2020-04-21

## 2020-04-21 VITALS — HEIGHT: 61 IN | BODY MASS INDEX: 34.99 KG/M2

## 2020-04-21 DIAGNOSIS — Z29.89 PROPHYLACTIC IMMUNOTHERAPY: ICD-10-CM

## 2020-04-21 DIAGNOSIS — Z71.89 COUNSELING REGARDING GOALS OF CARE: ICD-10-CM

## 2020-04-21 DIAGNOSIS — G35 MULTIPLE SCLEROSIS: Primary | ICD-10-CM

## 2020-04-21 DIAGNOSIS — E61.1 LOW IRON: ICD-10-CM

## 2020-04-21 DIAGNOSIS — Z79.899 HIGH RISK MEDICATION USE: ICD-10-CM

## 2020-04-21 PROCEDURE — 99213 OFFICE O/P EST LOW 20 MIN: CPT | Mod: 95,,, | Performed by: CLINICAL NURSE SPECIALIST

## 2020-04-21 PROCEDURE — 99213 PR OFFICE/OUTPT VISIT, EST, LEVL III, 20-29 MIN: ICD-10-PCS | Mod: 95,,, | Performed by: CLINICAL NURSE SPECIALIST

## 2020-04-21 RX ORDER — ACETAMINOPHEN 325 MG/1
325 TABLET ORAL EVERY 6 HOURS PRN
COMMUNITY

## 2020-04-21 NOTE — Clinical Note
Bettye is due for MRI, but wants to postpone this until July and try to get everything on same day when she comes back to clinic. Infusion is planned for next month. You ok with this? She is clinically stable.

## 2020-04-21 NOTE — TELEPHONE ENCOUNTER
----- Message from Maria L Gongora sent at 4/21/2020 10:10 AM CDT -----  Contact: jaki- bio script  Jaki asked if the pt will be continuing the medication, ocrevus.  She stated that she faxed over the paperwork yesterday    Contact info- [phone]

## 2020-04-21 NOTE — PROGRESS NOTES
Subjective:          Patient ID: Bettye Ch is a 39 y.o. female who presents today for a routine visit for MS.  She was last seen on 9/19/19. The history has been provided by the patient. Today's visit is a virtual visit.     The patient location is: her home  The chief complaint leading to consultation is: Multiple Sclerosis   Visit type: audiovisual  Total time spent with patient: 20 minutes   Each patient to whom he or she provides medical services by telemedicine is:  (1) informed of the relationship between the physician and patient and the respective role of any other health care provider with respect to management of the patient; and (2) notified that he or she may decline to receive medical services by telemedicine and may withdraw from such care at any time.    MS HPI:  · DMT: ocrelizumab due this month   · Side effects from DMT? No  · Taking vitamin D3 as recommended? Not taking   · Lab orders were faxed to [facility] on 4/16/20 for Ocrevus safety labs   She has not been taking iron, but plans to start this again.   She had been walking prior to coronavirus pandemic, but has not been exercising lately.   She denies any new or different neurological symptoms.   She has a video visit with Dr. Garcia planned.     Medications:  Current Outpatient Medications   Medication Sig    CALCIUM CARBONATE/VITAMIN D3 (VITAMIN D-3 ORAL) Take 4,000 Units by mouth once daily.     ferrous sulfate 325 (65 FE) MG EC tablet Take 325 mg by mouth once.    ocrelizumab (OCREVUS) 30 mg/mL Soln Infuse Ocrevus 600mg in 500mL of 0.9% NaCl IV every 24 weeks. Pre-meds: Solumedrol 100mg IVPB, Benadryl 50mg IVP, Tylenol 1000mg PO, Pepcid 20mg IVP. See attached protocol for dosing rate and infusion duration.       SOCIAL HISTORY  Social History     Tobacco Use    Smoking status: Never Smoker    Smokeless tobacco: Never Used   Substance Use Topics    Alcohol use: No    Drug use: No       Living arrangements - the patient lives  "with her family     ROS:    MS ROS:  · Fatigue: Energy level has been pretty good  · Sleep Disturbance: No, does not have regular sleep schedule; she sleeps when she wants to  · Bladder Dysfunction: No --normal; denies any UTIs   · Bowel Dysfunction: Has BM about 3-4 days a week, tries to drink a lot of water. She is not eating a lot of vegetables right now  · Spasticity: No  · Visual Symptoms: No  · Cognitive: Yes -She states "memory is pretty good."   · Mood Disorder: No  · Gait Disturbance: Yes -Stable  · Falls: No.   · Hand Dysfunction: None    · Pain: No.   · Sexual Dysfunction: Not Assessed  · Skin Breakdown: No  · Tremors: left hand tremor is improved.   · Dysphagia:  No  · Dysarthria:  No  · Heat sensitivity:  No; tries to avoid the extreme heat   · Any un-met adaptive needs? No   · Copay Assist: $0 copay for Ocrevus          Objective:          Neurologic Exam     Mental Status   Oriented to person, place, and time.   Attention: normal. Concentration: normal.   Speech: speech is normal   Level of consciousness: alert  Knowledge: good.   Normal comprehension.     Cranial Nerves     CN III, IV, VI   Right pupil: Shape: regular.   Left pupil: Shape: regular.     CN VII   Right facial weakness: none  Left facial weakness: none    CN VIII   Hearing: intact    CN XII   Tongue deviation: none    Motor Exam     She can lift her arms overhead, lower them, wiggle her fingers, and make a fist with each hand.      Sensory Exam     Mild to moderate decrease in vibratory sense to BLE.          Gait, Coordination, and Reflexes     Gait  Gait: wide-based    Coordination   Finger to nose coordination: abnormal (mild dysmetria  )    RSM very slightly slower in left hand compared to right.          Imaging:       Results for orders placed during the hospital encounter of 03/20/19   MRI Brain Demyelinating W W/O Contrast    Impression Stable appearance of the brain, again exhibiting findings compatible with reported history of " multiple sclerosis.  No new or enhancing foci to indicate ongoing or active demyelination    The pituitary gland is enlarged for age with slightly lobulated appearance.  An underlying lesion such as an adenoma not excluded.  Correlation with laboratory values and dedicated pituitary imaging if warranted.    This report was flagged in Epic as abnormal.      Electronically signed by: Miguel A Sims MD  Date:    03/20/2019  Time:    08:45       Labs:     Lab Results   Component Value Date    [id number] 27 (L) 09/19/2019    YPJNNCPQ57XB 60 03/20/2019    [id number] 28 (L) 09/17/2018     Lab Results   Component Value Date    WBC 5.27 09/19/2019    RBC 3.69 (L) 09/19/2019    HGB 10.6 (L) 09/19/2019    HCT 34.0 (L) 09/19/2019    MCV 92 09/19/2019    MCH 28.7 09/19/2019    MCHC 31.2 (L) 09/19/2019    RDW 13.7 09/19/2019     09/19/2019    MPV 10.6 09/19/2019    GRAN 3.3 09/19/2019    GRAN 61.8 09/19/2019    LYMPH 1.1 09/19/2019    LYMPH 21.1 09/19/2019    MONO 0.7 09/19/2019    MONO 12.5 09/19/2019    EOS 0.2 09/19/2019    BASO 0.04 09/19/2019    EOSINOPHIL 3.6 09/19/2019    BASOPHIL 0.8 09/19/2019     Sodium   Date Value Ref Range Status   09/17/2018 139 136 - 145 mmol/L Final     Potassium   Date Value Ref Range Status   09/17/2018 4.7 3.5 - 5.1 mmol/L Final     Chloride   Date Value Ref Range Status   09/17/2018 105 95 - 110 mmol/L Final     CO2   Date Value Ref Range Status   09/17/2018 25 23 - 29 mmol/L Final     Glucose   Date Value Ref Range Status   09/17/2018 82 70 - 110 mg/dL Final     BUN, Bld   Date Value Ref Range Status   09/17/2018 10 6 - 20 mg/dL Final     Creatinine   Date Value Ref Range Status   09/17/2018 0.6 0.5 - 1.4 mg/dL Final     Calcium   Date Value Ref Range Status   09/17/2018 9.9 8.7 - 10.5 mg/dL Final     Total Protein   Date Value Ref Range Status   09/17/2018 8.0 6.0 - 8.4 g/dL Final     Albumin   Date Value Ref Range Status   09/17/2018 4.0 3.5 - 5.2 g/dL Final     Total Bilirubin    Date Value Ref Range Status   09/17/2018 0.5 0.1 - 1.0 mg/dL Final     Comment:     For infants and newborns, interpretation of results should be based  on gestational age, weight and in agreement with clinical  observations.  Premature Infant recommended reference ranges:  Up to 24 hours.............<8.0 mg/dL  Up to 48 hours............<12.0 mg/dL  3-5 days..................<15.0 mg/dL  6-29 days.................<15.0 mg/dL       Alkaline Phosphatase   Date Value Ref Range Status   09/17/2018 90 55 - 135 U/L Final     AST   Date Value Ref Range Status   09/17/2018 12 10 - 40 U/L Final     ALT   Date Value Ref Range Status   09/17/2018 8 (L) 10 - 44 U/L Final     Anion Gap   Date Value Ref Range Status   09/17/2018 9 8 - 16 mmol/L Final     eGFR if    Date Value Ref Range Status   09/17/2018 >60.0 >60 mL/min/1.73 m^2 Final     eGFR if non    Date Value Ref Range Status   09/17/2018 >60.0 >60 mL/min/1.73 m^2 Final     Comment:     Calculation used to obtain the estimated glomerular filtration  rate (eGFR) is the CKD-EPI equation.        Lab Results   Component Value Date    HEPBSAG Negative 09/19/2019    HEPBSAB Positive (A) 09/19/2019    HEPBCAB Negative 09/19/2019           MS Impression and Plan:     NEURO MULTIPLE SCLEROSIS IMPRESSION:   MS Status:     Number of relapses in the past year?:  0    Clinical Progression:  Clinically Stable  Plan:     DMT:  No change in management    DMT comment:  Continue Ocrevus and Vitamin D. She would like to postpone her Ocrevus infusion to the 3rd week of May, around the 18th or 20th (in light of COVID). She is due for labs now for CBC, CD20, hep B, and Vitamin D. I have also added an order for ferritin to check status of iron deficiency. All orders have been faxed to local [facility].     Symptom Management:  No change in symptom management       We will try to arrange MRIs, neuro-ophthalmology appt with Dr. Su, neurosurgery with Dr. Garcia, and  appt with Dr. Gonzalez on all the same day in July (if possible).       Our visit today lasted 20 minutes, and 100% of this time was spent face to face with the patient. Over 50% of this visit included discussion of the treatment plan/exam findings/coordination of care. The patient agrees with the plan of care. She will follow up in July.     LAMAR Orellana, CNS     Problem List Items Addressed This Visit        Neurologic Problems    Multiple sclerosis - Primary       Other    Prophylactic immunotherapy      Other Visit Diagnoses     Low iron        Relevant Orders    Ferritin    Counseling regarding goals of care        High risk medication use

## 2020-04-21 NOTE — Clinical Note
BB, please schedule MRI in July, followed by f/u appts with Radha Su, and Carlos on the same day if possible. Please link my order to the MRI appt.

## 2020-04-21 NOTE — PROGRESS NOTES
Received continuation of treatment orders from [facility] awaiting signature. Placed in Anju's folder.

## 2020-04-21 NOTE — Clinical Note
Bettye Peraza would like to shoot for week of 5/18 for Ocrevus infusion at [facility]. She is due for labs now, and all orders have been faxed to her local [facility] lab.

## 2020-04-22 ENCOUNTER — TELEPHONE (OUTPATIENT)
Dept: NEUROLOGY | Facility: CLINIC | Age: 40
End: 2020-04-22

## 2020-04-22 NOTE — TELEPHONE ENCOUNTER
----- Message from LAMAR Peter, CNS sent at 4/22/2020  1:22 PM CDT -----  BB, please schedule MRI in July, followed by f/u appts with Radha Su, and Carlos on the same day if possible. Please link my order to the MRI appt.

## 2020-04-29 ENCOUNTER — TELEPHONE (OUTPATIENT)
Dept: NEUROLOGY | Facility: CLINIC | Age: 40
End: 2020-04-29

## 2020-04-29 NOTE — TELEPHONE ENCOUNTER
----- Message from LAMAR Peter, CNS sent at 4/22/2020  1:20 PM CDT -----  Bettye Peraza would like to shoot for week of 5/18 for Ocrevus infusion at [facility]. She is due for labs now, and all orders have been faxed to her local [facility] lab.

## 2020-05-07 NOTE — TELEPHONE ENCOUNTER
Received CBC, CD20, Hep B and Ferritin labs, completed on 5/1, from [facility]. Uploaded for review. Labs reviewed and signed by Anju. Faxed OcrLittle1us rx to [facility] at [phone].

## 2020-05-07 NOTE — TELEPHONE ENCOUNTER
Reviewed labs done on 5/1 at [facility]:    WBC=6.6  RBC=3.59 (low)  Hgb=10.1(low)  Hct=31.2 (low)  NRS=6819  Ferritin=15 (low)  Hep B Surface Ab positive (past vaccination)  Hep B Surface Ag negative  Hep B Core Ab negative  CD19,20=0    Ok to infuse.  She is still anemic and iron deficient. She needs to follow up with PCP. Please let patient and her mother know.

## 2020-06-05 ENCOUNTER — TELEPHONE (OUTPATIENT)
Dept: NEUROLOGY | Facility: CLINIC | Age: 40
End: 2020-06-05

## 2020-06-05 NOTE — TELEPHONE ENCOUNTER
Reached out to patient's mother to inform it is not necessary from an MS standpoint to move her MRI up. Advised that if Bettye has confusion, new headaches, or pain to follow up with primary. Vicki verbalized understanding
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
## 2020-08-11 ENCOUNTER — OFFICE VISIT (OUTPATIENT)
Dept: OPHTHALMOLOGY | Facility: CLINIC | Age: 40
End: 2020-08-11
Payer: MEDICAID

## 2020-08-11 ENCOUNTER — OFFICE VISIT (OUTPATIENT)
Dept: NEUROLOGY | Facility: CLINIC | Age: 40
End: 2020-08-11
Payer: MEDICAID

## 2020-08-11 ENCOUNTER — HOSPITAL ENCOUNTER (OUTPATIENT)
Dept: RADIOLOGY | Facility: HOSPITAL | Age: 40
Discharge: HOME | End: 2020-08-11
Attending: CLINICAL NURSE SPECIALIST
Payer: MEDICAID

## 2020-08-11 ENCOUNTER — CLINICAL SUPPORT (OUTPATIENT)
Dept: OPHTHALMOLOGY | Facility: CLINIC | Age: 40
End: 2020-08-11
Payer: MEDICAID

## 2020-08-11 VITALS
WEIGHT: 194.13 LBS | DIASTOLIC BLOOD PRESSURE: 64 MMHG | BODY MASS INDEX: 36.65 KG/M2 | TEMPERATURE: 98 F | SYSTOLIC BLOOD PRESSURE: 119 MMHG | HEART RATE: 64 BPM | HEIGHT: 61 IN

## 2020-08-11 DIAGNOSIS — G35 MULTIPLE SCLEROSIS: ICD-10-CM

## 2020-08-11 DIAGNOSIS — Z71.89 COUNSELING REGARDING GOALS OF CARE: ICD-10-CM

## 2020-08-11 DIAGNOSIS — D84.9 IMMUNOSUPPRESSION: ICD-10-CM

## 2020-08-11 DIAGNOSIS — E23.6 ENLARGED PITUITARY GLAND: ICD-10-CM

## 2020-08-11 DIAGNOSIS — R26.9 GAIT DISTURBANCE: ICD-10-CM

## 2020-08-11 DIAGNOSIS — F09 COGNITIVE DYSFUNCTION ACCOMPANYING MULTIPLE SCLEROSIS: ICD-10-CM

## 2020-08-11 DIAGNOSIS — G35 MS (MULTIPLE SCLEROSIS): Primary | ICD-10-CM

## 2020-08-11 DIAGNOSIS — G35 COGNITIVE DYSFUNCTION ACCOMPANYING MULTIPLE SCLEROSIS: ICD-10-CM

## 2020-08-11 DIAGNOSIS — E23.6 MASS OF PITUITARY: Primary | ICD-10-CM

## 2020-08-11 DIAGNOSIS — Z86.39 HISTORY OF ENLARGEMENT OF PITUITARY GLAND: ICD-10-CM

## 2020-08-11 PROCEDURE — 99999 PR PBB SHADOW E&M-EST. PATIENT-LVL III: CPT | Mod: PBBFAC,,, | Performed by: PSYCHIATRY & NEUROLOGY

## 2020-08-11 PROCEDURE — 92083 EXTENDED VISUAL FIELD XM: CPT | Mod: PBBFAC | Performed by: OPHTHALMOLOGY

## 2020-08-11 PROCEDURE — 92004 PR EYE EXAM, NEW PATIENT,COMPREHESV: ICD-10-PCS | Mod: S$PBB,,, | Performed by: OPHTHALMOLOGY

## 2020-08-11 PROCEDURE — 70551 MRI BRAIN DEMYELINATING WITHOUT CONTRAST: ICD-10-PCS | Mod: 26,,, | Performed by: RADIOLOGY

## 2020-08-11 PROCEDURE — 99999 PR PBB SHADOW E&M-EST. PATIENT-LVL III: ICD-10-PCS | Mod: PBBFAC,,, | Performed by: OPHTHALMOLOGY

## 2020-08-11 PROCEDURE — 99215 PR OFFICE/OUTPT VISIT, EST, LEVL V, 40-54 MIN: ICD-10-PCS | Mod: S$PBB,,, | Performed by: PSYCHIATRY & NEUROLOGY

## 2020-08-11 PROCEDURE — 70551 MRI BRAIN STEM W/O DYE: CPT | Mod: TC

## 2020-08-11 PROCEDURE — 92004 COMPRE OPH EXAM NEW PT 1/>: CPT | Mod: S$PBB,,, | Performed by: OPHTHALMOLOGY

## 2020-08-11 PROCEDURE — 99999 PR PBB SHADOW E&M-EST. PATIENT-LVL III: ICD-10-PCS | Mod: PBBFAC,,, | Performed by: PSYCHIATRY & NEUROLOGY

## 2020-08-11 PROCEDURE — 99213 OFFICE O/P EST LOW 20 MIN: CPT | Mod: PBBFAC,25 | Performed by: OPHTHALMOLOGY

## 2020-08-11 PROCEDURE — 70551 MRI BRAIN STEM W/O DYE: CPT | Mod: 26,59,GC, | Performed by: RADIOLOGY

## 2020-08-11 PROCEDURE — 99999 PR PBB SHADOW E&M-EST. PATIENT-LVL III: CPT | Mod: PBBFAC,,, | Performed by: OPHTHALMOLOGY

## 2020-08-11 PROCEDURE — 99215 OFFICE O/P EST HI 40 MIN: CPT | Mod: S$PBB,,, | Performed by: PSYCHIATRY & NEUROLOGY

## 2020-08-11 PROCEDURE — 92083 HUMPHREY VISUAL FIELD - OU - BOTH EYES: ICD-10-PCS | Mod: 26,S$PBB,, | Performed by: OPHTHALMOLOGY

## 2020-08-11 PROCEDURE — 70551 MRI BRAIN STEM W/O DYE: CPT | Mod: 26,,, | Performed by: RADIOLOGY

## 2020-08-11 PROCEDURE — 99213 OFFICE O/P EST LOW 20 MIN: CPT | Mod: PBBFAC,25,27 | Performed by: PSYCHIATRY & NEUROLOGY

## 2020-08-11 NOTE — PROGRESS NOTES
HPI     Referred by /  Hx of Multiple Sclerosis.  Hx of enlargement of the pituitary gland.  Patient seem stable.  No eye pain.    I have personally interviewed the patient, reviewed the history and   examined the patient and agree with the technician's &/or resident's exam,   assessment and plan.    Last edited by Miguel A Su MD on 8/11/2020 11:46 AM. (History)            Assessment /Plan     For exam results, see Encounter Report.    Mass of pituitary  -     Russell Visual Field - OU - Extended - Both Eyes    Multiple sclerosis      I found no evidence of visual pathway damage or dysfunction of cranial nerves related to the pituitary mass and MS. I will repeat her exam in one year or sooner if requested.

## 2020-08-11 NOTE — PROGRESS NOTES
Subjective:          Patient ID: Bettye Ch is a 40 y.o. female who presents today for a routine clinic visit for MS.  She comes in with her mother.    MS HPI:  · DMT: ocrelizumab --May / November, [facility]  · Side effects from DMT? No  · Taking vitamin D3 as recommended? Yes -  5,000   · Pt feels relatively stable; no sense of relapse;   · Not on any meds other than Ocrevus    Medications:  Current Outpatient Medications   Medication Sig    acetaminophen (TYLENOL) 325 MG tablet Take 325 mg by mouth every 6 (six) hours as needed for Pain.    CALCIUM CARBONATE/VITAMIN D3 (VITAMIN D-3 ORAL) Take 4,000 Units by mouth once daily.     ferrous sulfate 325 (65 FE) MG EC tablet Take 325 mg by mouth once.    MULTIVITAMIN ORAL Take 1 tablet by mouth once daily.    ocrelizumab (OCREVUS) 30 mg/mL Soln Infuse Ocrevus 600mg in 500mL of 0.9% NaCl IV every 24 weeks. Pre-meds: Solumedrol 100mg IVPB, Benadryl 50mg IVP, Tylenol 1000mg PO, Pepcid 20mg IVP. See attached protocol for dosing rate and infusion duration.     No current facility-administered medications for this visit.        SOCIAL HISTORY  Social History     Tobacco Use    Smoking status: Never Smoker    Smokeless tobacco: Never Used   Substance Use Topics    Alcohol use: No    Drug use: No       Living arrangements - the patient lives with their family.      REVIEW OF SYMPTOMS 8/11/2020   Do you feel abnormally tired on most days? No   Do you feel you generally sleep well? Yes   Do you have difficulty controlling your bladder?  No   Do you have difficulty controlling your bowels?  No   Do you have frequent muscle cramps, tightness or spasms in your limbs?  No   Do you have new visual symptoms?  No   Do you have worsening difficulty with your memory or thinking? No   Do you have worsening symptoms of anxiety or depression?  No   For patients who walk, Do you have more difficulty walking?  No   Have you fallen since your last visit?  No   For  patients who use wheelchairs: Do you have any skin wounds or breakdown? Not Applicable   Do you have difficulty using your hands?  No   Do you have shooting or burning pain? No   Do you often choke when swallowing liquids or solid food?  No   Do you experience worsening symptoms when overheated? No   Do you need any new equipment such as a wheelchair, walker or shower chair? No   Do you receive co-pay financial assistance for your principal MS medicine? Yes   Would you be interested in participating in an MS research trial in the future? No   For patients on Gilenya, Tecfidera, Aubagio, Rituxan, Ocrevus, Tysabri, Lemtrada or Methotrexate, are you aware that you should NOT receive live virus vaccines?  Yes   Do you feel you have adequate family/friend support?  Yes   Do you have health insurance?   Yes   Are you currently employed? No   Do you receive SSDI/SSI?  No   Do you use marijuana or cannabis products? No   Have you been diagnosed with a urinary tract infection since your last visit here? No   Have you been diagnosed with a respiratory tract infection since your last visit here? No   Have you been to the emergency room since your last visit here? No   Have you been hospitalized since your last visit here?  No                Objective:        1. 25 foot timed walk: was 5.7 seconds in Sept 2019  Timed 25 Foot Walk: 8/11/2020   Did patient wear an AFO? No   Was assistive device used? No   Time for 25 Foot Walk (seconds) 6.1   Time for 25 Foot Walk (seconds) 6.2       Neurologic Exam    MENTAL STATUS:   moderate bradyphrenia; language is grossly    fluent with normal verbal comprehension,  CRANIAL NERVE EXAM:    Pupils are equal, round, and reactive to light.  No facial    asymmetry.  Facial sensation is intact bilaterally.  There is mild dysarthria.     MOTOR EXAM:  She has slightly slow rapid sequential movements in the hands    bilaterally.  Upper extremity strength is grossly full.  Right and left lower     extremity flexors have mild weakness in all groups, graded at about 5-/5 in all    groups.  REFLEXES:  Are 2+ and symmetric throughout.  She has a left Babinski.   COORDINATION:  She has slight dystaxia on finger-to-nose bilaterally.  GAIT:  Slightly wide based.    Imaging:     Results for orders placed during the hospital encounter of 08/11/20   MRI Brain Demyelinating Without Contrast    Impression Unchanged appearance of the brain, again exhibiting findings compatible with given history of multiple sclerosis.  No new discrete lesion or evidence of active demyelination.    Electronically signed by resident: Beto Purcell  Date:    08/11/2020  Time:    11:07    Electronically signed by: Everardo Greenwood MD  Date:    08/11/2020  Time:    11:25     No results found for this or any previous visit.  No results found for this or any previous visit.  Results for orders placed during the hospital encounter of 03/20/19   MRI Brain Demyelinating W W/O Contrast    Impression Stable appearance of the brain, again exhibiting findings compatible with reported history of multiple sclerosis.  No new or enhancing foci to indicate ongoing or active demyelination    The pituitary gland is enlarged for age with slightly lobulated appearance.  An underlying lesion such as an adenoma not excluded.  Correlation with laboratory values and dedicated pituitary imaging if warranted.    This report was flagged in Epic as abnormal.      Electronically signed by: Miguel A Sims MD  Date:    03/20/2019  Time:    08:45     Results for orders placed during the hospital encounter of 09/17/18   MRI Cervical Spine Demyelinating W W/O Contrast    Impression Limited examination, as detailed above.    Extensive findings in the cerebral white matter, cerebellum, brainstem, and cervical spine which are typical for multiple sclerosis.  There are new enhancing lesions in the brainstem and cerebral white matter suggestive of active disease.  The burden of  disease in the cervical cord has progressed when compared to MR examination 03/15/2017; however, there is no definite evidence of active disease.  There are overall similar but fewer scattered focal findings throughout the thoracic spine also typical for multiple sclerosis, with 2 punctate enhancing foci posterior to T7 which may represent posterior spinal artery enhancement; however, active demyelination cannot be completely excluded.  Recommend clinical correlation and continued follow-up.    1.7 cm left renal cyst.    Additional findings as above.    COMMUNICATION  This critical result was discovered/received at 1530.  The critical information above was relayed directly by me by telephone to Louise Gonzalez MD on 09/17/2018 at 1800.    Electronically signed by resident: Mateo Panchal  Date:    09/17/2018  Time:    17:31    Electronically signed by: Magan Murphy MD  Date:    09/17/2018  Time:    21:31     Results for orders placed during the hospital encounter of 09/17/18   MRI Thoracic Spine Demyelinating W W/O Contrast    Impression Limited examination, as detailed above.    Extensive findings in the cerebral white matter, cerebellum, brainstem, and cervical spine which are typical for multiple sclerosis.  There are new enhancing lesions in the brainstem and cerebral white matter suggestive of active disease.  The burden of disease in the cervical cord has progressed when compared to MR examination 03/15/2017; however, there is no definite evidence of active disease.  There are overall similar but fewer scattered focal findings throughout the thoracic spine also typical for multiple sclerosis, with 2 punctate enhancing foci posterior to T7 which may represent posterior spinal artery enhancement; however, active demyelination cannot be completely excluded.  Recommend clinical correlation and continued follow-up.    1.7 cm left renal cyst.    Additional findings as above.    COMMUNICATION  This critical result  was discovered/received at 1530.  The critical information above was relayed directly by me by telephone to Louise Gonzalez MD on 09/17/2018 at 1800.    Electronically signed by resident: Mateo Panchal  Date:    09/17/2018  Time:    17:31    Electronically signed by: Magan Murphy MD  Date:    09/17/2018  Time:    21:31         Labs:     Lab Results   Component Value Date    [id number] 27 (L) 09/19/2019    [id number] 60 03/20/2019    EQZQQRIN51WT 28 (L) 09/17/2018     Lab Results   Component Value Date    JCVINDEX SEE COMMENT (A) 03/24/2015    JCVANTIBODY SEE COMMENT 03/24/2015     Lab Results   Component Value Date    IX0KQKUF 72.6 03/15/2017    ABSOLUTECD3 1472 03/15/2017    OU1QDBMX 18.1 03/15/2017    ABSOLUTECD8 367 03/15/2017    RO2ATTET 52.4 03/15/2017    ABSOLUTECD4 1062 03/15/2017    LABCD48 2.89 03/15/2017     Lab Results   Component Value Date    WBC 5.27 09/19/2019    RBC 3.69 (L) 09/19/2019    HGB 10.6 (L) 09/19/2019    HCT 34.0 (L) 09/19/2019    MCV 92 09/19/2019    MCH 28.7 09/19/2019    MCHC 31.2 (L) 09/19/2019    RDW 13.7 09/19/2019     09/19/2019    MPV 10.6 09/19/2019    GRAN 3.3 09/19/2019    GRAN 61.8 09/19/2019    LYMPH 1.1 09/19/2019    LYMPH 21.1 09/19/2019    MONO 0.7 09/19/2019    MONO 12.5 09/19/2019    EOS 0.2 09/19/2019    BASO 0.04 09/19/2019    EOSINOPHIL 3.6 09/19/2019    BASOPHIL 0.8 09/19/2019     Sodium   Date Value Ref Range Status   09/17/2018 139 136 - 145 mmol/L Final     Potassium   Date Value Ref Range Status   09/17/2018 4.7 3.5 - 5.1 mmol/L Final     Chloride   Date Value Ref Range Status   09/17/2018 105 95 - 110 mmol/L Final     CO2   Date Value Ref Range Status   09/17/2018 25 23 - 29 mmol/L Final     Glucose   Date Value Ref Range Status   09/17/2018 82 70 - 110 mg/dL Final     BUN, Bld   Date Value Ref Range Status   09/17/2018 10 6 - 20 mg/dL Final     Creatinine   Date Value Ref Range Status   09/17/2018 0.6 0.5 - 1.4 mg/dL Final     Calcium   Date Value  Ref Range Status   09/17/2018 9.9 8.7 - 10.5 mg/dL Final     Total Protein   Date Value Ref Range Status   09/17/2018 8.0 6.0 - 8.4 g/dL Final     Albumin   Date Value Ref Range Status   09/17/2018 4.0 3.5 - 5.2 g/dL Final     Total Bilirubin   Date Value Ref Range Status   09/17/2018 0.5 0.1 - 1.0 mg/dL Final     Comment:     For infants and newborns, interpretation of results should be based  on gestational age, weight and in agreement with clinical  observations.  Premature Infant recommended reference ranges:  Up to 24 hours.............<8.0 mg/dL  Up to 48 hours............<12.0 mg/dL  3-5 days..................<15.0 mg/dL  6-29 days.................<15.0 mg/dL       Alkaline Phosphatase   Date Value Ref Range Status   09/17/2018 90 55 - 135 U/L Final     AST   Date Value Ref Range Status   09/17/2018 12 10 - 40 U/L Final     ALT   Date Value Ref Range Status   09/17/2018 8 (L) 10 - 44 U/L Final     Anion Gap   Date Value Ref Range Status   09/17/2018 9 8 - 16 mmol/L Final     eGFR if    Date Value Ref Range Status   09/17/2018 >60.0 >60 mL/min/1.73 m^2 Final     eGFR if non    Date Value Ref Range Status   09/17/2018 >60.0 >60 mL/min/1.73 m^2 Final     Comment:     Calculation used to obtain the estimated glomerular filtration  rate (eGFR) is the CKD-EPI equation.        Lab Results   Component Value Date    HEPBSAG Negative 09/19/2019    HEPBSAB Positive (A) 09/19/2019    HEPBCAB Negative 09/19/2019           MS Impression and Plan:     NEURO MULTIPLE SCLEROSIS IMPRESSION:   MS Status:     Number of relapses in the past year?:  0    Clinical Progression:  Clinically Stable    MRI Progression:  Stable  Plan:     DMT:  No change in management    Symptom Management:  No change in symptom management     Next Imaging Due: 8/18/2021     Next Labs Due: 10/18/2020     --Clara to explore SSI   --external labs in October  --Ocreuvs in November  --exercise   Virtual visit in 6 mo with  Anju Christopher CNS          Our visit today lasted 40 minutes, and 100% of this time was spent face to face with the patient. Over 50% of this visit included discussion of the treatment plan/medication changes/symptom management/exam findings/imaging results/coordination of care. The patient agrees with the plan of care.    Problem List Items Addressed This Visit        1 - High    MS (multiple sclerosis) - Primary    Relevant Orders    CBC auto differential    Rituxan Sensitivity    Hepatitis B Core Antibody, Total    Hepatitis B Surface Ab, Qualitative    Hepatitis B Surface Antigen       2     Cognitive dysfunction accompanying multiple sclerosis       4     Gait disturbance       Unprioritized    Immunosuppression      Other Visit Diagnoses     Counseling regarding goals of care              Louise Gonzalez MD

## 2020-08-11 NOTE — PROGRESS NOTES
24-2  SS DONE OU     REL & FIX =  GOOD OU       COOP =    GOOD     PATIENT DENIES ANY ALLERGIES TO LATEX/ADHESIVES AT THIS TIME    JTHOMAS    NO MRX   AGE CORRECTION LENS OU   +1.00  OD  +1.00  OS

## 2020-08-11 NOTE — Clinical Note
D, perhaps this has been addressed in the past.  This patient lives with her parents.  Has Medicaid, but not SSDI, no SSI.  She is cognitively impaired.  She worked briefly when in college, but I doubt she'd qualify for SSDI.  But wonder about SSI. Mind taking a look?  Or calling her mom? thanks

## 2020-08-11 NOTE — LETTER
[facility] - Ophthalmology  [address]  [place] [address]  Phone: [phone]  Fax: [phone]   August 11, 2020    LAMAR Peter, CNS  [address] [facility] [address]    Patient: Bettye Ch   MR Number: [id number]   YOB: 1980   Date of Visit: 8/11/2020       Dear Dr. Christopher:    Thank you for referring Bettye Ch to me for evaluation. Here is my assessment and plan of care:    Assessment/Plan     For exam results, see Encounter Report.    Mass of pituitary  -     Russell Visual Field - OU - Extended - Both Eyes    Multiple sclerosis      I found no evidence of visual pathway damage or dysfunction of cranial nerves related to the pituitary mass and MS. I will repeat her exam in one year or sooner if requested.          Below you will find my full exam findings. If you have questions, please do not hesitate to call me. I look forward to following Ms. Bettye Ch along with you.    Sincerely,          Miguel A Su MD       CC  Jordan Garcia MD             Base Eye Exam     Visual Acuity (Snellen - Linear)       Right Left    Dist sc 20/30 20/25          Tonometry (Applanation, 11:52 AM)       Right Left    Pressure 15 16          Pupils       Dark Light Shape React APD    Right 4 2 Round Brisk None    Left 4 2 Round Brisk None          Visual Fields    See HVF report           Extraocular Movement       Right Left     Full, Ortho Full, Ortho   No evidence of CHIKIS.           Neuro/Psych     Oriented x3: Yes    Mood/Affect: Normal          Dilation     Both eyes: 1% Mydriacyl, 2.5% Phenylephrine @ 11:52 AM            Slit Lamp and Fundus Exam     External Exam       Right Left    External Normal Normal          Slit Lamp Exam       Right Left    Lids/Lashes Normal Normal    Conjunctiva/Sclera White and quiet White and quiet    Cornea Clear Clear    Anterior Chamber Deep and quiet Deep and quiet    Iris Round and reactive  Round and reactive    Lens Clear Clear    Vitreous Normal Normal          Fundus Exam       Right Left    Disc Normal Normal    C/D Ratio 0.3 0.3    Macula Normal Normal    Vessels Normal Normal    Periphery Normal Normal

## 2020-08-11 NOTE — Clinical Note
All set for Ocrevus in November; labs externally planned for October, [facility];  infusion at [email or web address] in [place];

## 2020-08-13 ENCOUNTER — TELEPHONE (OUTPATIENT)
Dept: NEUROSURGERY | Facility: CLINIC | Age: 40
End: 2020-08-13

## 2020-08-18 ENCOUNTER — OFFICE VISIT (OUTPATIENT)
Dept: NEUROSURGERY | Facility: CLINIC | Age: 40
End: 2020-08-18
Payer: MEDICAID

## 2020-08-18 DIAGNOSIS — D35.2 PITUITARY MICROADENOMA: Primary | ICD-10-CM

## 2020-08-18 PROBLEM — D84.9 IMMUNOSUPPRESSION: Status: ACTIVE | Noted: 2020-08-18

## 2020-08-18 PROCEDURE — 99214 PR OFFICE/OUTPT VISIT, EST, LEVL IV, 30-39 MIN: ICD-10-PCS | Mod: 95,,, | Performed by: NEUROLOGICAL SURGERY

## 2020-08-18 PROCEDURE — 99214 OFFICE O/P EST MOD 30 MIN: CPT | Mod: 95,,, | Performed by: NEUROLOGICAL SURGERY

## 2020-08-18 NOTE — PROGRESS NOTES
Subjective:   I, Mamadou Vickers, attest that this documentation has been prepared under the direction and in the presence of Jordan Garcia MD.     Patient ID: Bettye Ch is a 40 y.o. female     Chief Complaint: No chief complaint on file.    The patient location is: home  The chief complaint leading to consultation is: pituitary microadenoma  Visit type: Virtual visit with synchronous audio and video.  Total time spent with patient: 8 minutes  Each patient to whom he or she provides medical services by telemedicine is:  (1) informed of the relationship between the physician and patient and the respective role of any other health care provider with respect to management of the patient; and (2) notified that he or she may decline to receive medical services by telemedicine and may withdraw from such care at any time.      HPI  Ms. Bettye Ch is a pleasant 40 y.o. woman with a hx of multiple sclerosis and pituitary microadenoma who presents today for her 1 year follow up with MRI brain. Pt was last seen in clinic on 05/14/2019, at which time she denied any issues.     Pt was recently seen by Dr. Su on 08/11/2020, who found no evidence of visual pathway damage or dysfunction of cranial nerves related to the pituitary mass and MS. Pt states she has continued to do well and has no complaints at this time. She denies any vision changes.      Review of Systems   Constitutional: Negative for activity change, fatigue, fever and unexpected weight change.   HENT: Negative for facial swelling.    Eyes: Negative.    Respiratory: Negative.    Cardiovascular: Negative.    Gastrointestinal: Negative for diarrhea, nausea and vomiting.   Genitourinary: Negative.    Musculoskeletal: Negative for back pain, joint swelling, myalgias and neck pain.   Neurological: Negative for dizziness, weakness, numbness and headaches.   Psychiatric/Behavioral: Negative.       Past Medical History:   Diagnosis Date     Multiple sclerosis        Objective:    There were no vitals filed for this visit.   Physical Exam  Constitutional:       General: She is not in acute distress.     Appearance: She is well-developed.   HENT:      Head: Normocephalic and atraumatic.   Neurological:      General: No focal deficit present.      Mental Status: She is alert and oriented to person, place, and time.   Psychiatric:         Mood and Affect: Mood normal.         Thought Content: Thought content normal.            IMAGING:  MRI Brain Demyelinating Without Contrast (08/11/2020) shows an unchanged microadenoma when compared to MRI brain from 2019.    I have personally reviewed the images with the pt.      I, Dr. Jordan Garcia, personally performed the services described in this documentation. All medical record entries made by the scribe, Mamadou Vickers, were at my direction and in my presence.  I have reviewed the chart and agree that the record reflects my personal performance and is accurate and complete. Jordan Garcia MD. 08/18/2020    Assessment:       Pituitary microadenoma.   Plan:   I have personally reviewed the MRI brain with the pt which shows an unchanged microadenoma when compared to MRI brain from 2019.    I will schedule the patient for 2 year follow up with MRI brain. Pt advised to contact us with any questions, concerns, or if she experiences any new or worsening symptoms in the interim.

## 2020-08-18 NOTE — PATIENT INSTRUCTIONS
I have personally reviewed the MRI brain with the pt which shows an unchanged microadenoma when compared to MRI brain from 2019.    I will schedule the patient for 2 year follow up with MRI brain. Pt advised to contact us with any questions, concerns, or if she experiences any new or worsening symptoms in the interim.

## 2020-11-02 ENCOUNTER — PATIENT MESSAGE (OUTPATIENT)
Dept: PSYCHIATRY | Facility: CLINIC | Age: 40
End: 2020-11-02

## 2020-12-08 NOTE — TELEPHONE ENCOUNTER
AP, please add MS dx and let me know when done.   Calcipotriene Pregnancy And Lactation Text: This medication has not been proven safe during pregnancy. It is unknown if this medication is excreted in breast milk.

## 2021-02-05 ENCOUNTER — PATIENT MESSAGE (OUTPATIENT)
Dept: NEUROLOGY | Facility: CLINIC | Age: 41
End: 2021-02-05

## 2021-02-11 ENCOUNTER — TELEPHONE (OUTPATIENT)
Dept: NEUROLOGY | Facility: CLINIC | Age: 41
End: 2021-02-11

## 2021-02-23 ENCOUNTER — OFFICE VISIT (OUTPATIENT)
Dept: NEUROLOGY | Facility: CLINIC | Age: 41
End: 2021-02-23
Payer: MEDICAID

## 2021-02-23 DIAGNOSIS — G35 MULTIPLE SCLEROSIS: Primary | ICD-10-CM

## 2021-02-23 DIAGNOSIS — Z29.89 PROPHYLACTIC IMMUNOTHERAPY: ICD-10-CM

## 2021-02-23 DIAGNOSIS — Z71.89 COUNSELING REGARDING GOALS OF CARE: ICD-10-CM

## 2021-02-23 DIAGNOSIS — R26.9 GAIT DISTURBANCE: ICD-10-CM

## 2021-02-23 DIAGNOSIS — Z79.899 HIGH RISK MEDICATION USE: ICD-10-CM

## 2021-02-23 PROCEDURE — 99214 OFFICE O/P EST MOD 30 MIN: CPT | Mod: 95,,, | Performed by: CLINICAL NURSE SPECIALIST

## 2021-02-23 PROCEDURE — 99214 PR OFFICE/OUTPT VISIT, EST, LEVL IV, 30-39 MIN: ICD-10-PCS | Mod: 95,,, | Performed by: CLINICAL NURSE SPECIALIST

## 2021-02-25 ENCOUNTER — TELEPHONE (OUTPATIENT)
Dept: NEUROLOGY | Facility: CLINIC | Age: 41
End: 2021-02-25

## 2021-02-26 ENCOUNTER — TELEPHONE (OUTPATIENT)
Dept: NEUROLOGY | Facility: CLINIC | Age: 41
End: 2021-02-26

## 2021-03-10 ENCOUNTER — PATIENT MESSAGE (OUTPATIENT)
Dept: NEUROLOGY | Facility: CLINIC | Age: 41
End: 2021-03-10

## 2021-04-26 ENCOUNTER — TELEPHONE (OUTPATIENT)
Dept: NEUROLOGY | Facility: CLINIC | Age: 41
End: 2021-04-26

## 2021-04-26 DIAGNOSIS — G35 MULTIPLE SCLEROSIS: Primary | ICD-10-CM

## 2021-04-30 ENCOUNTER — PATIENT MESSAGE (OUTPATIENT)
Dept: NEUROLOGY | Facility: CLINIC | Age: 41
End: 2021-04-30

## 2021-05-04 ENCOUNTER — TELEPHONE (OUTPATIENT)
Dept: NEUROLOGY | Facility: CLINIC | Age: 41
End: 2021-05-04

## 2021-05-31 ENCOUNTER — PATIENT MESSAGE (OUTPATIENT)
Dept: PSYCHIATRY | Facility: CLINIC | Age: 41
End: 2021-05-31

## 2021-07-06 ENCOUNTER — DOCUMENTATION ONLY (OUTPATIENT)
Dept: NEUROLOGY | Facility: CLINIC | Age: 41
End: 2021-07-06

## 2021-08-26 ENCOUNTER — TELEPHONE (OUTPATIENT)
Dept: NEUROLOGY | Facility: CLINIC | Age: 41
End: 2021-08-26

## 2021-08-26 ENCOUNTER — HOSPITAL ENCOUNTER (OUTPATIENT)
Dept: RADIOLOGY | Facility: HOSPITAL | Age: 41
Discharge: HOME | End: 2021-08-26
Attending: CLINICAL NURSE SPECIALIST
Payer: MEDICAID

## 2021-08-26 ENCOUNTER — OFFICE VISIT (OUTPATIENT)
Dept: NEUROLOGY | Facility: CLINIC | Age: 41
End: 2021-08-26
Payer: MEDICAID

## 2021-08-26 VITALS
HEIGHT: 61 IN | BODY MASS INDEX: 38.4 KG/M2 | SYSTOLIC BLOOD PRESSURE: 101 MMHG | HEART RATE: 100 BPM | WEIGHT: 203.38 LBS | DIASTOLIC BLOOD PRESSURE: 65 MMHG

## 2021-08-26 DIAGNOSIS — Z71.89 COUNSELING REGARDING GOALS OF CARE: ICD-10-CM

## 2021-08-26 DIAGNOSIS — G35 MULTIPLE SCLEROSIS: Primary | ICD-10-CM

## 2021-08-26 DIAGNOSIS — G35 MULTIPLE SCLEROSIS: ICD-10-CM

## 2021-08-26 DIAGNOSIS — Z29.89 PROPHYLACTIC IMMUNOTHERAPY: ICD-10-CM

## 2021-08-26 DIAGNOSIS — Z79.899 HIGH RISK MEDICATION USE: ICD-10-CM

## 2021-08-26 PROCEDURE — 70551 MRI BRAIN STEM W/O DYE: CPT | Mod: 26,,, | Performed by: RADIOLOGY

## 2021-08-26 PROCEDURE — 99215 OFFICE O/P EST HI 40 MIN: CPT | Mod: S$PBB,,, | Performed by: CLINICAL NURSE SPECIALIST

## 2021-08-26 PROCEDURE — 72146 MRI THORACIC SPINE WITHOUT CONTRAST: ICD-10-PCS | Mod: 26,,, | Performed by: RADIOLOGY

## 2021-08-26 PROCEDURE — 72141 MRI NECK SPINE W/O DYE: CPT | Mod: TC

## 2021-08-26 PROCEDURE — 70551 MRI BRAIN DEMYELINATING WITHOUT CONTRAST: ICD-10-PCS | Mod: 26,,, | Performed by: RADIOLOGY

## 2021-08-26 PROCEDURE — 99215 PR OFFICE/OUTPT VISIT, EST, LEVL V, 40-54 MIN: ICD-10-PCS | Mod: S$PBB,,, | Performed by: CLINICAL NURSE SPECIALIST

## 2021-08-26 PROCEDURE — 72141 MRI CERVICAL SPINE DEMYELINATING WITHOUT CONTRAST: ICD-10-PCS | Mod: 26,,, | Performed by: RADIOLOGY

## 2021-08-26 PROCEDURE — 72146 MRI CHEST SPINE W/O DYE: CPT | Mod: TC

## 2021-08-26 PROCEDURE — 99213 OFFICE O/P EST LOW 20 MIN: CPT | Mod: PBBFAC,25 | Performed by: CLINICAL NURSE SPECIALIST

## 2021-08-26 PROCEDURE — 70551 MRI BRAIN STEM W/O DYE: CPT | Mod: TC

## 2021-08-26 PROCEDURE — 99999 PR PBB SHADOW E&M-EST. PATIENT-LVL III: ICD-10-PCS | Mod: PBBFAC,,, | Performed by: CLINICAL NURSE SPECIALIST

## 2021-08-26 PROCEDURE — 72146 MRI CHEST SPINE W/O DYE: CPT | Mod: 26,,, | Performed by: RADIOLOGY

## 2021-08-26 PROCEDURE — 72141 MRI NECK SPINE W/O DYE: CPT | Mod: 26,,, | Performed by: RADIOLOGY

## 2021-08-26 PROCEDURE — 99999 PR PBB SHADOW E&M-EST. PATIENT-LVL III: CPT | Mod: PBBFAC,,, | Performed by: CLINICAL NURSE SPECIALIST

## 2021-09-01 ENCOUNTER — PATIENT MESSAGE (OUTPATIENT)
Dept: PSYCHIATRY | Facility: CLINIC | Age: 41
End: 2021-09-01

## 2021-09-02 ENCOUNTER — PATIENT MESSAGE (OUTPATIENT)
Dept: PSYCHIATRY | Facility: CLINIC | Age: 41
End: 2021-09-02

## 2021-09-04 ENCOUNTER — PATIENT MESSAGE (OUTPATIENT)
Dept: NEUROLOGY | Facility: CLINIC | Age: 41
End: 2021-09-04

## 2021-09-10 ENCOUNTER — DOCUMENTATION ONLY (OUTPATIENT)
Dept: NEUROLOGY | Facility: CLINIC | Age: 41
End: 2021-09-10

## 2021-11-14 ENCOUNTER — TELEPHONE (OUTPATIENT)
Dept: NEUROLOGY | Facility: CLINIC | Age: 41
End: 2021-11-14

## 2021-11-15 NOTE — TELEPHONE ENCOUNTER
Spoke with patient and Mother and they are not surer if they still have hard copy of lab orders. They will check and respond via [employer] regarding lab orders/

## 2021-11-30 ENCOUNTER — TELEPHONE (OUTPATIENT)
Dept: NEUROLOGY | Facility: CLINIC | Age: 41
End: 2021-11-30
Payer: MEDICAID

## 2021-11-30 DIAGNOSIS — E55.9 VITAMIN D DEFICIENCY: Primary | ICD-10-CM

## 2021-11-30 RX ORDER — ASPIRIN 325 MG
50000 TABLET, DELAYED RELEASE (ENTERIC COATED) ORAL WEEKLY
Qty: 12 CAPSULE | Refills: 3 | Status: SHIPPED | OUTPATIENT
Start: 2021-11-30 | End: 2023-08-07

## 2021-12-01 NOTE — TELEPHONE ENCOUNTER
Hey! Did you speak to this patient to let her know her results are ok? Would you like me to call her to let her know she's cleared for her treatment?

## 2021-12-02 NOTE — TELEPHONE ENCOUNTER
Spoke with pt and her mom. She is aware Dr. Gonzalez has reviewed her labs and she is cleared for her infusion on 12/3. Again, advised she established care with a pcp to treat her anemia. Lastly, pt has not been taking vitamin D, but is now aware Dr. Gonzalez has sent in a prescription for her to take Vitamin D3 50,000 units weekly.

## 2021-12-07 ENCOUNTER — PATIENT MESSAGE (OUTPATIENT)
Dept: NEUROLOGY | Facility: CLINIC | Age: 41
End: 2021-12-07
Payer: MEDICAID

## 2021-12-13 ENCOUNTER — TELEPHONE (OUTPATIENT)
Dept: NEUROLOGY | Facility: CLINIC | Age: 41
End: 2021-12-13
Payer: MEDICAID

## 2021-12-13 ENCOUNTER — DOCUMENTATION ONLY (OUTPATIENT)
Dept: NEUROLOGY | Facility: CLINIC | Age: 41
End: 2021-12-13
Payer: MEDICAID

## 2021-12-17 ENCOUNTER — TELEPHONE (OUTPATIENT)
Dept: NEUROLOGY | Facility: CLINIC | Age: 41
End: 2021-12-17

## 2021-12-17 ENCOUNTER — PATIENT MESSAGE (OUTPATIENT)
Dept: NEUROLOGY | Facility: CLINIC | Age: 41
End: 2021-12-17
Payer: MEDICAID

## 2021-12-17 DIAGNOSIS — G35 MULTIPLE SCLEROSIS: Primary | ICD-10-CM

## 2021-12-17 NOTE — TELEPHONE ENCOUNTER
----- Message from Casi Oliveira sent at 12/17/2021  2:03 PM CST -----  Regarding: MEDICATION  Contact: Suzanne briones/ [facility]/ [facility] stated pt is at one of their infusion centers now and was not able to get an IV started after 5 attempts need to know what to do Pt's infusion medication is:     ocrelizumab (OCREVUS) 30 mg/mL Soln      Contact info   [phone] Direct line

## 2021-12-20 ENCOUNTER — DOCUMENTATION ONLY (OUTPATIENT)
Dept: NEUROLOGY | Facility: CLINIC | Age: 41
End: 2021-12-20
Payer: MEDICAID

## 2021-12-21 ENCOUNTER — PATIENT MESSAGE (OUTPATIENT)
Dept: NEUROLOGY | Facility: CLINIC | Age: 41
End: 2021-12-21
Payer: MEDICAID

## 2022-01-07 NOTE — TELEPHONE ENCOUNTER
Spoke with patient and her Mother and scheduled her infusion on 1/19/22 @ 7:00 am per their request.

## 2022-01-14 RX ORDER — ACETAMINOPHEN 500 MG
1000 TABLET ORAL
Status: CANCELLED | OUTPATIENT
Start: 2022-01-14

## 2022-01-14 RX ORDER — EPINEPHRINE 0.3 MG/.3ML
0.3 INJECTION SUBCUTANEOUS
Status: CANCELLED | OUTPATIENT
Start: 2022-01-14

## 2022-01-14 RX ORDER — FAMOTIDINE 10 MG/ML
20 INJECTION INTRAVENOUS
Status: CANCELLED | OUTPATIENT
Start: 2022-01-14

## 2022-01-14 RX ORDER — SODIUM CHLORIDE 0.9 % (FLUSH) 0.9 %
10 SYRINGE (ML) INJECTION
Status: CANCELLED | OUTPATIENT
Start: 2022-01-14

## 2022-01-14 RX ORDER — HEPARIN 100 UNIT/ML
500 SYRINGE INTRAVENOUS
Status: CANCELLED | OUTPATIENT
Start: 2022-01-14

## 2022-01-14 RX ORDER — DIPHENHYDRAMINE HYDROCHLORIDE 50 MG/ML
50 INJECTION INTRAMUSCULAR; INTRAVENOUS
Status: CANCELLED | OUTPATIENT
Start: 2022-01-14

## 2022-01-18 ENCOUNTER — PATIENT MESSAGE (OUTPATIENT)
Dept: NEUROLOGY | Facility: CLINIC | Age: 42
End: 2022-01-18
Payer: MEDICAID

## 2022-01-18 NOTE — TELEPHONE ENCOUNTER
----- Message from Adele Reyez sent at 1/18/2022  2:27 PM CST -----  Type: Patient Call Back    Who called: Vicki    What is the request in detail: pt mom states she don't have transportation for the infusion and wants to know can she get it [place] .. Please call mom    Can the clinic reply by [facility]?    Would the patient rather a call back or a response via [facility]? Call    Best call back number:[phone]

## 2022-01-18 NOTE — TELEPHONE ENCOUNTER
Spoke with patient and mom via telephone and offered them to R/S at [facility]. Sent message to Michelle for assistance with scheduling.

## 2022-01-19 ENCOUNTER — INFUSION (OUTPATIENT)
Dept: INFUSION THERAPY | Facility: HOSPITAL | Age: 42
End: 2022-01-19
Payer: MEDICAID

## 2022-01-19 VITALS
WEIGHT: 205.69 LBS | RESPIRATION RATE: 16 BRPM | OXYGEN SATURATION: 98 % | BODY MASS INDEX: 38.86 KG/M2 | SYSTOLIC BLOOD PRESSURE: 112 MMHG | DIASTOLIC BLOOD PRESSURE: 72 MMHG | HEART RATE: 92 BPM | TEMPERATURE: 97 F

## 2022-01-19 DIAGNOSIS — G35 MS (MULTIPLE SCLEROSIS): Primary | ICD-10-CM

## 2022-01-19 PROCEDURE — 96366 THER/PROPH/DIAG IV INF ADDON: CPT

## 2022-01-19 PROCEDURE — 96375 TX/PRO/DX INJ NEW DRUG ADDON: CPT

## 2022-01-19 PROCEDURE — 96365 THER/PROPH/DIAG IV INF INIT: CPT

## 2022-01-19 PROCEDURE — 25000003 PHARM REV CODE 250: Performed by: PSYCHIATRY & NEUROLOGY

## 2022-01-19 PROCEDURE — 96367 TX/PROPH/DG ADDL SEQ IV INF: CPT

## 2022-01-19 PROCEDURE — 63600175 PHARM REV CODE 636 W HCPCS: Mod: JG | Performed by: PSYCHIATRY & NEUROLOGY

## 2022-01-19 RX ORDER — ACETAMINOPHEN 500 MG
1000 TABLET ORAL
Status: COMPLETED | OUTPATIENT
Start: 2022-01-19 | End: 2022-01-19

## 2022-01-19 RX ORDER — DIPHENHYDRAMINE HYDROCHLORIDE 50 MG/ML
50 INJECTION INTRAMUSCULAR; INTRAVENOUS
Status: CANCELLED | OUTPATIENT
Start: 2022-07-06

## 2022-01-19 RX ORDER — METHYLPREDNISOLONE SOD SUCC 125 MG
100 VIAL (EA) INJECTION
Status: COMPLETED | OUTPATIENT
Start: 2022-01-19 | End: 2022-01-19

## 2022-01-19 RX ORDER — FAMOTIDINE 10 MG/ML
20 INJECTION INTRAVENOUS
Status: COMPLETED | OUTPATIENT
Start: 2022-01-19 | End: 2022-01-19

## 2022-01-19 RX ORDER — FAMOTIDINE 10 MG/ML
20 INJECTION INTRAVENOUS
Status: CANCELLED | OUTPATIENT
Start: 2022-07-06

## 2022-01-19 RX ORDER — SODIUM CHLORIDE 0.9 % (FLUSH) 0.9 %
10 SYRINGE (ML) INJECTION
Status: CANCELLED | OUTPATIENT
Start: 2022-07-06

## 2022-01-19 RX ORDER — HEPARIN 100 UNIT/ML
500 SYRINGE INTRAVENOUS
Status: CANCELLED | OUTPATIENT
Start: 2022-07-06

## 2022-01-19 RX ORDER — ACETAMINOPHEN 500 MG
1000 TABLET ORAL
Status: CANCELLED | OUTPATIENT
Start: 2022-07-06

## 2022-01-19 RX ORDER — EPINEPHRINE 0.3 MG/.3ML
0.3 INJECTION SUBCUTANEOUS
Status: CANCELLED | OUTPATIENT
Start: 2022-07-06

## 2022-01-19 RX ORDER — SODIUM CHLORIDE 0.9 % (FLUSH) 0.9 %
10 SYRINGE (ML) INJECTION
Status: DISCONTINUED | OUTPATIENT
Start: 2022-01-19 | End: 2022-01-19 | Stop reason: HOSPADM

## 2022-01-19 RX ORDER — METHYLPREDNISOLONE SOD SUCC 125 MG
100 VIAL (EA) INJECTION
Status: CANCELLED
Start: 2022-07-06

## 2022-01-19 RX ADMIN — ACETAMINOPHEN 1000 MG: 500 TABLET ORAL at 09:01

## 2022-01-19 RX ADMIN — METHYLPREDNISOLONE SODIUM SUCCINATE 100 MG: 125 INJECTION, POWDER, FOR SOLUTION INTRAMUSCULAR; INTRAVENOUS at 09:01

## 2022-01-19 RX ADMIN — OCRELIZUMAB 600 MG: 300 INJECTION INTRAVENOUS at 09:01

## 2022-01-19 RX ADMIN — DIPHENHYDRAMINE HYDROCHLORIDE 50 MG: 50 INJECTION, SOLUTION INTRAMUSCULAR; INTRAVENOUS at 09:01

## 2022-01-19 RX ADMIN — SODIUM CHLORIDE: 9 INJECTION, SOLUTION INTRAVENOUS at 09:01

## 2022-01-19 RX ADMIN — FAMOTIDINE 20 MG: 10 INJECTION INTRAVENOUS at 09:01

## 2022-01-19 NOTE — DISCHARGE INSTRUCTIONS
FALL PREVENTION   Falls often occur due to slipping, tripping or losing your balance. Here are ways to reduce your risk of falling again.   Was there anything that caused your fall that can be fixed, removed or replaced?   Make your home safe by keeping walkways clear of objects you may trip over.   Use non-slip pads under rugs.   Do not walk in poorly lit areas.   Do not stand on chairs or wobbly ladders.   Use caution when reaching overhead or looking upward. This position can cause a loss of balance.   Be sure your shoes fit properly, have non-slip bottoms and are in good condition.   Be cautious when going up and down stairs, curbs, and when walking on uneven sidewalks.   If your balance is poor, consider using a cane or walker.   If your fall was related to alcohol use, stop or limit alcohol intake.   If your fall was related to use of sleeping medicines, talk to your doctor about this. You may need to reduce your dosage at bedtime if you awaken during the night to go to the bathroom.   To reduce the need for nighttime bathroom trips:   Avoid drinking fluids for several hours before going to bed   Empty your bladder before going to bed   Men can keep a urinal at the bedside   © 9615-1699 [facility], [address] [place] [address], [place] [id number]. All rights reserved. This information is not intended as a substitute for professional medical care. Always follow your healthcare professional's instructions.  WAYS TO HELP PREVENT INFECTION         WASH YOUR HANDS OFTEN DURING THE DAY, ESPECIALLY BEFORE YOU EAT, AFTER USING THE BATHROOM, AND AFTER TOUCHING ANIMALS     STAY AWAY FROM PEOPLE WHO HAVE ILLNESSES YOU CAN CATCH; SUCH AS COLDS, FLU, CHICKEN POX     TRY TO AVOID CROWDS     STAY AWAY FROM CHILDREN WHO RECENTLY HAVE RECEIVED LIVE VIRUS VACCINES     MAINTAIN GOOD MOUTH CARE     DO NOT SQUEEZE OR SCRATCH PIMPLES     CLEAN CUTS & SCRAPES RIGHT AWAY AND DAILY UNTIL HEALED WITH WARM WATER, SOAP & AN  ANTISEPTIC     AVOID CONTACT WITH LITTER BOXES, BIRD CAGES, & FISH TANKS     AVOID STANDING WATER, IE., BIRD BATHS, FLOWER POTS/VASES, OR HUMIDIFIERS     WEAR GLOVES WHEN GARDENING OR CLEANING UP AFTER OTHERS, ESPECIALLY BABIES & SMALL CHILDREN     DO NOT EAT RAW FISH, SEAFOOD, MEAT, OR EGGS

## 2022-01-19 NOTE — NURSING
Pt here for maintenance Ocrevus today. Previous infusions have been done through [facility]. She tolerated treatment well with mother at chairside. Pt remained in infusion suite one hour post infusion with no adverse reactions. Pt ambulatory from clinic.

## 2022-01-19 NOTE — PLAN OF CARE
Plan of care reviewed with pt. And mother. All needs and concerns addressed.   Problem: Adult Inpatient Plan of Care  Goal: Plan of Care Review  Outcome: Ongoing, Progressing  Flowsheets (Taken 1/19/2022 1424)  Plan of Care Reviewed With: patient  Goal: Patient-Specific Goal (Individualized)  Outcome: Ongoing, Progressing  Flowsheets (Taken 1/19/2022 1424)  Anxieties, Fears or Concerns: Pt and mother concerned about whether or not we will be able to obtain piv today- pt has had issues in the past not getting treatment due to inablity to gain iv access.  Individualized Care Needs: Feet elevated in recliner, warm blankets, pillow, snack and juice provided.  Patient-Specific Goals (Include Timeframe): Tolerate treatment.  Goal: Optimal Comfort and Wellbeing  Outcome: Ongoing, Progressing  Intervention: Provide Person-Centered Care  Flowsheets (Taken 1/19/2022 1424)  Trust Relationship/Rapport:   care explained   questions encouraged   choices provided   reassurance provided   emotional support provided   thoughts/feelings acknowledged   empathic listening provided   questions answered     Problem: Fall Injury Risk  Goal: Absence of Fall and Fall-Related Injury  Outcome: Ongoing, Progressing  Intervention: Promote Injury-Free Environment  Flowsheets (Taken 1/19/2022 1424)  Safety Promotion/Fall Prevention:   assistive device/personal item within reach   in recliner, wheels locked   room near unit station   instructed to call staff for mobility

## 2022-02-28 ENCOUNTER — PATIENT MESSAGE (OUTPATIENT)
Dept: PSYCHIATRY | Facility: CLINIC | Age: 42
End: 2022-02-28
Payer: MEDICAID

## 2022-05-27 ENCOUNTER — DOCUMENTATION ONLY (OUTPATIENT)
Dept: NEUROLOGY | Facility: CLINIC | Age: 42
End: 2022-05-27
Payer: MEDICAID

## 2022-06-20 ENCOUNTER — TELEPHONE (OUTPATIENT)
Dept: NEUROLOGY | Facility: CLINIC | Age: 42
End: 2022-06-20
Payer: MEDICAID

## 2022-06-20 DIAGNOSIS — G35 MULTIPLE SCLEROSIS: Primary | ICD-10-CM

## 2022-06-21 ENCOUNTER — LAB VISIT (OUTPATIENT)
Dept: LAB | Facility: HOSPITAL | Age: 42
End: 2022-06-21
Attending: CLINICAL NURSE SPECIALIST
Payer: MEDICAID

## 2022-06-21 DIAGNOSIS — G35 MULTIPLE SCLEROSIS: ICD-10-CM

## 2022-06-21 LAB
BASOPHILS # BLD AUTO: 0.01 X10(3)/MCL (ref 0–0.2)
BASOPHILS NFR BLD AUTO: 0.2 %
EOSINOPHIL # BLD AUTO: 0.14 X10(3)/MCL (ref 0–0.9)
EOSINOPHIL NFR BLD AUTO: 2.2 %
ERYTHROCYTE [DISTWIDTH] IN BLOOD BY AUTOMATED COUNT: 14.9 % (ref 11.5–17)
HBV CORE AB SERPL QL IA: NONREACTIVE
HBV SURFACE AB SER-ACNC: 16.33 MIU/ML
HBV SURFACE AB SERPL IA-ACNC: REACTIVE M[IU]/ML
HBV SURFACE AG SERPL QL IA: NONREACTIVE
HCT VFR BLD AUTO: 30.6 % (ref 37–47)
HGB BLD-MCNC: 9.5 GM/DL (ref 12–16)
IGA SERPL-MCNC: 1217 MG/DL (ref 65–421)
IGG SERPL-MCNC: 709 MG/DL (ref 522–1631)
IGM SERPL-MCNC: 59 MG/DL (ref 33–293)
IMM GRANULOCYTES # BLD AUTO: 0.03 X10(3)/MCL (ref 0–0.02)
IMM GRANULOCYTES NFR BLD AUTO: 0.5 % (ref 0–0.43)
LYMPHOCYTES # BLD AUTO: 1.19 X10(3)/MCL (ref 0.6–4.6)
LYMPHOCYTES NFR BLD AUTO: 19 %
MCH RBC QN AUTO: 26.6 PG (ref 27–31)
MCHC RBC AUTO-ENTMCNC: 31 MG/DL (ref 33–36)
MCV RBC AUTO: 85.7 FL (ref 80–94)
MONOCYTES # BLD AUTO: 0.57 X10(3)/MCL (ref 0.1–1.3)
MONOCYTES NFR BLD AUTO: 9.1 %
NEUTROPHILS # BLD AUTO: 4.3 X10(3)/MCL (ref 2.1–9.2)
NEUTROPHILS NFR BLD AUTO: 69 %
NRBC BLD AUTO-RTO: 0 %
PLATELET # BLD AUTO: 340 X10(3)/MCL (ref 130–400)
PMV BLD AUTO: 10.8 FL (ref 9.4–12.4)
RBC # BLD AUTO: 3.57 X10(6)/MCL (ref 4.2–5.4)
WBC # SPEC AUTO: 6.3 X10(3)/MCL (ref 4.5–11.5)

## 2022-06-21 PROCEDURE — 36415 COLL VENOUS BLD VENIPUNCTURE: CPT

## 2022-06-21 PROCEDURE — 86706 HEP B SURFACE ANTIBODY: CPT

## 2022-06-21 PROCEDURE — 86704 HEP B CORE ANTIBODY TOTAL: CPT

## 2022-06-21 PROCEDURE — 85025 COMPLETE CBC W/AUTO DIFF WBC: CPT

## 2022-06-21 PROCEDURE — 82784 ASSAY IGA/IGD/IGG/IGM EACH: CPT

## 2022-06-21 PROCEDURE — 87340 HEPATITIS B SURFACE AG IA: CPT

## 2022-06-24 ENCOUNTER — PATIENT MESSAGE (OUTPATIENT)
Dept: PSYCHIATRY | Facility: CLINIC | Age: 42
End: 2022-06-24
Payer: MEDICAID

## 2022-07-06 NOTE — TELEPHONE ENCOUNTER
Hep B Core Ab negative, Surface Ab positive, Surface Ag negative (past vaccination)  High IgA   WBC=6.3; VTX=7986    Ok to proceed with Ocrevus infusion.

## 2022-07-13 NOTE — TELEPHONE ENCOUNTER
Pt's mom is aware [facility] will reach out to schedule Bettye's Ocrevus around 7/18. Sent message to [facility] infusion center.

## 2022-07-16 NOTE — PROGRESS NOTES
Subjective:          Patient ID: Bettye Ch is a 41 y.o. female who presents today for a routine virtual visit for MS. She was last seen in August 2021. The history has been provided by the patient.     The patient location is: her home   The chief complaint leading to consultation is: MS     Visit type: audiovisual    Face to Face time with patient: 30 minutes  40 minutes of total time spent on the encounter, which includes face to face time and non-face to face time preparing to see the patient (eg, review of tests), Obtaining and/or reviewing separately obtained history, Documenting clinical information in the electronic or other health record, Independently interpreting results (not separately reported) and communicating results to the patient/family/caregiver, or Care coordination (not separately reported).     Each patient to whom he or she provides medical services by telemedicine is:  (1) informed of the relationship between the physician and patient and the respective role of any other health care provider with respect to management of the patient; and (2) notified that he or she may decline to receive medical services by telemedicine and may withdraw from such care at any time.    MS HPI:  · DMT: ocrelizumab last infused in January; due this month and scheduled for 7/28  · Side effects from DMT? No  · Taking vitamin D3 as recommended? Not at the moment   · She denies any new or different symptoms.   · She denies any signs of relapse.   · She had been walking for exercise, but has not done this lately because it has been hot or rainy. She generally stays inside.   · She denies any recent infections.   · She is considering getting some dental implants and is seeing a prostodontist.     Medications:  Current Outpatient Medications   Medication Sig    acetaminophen (TYLENOL) 325 MG tablet Take 325 mg by mouth every 6 (six) hours as needed for Pain.    CALCIUM CARBONATE/VITAMIN D3 (VITAMIN D-3  ORAL) Take 5,000 Units by mouth once daily.     cholecalciferol, vitamin D3, 1,250 mcg (50,000 unit) capsule Take 1 capsule (50,000 Units total) by mouth once a week.    ocrelizumab (OCREVUS) 30 mg/mL Soln Infuse Ocrevus 600mg in 500mL of 0.9% NaCl IV every 24 weeks. Pre-meds: Solumedrol 100mg IVPB, Benadryl 50mg IVP, Tylenol 1000mg PO, Pepcid 20mg IVP. See attached protocol for dosing rate and infusion duration.       SOCIAL HISTORY  Social History     Tobacco Use    Smoking status: Never Smoker    Smokeless tobacco: Never Used   Substance Use Topics    Alcohol use: No    Drug use: No       Living arrangements - the patient lives with their family.    ROS:    REVIEW OF SYMPTOMS 7/18/22    Do you feel abnormally tired on most days? No   Do you feel you generally sleep well? Yes--she feels like she gets enough sleep    Do you have difficulty controlling your bladder?  No--she feels like she is emptying completely; no incontinence or UTIs   Do you have difficulty controlling your bowels?  No--fairly regular    Do you have frequent muscle cramps, tightness or spasms in your limbs?  No   Do you have new visual symptoms?  No   Do you have worsening difficulty with your memory or thinking? No--stable    Do you have worsening symptoms of anxiety or depression?  No   For patients who walk, Do you have more difficulty walking?  No--stable    Have you fallen since your last visit?  No   For patients who use wheelchairs: Do you have any skin wounds or breakdown? Not Applicable   Do you have difficulty using your hands?  No; she is right-handed    Do you have shooting or burning pain? No   Do you often choke when swallowing liquids or solid food?  No   Do you experience worsening symptoms when overheated? No   Do you need any new equipment such as a wheelchair, walker or shower chair? No   Do you receive co-pay financial assistance for your principal MS medicine? Yes   Would you be interested in participating in an MS  research trial in the future? No   For patients on Gilenya, Tecfidera, Aubagio, Rituxan, Ocrevus, Tysabri, Lemtrada or Methotrexate, are you aware that you should NOT receive live virus vaccines?  Yes   Do you feel you have adequate family/friend support?  Yes   Do you have health insurance?   Yes   Are you currently employed? No   Do you receive SSDI/SSI?  No--has been denied for SSI    Do you use marijuana or cannabis products? No   Have you been diagnosed with a urinary tract infection since your last visit here? No   Have you been diagnosed with a respiratory tract infection since your last visit here? No   Have you been to the emergency room since your last visit here? No   Have you been hospitalized since your last visit here?  No            Objective:        1. 25 foot timed walk:  Timed 25 Foot Walk: 8/11/2020 8/26/2021   Did patient wear an AFO? No No   Was assistive device used? No No   Time for 25 Foot Walk (seconds) 6.1 6.1   Time for 25 Foot Walk (seconds) 6.2 6.3       Neurologic Exam    Deferred   Imaging:     Results for orders placed during the hospital encounter of 08/26/21    MRI Brain Demyelinating Without Contrast    Impression  No significant change from prior with continued patchy and confluent foci of T2 FLAIR signal hyperintensity throughout the brain parenchyma while nonspecific remain concerning for moderate to severe degree of prior demyelinating plaque burden.    No definite new lesion to suggest significant interval or active demyelination.    Stable prominence of the pituitary within the sella cannot exclude underlying adenoma.    Clinical correlation and follow-up advised.      Electronically signed by: Ben Valdovinos DO  Date:    08/26/2021  Time:    10:30    Results for orders placed during the hospital encounter of 08/26/21    MRI Cervical Spine Demyelinating Without Contrast    Impression  Continued scattered T2 stir signal lesions throughout the cervical and to lesser degree  thoracic spinal cord overall somewhat less conspicuous from prior remain concerning for prior areas of demyelination in light of history.  Please note component of cervical spine is somewhat long segment and may be multifocal confluent regions or large lesion overall similar to prior    No evidence for new cord signal abnormality to suggest new lesion.    Clinical correlation and follow-up advised    .      Electronically signed by: Ben Valdovinos DO  Date:    08/26/2021  Time:    10:39    Labs:     Lab Results   Component Value Date    VSMYSDTF59YT 27 (L) 09/19/2019    RMLJROCR26NQ 60 03/20/2019    FLIGVWAJ62TG 28 (L) 09/17/2018     Lab Results   Component Value Date    JCVINDEX SEE COMMENT (A) 03/24/2015    JCVANTIBODY SEE COMMENT 03/24/2015     Lab Results   Component Value Date    YI9EOIUM 72.6 03/15/2017    ABSOLUTECD3 1472 03/15/2017    OW1INVIM 18.1 03/15/2017    ABSOLUTECD8 367 03/15/2017    IZ5YHELK 52.4 03/15/2017    ABSOLUTECD4 1062 03/15/2017    LABCD48 2.89 03/15/2017     Lab Results   Component Value Date    WBC 6.3 06/21/2022    RBC 3.57 (L) 06/21/2022    HGB 9.5 (L) 06/21/2022    HCT 30.6 (L) 06/21/2022    MCV 85.7 06/21/2022    MCH 26.6 (L) 06/21/2022    MCHC 31.0 (L) 06/21/2022    RDW 14.9 06/21/2022     06/21/2022    MPV 10.8 06/21/2022    GRAN 3.3 09/19/2019    GRAN 61.8 09/19/2019    LYMPH 1.1 09/19/2019    LYMPH 21.1 09/19/2019    MONO 0.7 09/19/2019    MONO 12.5 09/19/2019    EOS 0.2 09/19/2019    BASO 0.04 09/19/2019    EOSINOPHIL 3.6 09/19/2019    BASOPHIL 0.8 09/19/2019     Sodium   Date Value Ref Range Status   09/17/2018 139 136 - 145 mmol/L Final     Potassium   Date Value Ref Range Status   09/17/2018 4.7 3.5 - 5.1 mmol/L Final     Chloride   Date Value Ref Range Status   09/17/2018 105 95 - 110 mmol/L Final     CO2   Date Value Ref Range Status   09/17/2018 25 23 - 29 mmol/L Final     Glucose   Date Value Ref Range Status   09/17/2018 82 70 - 110 mg/dL Final     BUN   Date Value  Ref Range Status   09/17/2018 10 6 - 20 mg/dL Final     Creatinine   Date Value Ref Range Status   09/17/2018 0.6 0.5 - 1.4 mg/dL Final     Calcium   Date Value Ref Range Status   09/17/2018 9.9 8.7 - 10.5 mg/dL Final     Total Protein   Date Value Ref Range Status   09/17/2018 8.0 6.0 - 8.4 g/dL Final     Albumin   Date Value Ref Range Status   09/17/2018 4.0 3.5 - 5.2 g/dL Final     Total Bilirubin   Date Value Ref Range Status   09/17/2018 0.5 0.1 - 1.0 mg/dL Final     Comment:     For infants and newborns, interpretation of results should be based  on gestational age, weight and in agreement with clinical  observations.  Premature Infant recommended reference ranges:  Up to 24 hours.............<8.0 mg/dL  Up to 48 hours............<12.0 mg/dL  3-5 days..................<15.0 mg/dL  6-29 days.................<15.0 mg/dL       Alkaline Phosphatase   Date Value Ref Range Status   09/17/2018 90 55 - 135 U/L Final     AST   Date Value Ref Range Status   09/17/2018 12 10 - 40 U/L Final     ALT   Date Value Ref Range Status   09/17/2018 8 (L) 10 - 44 U/L Final     Anion Gap   Date Value Ref Range Status   09/17/2018 9 8 - 16 mmol/L Final     eGFR if    Date Value Ref Range Status   09/17/2018 >60.0 >60 mL/min/1.73 m^2 Final     eGFR if non    Date Value Ref Range Status   09/17/2018 >60.0 >60 mL/min/1.73 m^2 Final     Comment:     Calculation used to obtain the estimated glomerular filtration  rate (eGFR) is the CKD-EPI equation.        Lab Results   Component Value Date    HEPBSAG Negative 09/19/2019    HEPBSAB Reactive (A) 06/21/2022    HEPBCAB Nonreactive 06/21/2022     MS Impression and Plan:     NEURO MULTIPLE SCLEROSIS IMPRESSION:   MS Status:     Number of relapses in the past year?:  0    Clinical Progression:  Clinically Stable    MRI Progression:  Stable  Plan:     DMT:  No change in management    DMT comment:  Ciontinue Ocrevus and Vitamin D. Her next infusion is due later  this month. Labs have been reviewed. She is aware of the risks associated with immunosuppressant therapy, including increased risk of infection.   Discussed Evusheld. Fact sheet provided to patient.        MRI is planned for August at [facility].   She will follow up with Dr. Gonzalez in 6 months.   Referral placed to hematology in light of elevated IgA.       LAMAR Orellana, CNS  Problem List Items Addressed This Visit     Prophylactic immunotherapy      Other Visit Diagnoses     Multiple sclerosis    -  Primary    Relevant Orders    MRI Brain Demyelinating Without Contrast    Elevated immunoglobulin A        Relevant Orders    Ambulatory referral/consult to Hematology / Oncology    Counseling regarding goals of care        High risk medication use

## 2022-07-18 ENCOUNTER — OFFICE VISIT (OUTPATIENT)
Dept: NEUROLOGY | Facility: CLINIC | Age: 42
End: 2022-07-18
Payer: MEDICAID

## 2022-07-18 DIAGNOSIS — G35 MULTIPLE SCLEROSIS: Primary | ICD-10-CM

## 2022-07-18 DIAGNOSIS — Z71.89 COUNSELING REGARDING GOALS OF CARE: ICD-10-CM

## 2022-07-18 DIAGNOSIS — Z79.899 HIGH RISK MEDICATION USE: ICD-10-CM

## 2022-07-18 DIAGNOSIS — R76.8 ELEVATED IMMUNOGLOBULIN A: ICD-10-CM

## 2022-07-18 DIAGNOSIS — Z29.89 PROPHYLACTIC IMMUNOTHERAPY: ICD-10-CM

## 2022-07-18 PROCEDURE — 1159F MED LIST DOCD IN RCRD: CPT | Mod: CPTII,95,, | Performed by: CLINICAL NURSE SPECIALIST

## 2022-07-18 PROCEDURE — 99214 PR OFFICE/OUTPT VISIT, EST, LEVL IV, 30-39 MIN: ICD-10-PCS | Mod: 95,,, | Performed by: CLINICAL NURSE SPECIALIST

## 2022-07-18 PROCEDURE — 99214 OFFICE O/P EST MOD 30 MIN: CPT | Mod: 95,,, | Performed by: CLINICAL NURSE SPECIALIST

## 2022-07-18 PROCEDURE — 1159F PR MEDICATION LIST DOCUMENTED IN MEDICAL RECORD: ICD-10-PCS | Mod: CPTII,95,, | Performed by: CLINICAL NURSE SPECIALIST

## 2022-07-18 NOTE — Clinical Note
CM, please schedule MRI at [facility] in August; hematology appt from referral, also at [facility].  F/U with BB in 6 months.

## 2022-07-23 ENCOUNTER — PATIENT MESSAGE (OUTPATIENT)
Dept: NEUROLOGY | Facility: CLINIC | Age: 42
End: 2022-07-23
Payer: MEDICAID

## 2022-07-25 ENCOUNTER — TELEPHONE (OUTPATIENT)
Dept: NEUROLOGY | Facility: CLINIC | Age: 42
End: 2022-07-25
Payer: MEDICAID

## 2022-07-25 NOTE — TELEPHONE ENCOUNTER
----- Message from LAMAR Peter, CNS sent at 7/23/2022  6:46 AM CDT -----  CM, please schedule MRI at [facility] in August; hematology appt from referral, also at [facility].   F/U with BB in 6 months.

## 2022-07-27 ENCOUNTER — OFFICE VISIT (OUTPATIENT)
Dept: HEMATOLOGY/ONCOLOGY | Facility: CLINIC | Age: 42
End: 2022-07-27
Payer: MEDICAID

## 2022-07-27 DIAGNOSIS — D64.9 ANEMIA, UNSPECIFIED TYPE: Primary | ICD-10-CM

## 2022-07-27 DIAGNOSIS — R76.8 ELEVATED IMMUNOGLOBULIN A: ICD-10-CM

## 2022-07-27 PROCEDURE — 99204 OFFICE O/P NEW MOD 45 MIN: CPT | Mod: 95,,, | Performed by: INTERNAL MEDICINE

## 2022-07-27 PROCEDURE — 99204 PR OFFICE/OUTPT VISIT, NEW, LEVL IV, 45-59 MIN: ICD-10-PCS | Mod: 95,,, | Performed by: INTERNAL MEDICINE

## 2022-07-27 NOTE — PROGRESS NOTES
The patient location is: home  The chief complaint leading to consultation is: elevated IgA level, anemia    Visit type: audiovisual    Face to Face time with patient: 15   30  minutes of total time spent on the encounter, which includes face to face time and non-face to face time preparing to see the patient (eg, review of tests), Obtaining and/or reviewing separately obtained history, Documenting clinical information in the electronic or other health record, Independently interpreting results (not separately reported) and communicating results to the patient/family/caregiver, or Care coordination (not separately reported).         Each patient to whom he or she provides medical services by telemedicine is:  (1) informed of the relationship between the physician and patient and the respective role of any other health care provider with respect to management of the patient; and (2) notified that he or she may decline to receive medical services by telemedicine and may withdraw from such care at any time.    Notes:     SECTION OF HEMATOLOGY AND BONE MARROW TRANSPLANT  New Patient Visit   07/27/2022  Referred by:  Anju Christopher  Referred for: elevated IgA level, anemia    CHIEF COMPLAINT: No chief complaint on file.      HISTORY OF PRESENT ILLNESS:   42 y.o.female; pmh of RR MS on ocrevus; referred by neurology for elevated IgA level.    Also pt notes long standing history of anemia.  Denies fever, chills, nightsweats, bleeding, brusing, lymphadenopathy, signs/symptoms of splenomegaly, focal pain, SOB, chest pain, neuropathy, fatigue  Mother present with pt today.  PAST MEDICAL HISTORY:   Past Medical History:   Diagnosis Date    Multiple sclerosis        PAST SURGICAL HISTORY:   Past Surgical History:   Procedure Laterality Date    ANTERIOR CRUCIATE LIGAMENT REPAIR  1998    right knee    BRAIN BIOPSY  early 2000's       PAST SOCIAL HISTORY:   reports that she has never smoked. She has never used smokeless  tobacco. She reports that she does not drink alcohol and does not use drugs.    FAMILY HISTORY:  Family History   Problem Relation Age of Onset    Hypertension Mother     Stomach cancer Father     Kidney disease Paternal Grandmother     Diabetes Paternal Grandmother     Heart disease Paternal Grandmother     Colon cancer Paternal Grandfather        CURRENT MEDICATIONS:   Current Outpatient Medications   Medication Sig    acetaminophen (TYLENOL) 325 MG tablet Take 325 mg by mouth every 6 (six) hours as needed for Pain.    CALCIUM CARBONATE/VITAMIN D3 (VITAMIN D-3 ORAL) Take 5,000 Units by mouth once daily.     cholecalciferol, vitamin D3, 1,250 mcg (50,000 unit) capsule Take 1 capsule (50,000 Units total) by mouth once a week. (Patient not taking: Reported on 7/18/2022)    ocrelizumab (OCREVUS) 30 mg/mL Soln Infuse Ocrevus 600mg in 500mL of 0.9% NaCl IV every 24 weeks. Pre-meds: Solumedrol 100mg IVPB, Benadryl 50mg IVP, Tylenol 1000mg PO, Pepcid 20mg IVP. See attached protocol for dosing rate and infusion duration.     No current facility-administered medications for this visit.     ALLERGIES:   Review of patient's allergies indicates:  No Known Allergies          REVIEW OF SYSTEMS:   See HPI  PHYSICAL EXAM:   physical exam deferred due to telemed      ECOG Performance Status: (foot note - ECOG PS provided by Eastern Cooperative Oncology Group) 1 - Symptomatic but completely ambulatory    Karnofsky Performance Score:  80%- Normal Activity with Effort: Some Symptoms of Disease  DATA:   Lab Results   Component Value Date    WBC 6.3 06/21/2022    HGB 9.5 (L) 06/21/2022    HCT 30.6 (L) 06/21/2022    MCV 85.7 06/21/2022     06/21/2022       Gran # (ANC)   Date Value Ref Range Status   09/19/2019 3.3 1.8 - 7.7 K/uL Final     Gran %   Date Value Ref Range Status   09/19/2019 61.8 38.0 - 73.0 % Final     Lymph #   Date Value Ref Range Status   09/19/2019 1.1 1.0 - 4.8 K/uL Final     Lymph %   Date Value Ref  Range Status   09/19/2019 21.1 18.0 - 48.0 % Final     CMP  Sodium   Date Value Ref Range Status   09/17/2018 139 136 - 145 mmol/L Final     Potassium   Date Value Ref Range Status   09/17/2018 4.7 3.5 - 5.1 mmol/L Final     Chloride   Date Value Ref Range Status   09/17/2018 105 95 - 110 mmol/L Final     CO2   Date Value Ref Range Status   09/17/2018 25 23 - 29 mmol/L Final     Glucose   Date Value Ref Range Status   09/17/2018 82 70 - 110 mg/dL Final     BUN   Date Value Ref Range Status   09/17/2018 10 6 - 20 mg/dL Final     Creatinine   Date Value Ref Range Status   09/17/2018 0.6 0.5 - 1.4 mg/dL Final     Calcium   Date Value Ref Range Status   09/17/2018 9.9 8.7 - 10.5 mg/dL Final     Total Protein   Date Value Ref Range Status   09/17/2018 8.0 6.0 - 8.4 g/dL Final     Albumin   Date Value Ref Range Status   09/17/2018 4.0 3.5 - 5.2 g/dL Final     Total Bilirubin   Date Value Ref Range Status   09/17/2018 0.5 0.1 - 1.0 mg/dL Final     Comment:     For infants and newborns, interpretation of results should be based  on gestational age, weight and in agreement with clinical  observations.  Premature Infant recommended reference ranges:  Up to 24 hours.............<8.0 mg/dL  Up to 48 hours............<12.0 mg/dL  3-5 days..................<15.0 mg/dL  6-29 days.................<15.0 mg/dL       Alkaline Phosphatase   Date Value Ref Range Status   09/17/2018 90 55 - 135 U/L Final     AST   Date Value Ref Range Status   09/17/2018 12 10 - 40 U/L Final     ALT   Date Value Ref Range Status   09/17/2018 8 (L) 10 - 44 U/L Final     Anion Gap   Date Value Ref Range Status   09/17/2018 9 8 - 16 mmol/L Final     eGFR if    Date Value Ref Range Status   09/17/2018 >60.0 >60 mL/min/1.73 m^2 Final     eGFR if non    Date Value Ref Range Status   09/17/2018 >60.0 >60 mL/min/1.73 m^2 Final     Comment:     Calculation used to obtain the estimated glomerular filtration  rate (eGFR) is the  CKD-EPI equation.        Lab Results   Component Value Date    TSH 1.066 04/09/2018        Latest Reference Range & Units 06/21/22 10:07   IgG 522.00 - 1,631.00 mg/dL 709.00   IgM 33.0 - 293.0 mg/dL 59.0   IgA 65.0 - 421.0 mg/dL 1,217.0 (H)   (H): Data is abnormally high    ASSESSMENT AND PLAN:   Encounter Diagnoses   Name Primary?    Elevated immunoglobulin A     Anemia, unspecified type Yes     -significantly elevated IgA: reactive vs primary Lymphoproliferative disorder/plasma cell disorder  -will send cbc, cmp, serum free light chains, quantitative immunoglobulins, serum electropheresis, serum immunofixation, beta2 MG to further clarify clonality of IgA  -will send peripheral blood anemia evaluation: nutritional, hemolytic labs  -will see pt virtually in 2 weeks to review labs and if further studies needed    Follow Up: -labs at [facility] lab tomorrow 7/28 afternoon after her scheduled ocrevus infusion there   -please schedule virtual visit with me on 8/10/22 to review lab results    Ozzy Malone MD  Hematology/Oncology/Bone Marrow Transplant

## 2022-07-27 NOTE — Clinical Note
-please link draw all labs I entered today at [facility] tomorrow 7/28 afternoon after her scheduled infusion there -please schedule virtual visit with me on 8/10/22 to review lab results

## 2022-07-28 ENCOUNTER — INFUSION (OUTPATIENT)
Dept: INFUSION THERAPY | Facility: HOSPITAL | Age: 42
End: 2022-07-28
Attending: PSYCHIATRY & NEUROLOGY
Payer: MEDICAID

## 2022-07-28 VITALS
SYSTOLIC BLOOD PRESSURE: 114 MMHG | HEART RATE: 74 BPM | HEIGHT: 60 IN | RESPIRATION RATE: 18 BRPM | BODY MASS INDEX: 40.69 KG/M2 | DIASTOLIC BLOOD PRESSURE: 73 MMHG | TEMPERATURE: 98 F | WEIGHT: 207.25 LBS

## 2022-07-28 DIAGNOSIS — G35 MS (MULTIPLE SCLEROSIS): Primary | ICD-10-CM

## 2022-07-28 PROCEDURE — 96375 TX/PRO/DX INJ NEW DRUG ADDON: CPT

## 2022-07-28 PROCEDURE — 96366 THER/PROPH/DIAG IV INF ADDON: CPT

## 2022-07-28 PROCEDURE — 96365 THER/PROPH/DIAG IV INF INIT: CPT

## 2022-07-28 PROCEDURE — 63600175 PHARM REV CODE 636 W HCPCS: Performed by: PSYCHIATRY & NEUROLOGY

## 2022-07-28 PROCEDURE — 25000003 PHARM REV CODE 250: Performed by: PSYCHIATRY & NEUROLOGY

## 2022-07-28 RX ORDER — FAMOTIDINE 10 MG/ML
20 INJECTION INTRAVENOUS
Status: COMPLETED | OUTPATIENT
Start: 2022-07-28 | End: 2022-07-28

## 2022-07-28 RX ORDER — ACETAMINOPHEN 500 MG
1000 TABLET ORAL
Status: COMPLETED | OUTPATIENT
Start: 2022-07-28 | End: 2022-07-28

## 2022-07-28 RX ORDER — HEPARIN 100 UNIT/ML
500 SYRINGE INTRAVENOUS
Status: DISCONTINUED | OUTPATIENT
Start: 2022-07-28 | End: 2022-07-28 | Stop reason: HOSPADM

## 2022-07-28 RX ORDER — EPINEPHRINE 0.3 MG/.3ML
0.3 INJECTION SUBCUTANEOUS
Status: DISCONTINUED | OUTPATIENT
Start: 2022-07-28 | End: 2022-07-28 | Stop reason: HOSPADM

## 2022-07-28 RX ORDER — METHYLPREDNISOLONE SOD SUCC 125 MG
100 VIAL (EA) INJECTION
Status: CANCELLED
Start: 2022-12-22

## 2022-07-28 RX ORDER — SODIUM CHLORIDE 0.9 % (FLUSH) 0.9 %
10 SYRINGE (ML) INJECTION
Status: CANCELLED | OUTPATIENT
Start: 2022-12-22

## 2022-07-28 RX ORDER — DIPHENHYDRAMINE HYDROCHLORIDE 50 MG/ML
50 INJECTION INTRAMUSCULAR; INTRAVENOUS
Status: DISCONTINUED | OUTPATIENT
Start: 2022-07-28 | End: 2022-07-28 | Stop reason: HOSPADM

## 2022-07-28 RX ORDER — METHYLPREDNISOLONE SOD SUCC 125 MG
100 VIAL (EA) INJECTION
Status: COMPLETED | OUTPATIENT
Start: 2022-07-28 | End: 2022-07-28

## 2022-07-28 RX ORDER — ACETAMINOPHEN 500 MG
1000 TABLET ORAL
Status: CANCELLED | OUTPATIENT
Start: 2022-12-22

## 2022-07-28 RX ORDER — EPINEPHRINE 0.3 MG/.3ML
0.3 INJECTION SUBCUTANEOUS
Status: CANCELLED | OUTPATIENT
Start: 2022-12-22

## 2022-07-28 RX ORDER — DIPHENHYDRAMINE HYDROCHLORIDE 50 MG/ML
50 INJECTION INTRAMUSCULAR; INTRAVENOUS
Status: CANCELLED | OUTPATIENT
Start: 2022-12-22

## 2022-07-28 RX ORDER — FAMOTIDINE 10 MG/ML
20 INJECTION INTRAVENOUS
Status: CANCELLED | OUTPATIENT
Start: 2022-12-22

## 2022-07-28 RX ORDER — HEPARIN 100 UNIT/ML
500 SYRINGE INTRAVENOUS
Status: CANCELLED | OUTPATIENT
Start: 2022-12-22

## 2022-07-28 RX ORDER — SODIUM CHLORIDE 0.9 % (FLUSH) 0.9 %
10 SYRINGE (ML) INJECTION
Status: DISCONTINUED | OUTPATIENT
Start: 2022-07-28 | End: 2022-07-28 | Stop reason: HOSPADM

## 2022-07-28 RX ADMIN — OCRELIZUMAB 600 MG: 300 INJECTION INTRAVENOUS at 09:07

## 2022-07-28 RX ADMIN — ACETAMINOPHEN 1000 MG: 500 TABLET ORAL at 09:07

## 2022-07-28 RX ADMIN — FAMOTIDINE 20 MG: 10 INJECTION, SOLUTION INTRAVENOUS at 09:07

## 2022-07-28 RX ADMIN — DIPHENHYDRAMINE HYDROCHLORIDE 50 MG: 50 INJECTION INTRAMUSCULAR; INTRAVENOUS at 09:07

## 2022-07-28 RX ADMIN — METHYLPREDNISOLONE SODIUM SUCCINATE 100 MG: 125 INJECTION, POWDER, FOR SOLUTION INTRAMUSCULAR; INTRAVENOUS at 09:07

## 2022-08-10 ENCOUNTER — OFFICE VISIT (OUTPATIENT)
Dept: HEMATOLOGY/ONCOLOGY | Facility: CLINIC | Age: 42
End: 2022-08-10
Payer: MEDICAID

## 2022-08-10 DIAGNOSIS — G35 MULTIPLE SCLEROSIS: ICD-10-CM

## 2022-08-10 DIAGNOSIS — D47.2 MGUS (MONOCLONAL GAMMOPATHY OF UNKNOWN SIGNIFICANCE): Primary | ICD-10-CM

## 2022-08-10 DIAGNOSIS — D50.9 IRON DEFICIENCY ANEMIA, UNSPECIFIED IRON DEFICIENCY ANEMIA TYPE: ICD-10-CM

## 2022-08-10 PROCEDURE — 99215 OFFICE O/P EST HI 40 MIN: CPT | Mod: 95,,, | Performed by: INTERNAL MEDICINE

## 2022-08-10 PROCEDURE — 99215 PR OFFICE/OUTPT VISIT, EST, LEVL V, 40-54 MIN: ICD-10-PCS | Mod: 95,,, | Performed by: INTERNAL MEDICINE

## 2022-08-10 NOTE — PROGRESS NOTES
The patient location is: home  The chief complaint leading to consultation is: elevated IgA level, anemia    Visit type: audiovisual    Face to Face time with patient: 15   30  minutes of total time spent on the encounter, which includes face to face time and non-face to face time preparing to see the patient (eg, review of tests), Obtaining and/or reviewing separately obtained history, Documenting clinical information in the electronic or other health record, Independently interpreting results (not separately reported) and communicating results to the patient/family/caregiver, or Care coordination (not separately reported).         Each patient to whom he or she provides medical services by telemedicine is:  (1) informed of the relationship between the physician and patient and the respective role of any other health care provider with respect to management of the patient; and (2) notified that he or she may decline to receive medical services by telemedicine and may withdraw from such care at any time.    Notes:     SECTION OF HEMATOLOGY AND BONE MARROW TRANSPLANT  Return    Patient Visit   08/14/2022  Referred by:  No ref. provider found  Referred for: elevated IgA level, anemia    CHIEF COMPLAINT: No chief complaint on file.      HISTORY OF PRESENT ILLNESS:   42 y.o.female; pmh of RR MS on ocrevus; referred by neurology for elevated IgA level.    Also pt notes long standing history of anemia.  Denies fever, chills, nightsweats, bleeding, brusing, lymphadenopathy, signs/symptoms of splenomegaly, focal pain, SOB, chest pain, neuropathy, fatigue  Mother present with pt today.  At our initial appt we recommended further myeloma studies to elucidate etiology of her elevated IgA and she presents to review those lab results.   PAST MEDICAL HISTORY:   Past Medical History:   Diagnosis Date    Multiple sclerosis        PAST SURGICAL HISTORY:   Past Surgical History:   Procedure Laterality Date    ANTERIOR CRUCIATE  LIGAMENT REPAIR  1998    right knee    BRAIN BIOPSY  early 2000's       PAST SOCIAL HISTORY:   reports that she has never smoked. She has never used smokeless tobacco. She reports that she does not drink alcohol and does not use drugs.    FAMILY HISTORY:  Family History   Problem Relation Age of Onset    Hypertension Mother     Stomach cancer Father     Kidney disease Paternal Grandmother     Diabetes Paternal Grandmother     Heart disease Paternal Grandmother     Colon cancer Paternal Grandfather        CURRENT MEDICATIONS:   Current Outpatient Medications   Medication Sig    acetaminophen (TYLENOL) 325 MG tablet Take 325 mg by mouth every 6 (six) hours as needed for Pain.    CALCIUM CARBONATE/VITAMIN D3 (VITAMIN D-3 ORAL) Take 5,000 Units by mouth once daily.     cholecalciferol, vitamin D3, 1,250 mcg (50,000 unit) capsule Take 1 capsule (50,000 Units total) by mouth once a week. (Patient not taking: Reported on 7/18/2022)    ocrelizumab (OCREVUS) 30 mg/mL Soln Infuse Ocrevus 600mg in 500mL of 0.9% NaCl IV every 24 weeks. Pre-meds: Solumedrol 100mg IVPB, Benadryl 50mg IVP, Tylenol 1000mg PO, Pepcid 20mg IVP. See attached protocol for dosing rate and infusion duration.     No current facility-administered medications for this visit.     ALLERGIES:   Review of patient's allergies indicates:  No Known Allergies          REVIEW OF SYSTEMS:   Answers for HPI/ROS submitted by the patient on 8/10/2022  appetite change : No  unexpected weight change: No  mouth sores: No  visual disturbance: No  cough: No  shortness of breath: No  chest pain: No  abdominal pain: No  diarrhea: No  frequency: No  back pain: No  rash: No  headaches: No  adenopathy: No  nervous/ anxious: No    PHYSICAL EXAM:   physical exam deferred due to telemed      ECOG Performance Status: (foot note - ECOG PS provided by Eastern Cooperative Oncology Group) 1 - Symptomatic but completely ambulatory    Karnofsky Performance Score:  80%- Normal  Activity with Effort: Some Symptoms of Disease  DATA:   Lab Results   Component Value Date    WBC 8.2 07/28/2022    HGB 10.0 (L) 07/28/2022    HCT 32.6 (L) 07/28/2022    MCV 85.3 07/28/2022     07/28/2022       Gran # (ANC)   Date Value Ref Range Status   09/19/2019 3.3 1.8 - 7.7 K/uL Final     Gran %   Date Value Ref Range Status   09/19/2019 61.8 38.0 - 73.0 % Final     Lymph #   Date Value Ref Range Status   09/19/2019 1.1 1.0 - 4.8 K/uL Final     Lymph %   Date Value Ref Range Status   09/19/2019 21.1 18.0 - 48.0 % Final     CMP  Sodium   Date Value Ref Range Status   09/17/2018 139 136 - 145 mmol/L Final     Sodium Level   Date Value Ref Range Status   07/28/2022 138 136 - 145 mmol/L Final     Potassium   Date Value Ref Range Status   09/17/2018 4.7 3.5 - 5.1 mmol/L Final     Potassium Level   Date Value Ref Range Status   07/28/2022 4.1 3.5 - 5.1 mmol/L Final     Chloride   Date Value Ref Range Status   09/17/2018 105 95 - 110 mmol/L Final     CO2   Date Value Ref Range Status   09/17/2018 25 23 - 29 mmol/L Final     Carbon Dioxide   Date Value Ref Range Status   07/28/2022 22 22 - 29 mmol/L Final     Glucose   Date Value Ref Range Status   09/17/2018 82 70 - 110 mg/dL Final     BUN   Date Value Ref Range Status   09/17/2018 10 6 - 20 mg/dL Final     Blood Urea Nitrogen   Date Value Ref Range Status   07/28/2022 8.2 7.0 - 18.7 mg/dL Final     Creatinine   Date Value Ref Range Status   07/28/2022 0.62 0.55 - 1.02 mg/dL Final   09/17/2018 0.6 0.5 - 1.4 mg/dL Final     Calcium   Date Value Ref Range Status   09/17/2018 9.9 8.7 - 10.5 mg/dL Final     Calcium Level Total   Date Value Ref Range Status   07/28/2022 9.3 8.4 - 10.2 mg/dL Final     Total Protein   Date Value Ref Range Status   09/17/2018 8.0 6.0 - 8.4 g/dL Final     Albumin   Date Value Ref Range Status   09/17/2018 4.0 3.5 - 5.2 g/dL Final     Albumin Level   Date Value Ref Range Status   07/28/2022 3.3 (L) 3.5 - 5.0 gm/dL Final     Total  Bilirubin   Date Value Ref Range Status   09/17/2018 0.5 0.1 - 1.0 mg/dL Final     Comment:     For infants and newborns, interpretation of results should be based  on gestational age, weight and in agreement with clinical  observations.  Premature Infant recommended reference ranges:  Up to 24 hours.............<8.0 mg/dL  Up to 48 hours............<12.0 mg/dL  3-5 days..................<15.0 mg/dL  6-29 days.................<15.0 mg/dL       Bilirubin Total   Date Value Ref Range Status   07/28/2022 0.2 <=1.5 mg/dL Final     Alkaline Phosphatase   Date Value Ref Range Status   07/28/2022 111 40 - 150 unit/L Final   09/17/2018 90 55 - 135 U/L Final     AST   Date Value Ref Range Status   09/17/2018 12 10 - 40 U/L Final     Aspartate Aminotransferase   Date Value Ref Range Status   07/28/2022 11 5 - 34 unit/L Final     ALT   Date Value Ref Range Status   09/17/2018 8 (L) 10 - 44 U/L Final     Alanine Aminotransferase   Date Value Ref Range Status   07/28/2022 8 0 - 55 unit/L Final     Anion Gap   Date Value Ref Range Status   09/17/2018 9 8 - 16 mmol/L Final     eGFR if    Date Value Ref Range Status   09/17/2018 >60.0 >60 mL/min/1.73 m^2 Final     eGFR if non    Date Value Ref Range Status   09/17/2018 >60.0 >60 mL/min/1.73 m^2 Final     Comment:     Calculation used to obtain the estimated glomerular filtration  rate (eGFR) is the CKD-EPI equation.        Lab Results   Component Value Date    TSH 1.066 04/09/2018     Pathology Review  Pathologist Interpretation  Collected: 07/28/22 1700   Result status: Final   Resulting lab: [facility]   Value: Monoclonal spike in beta region (0.45 g/dl).     BLANCA reveals IgA lambda specificity.     Judson Hernandez MD        Latest Reference Range & Units 06/21/22 10:07   IgG 522.00 - 1,631.00 mg/dL 709.00   IgM 33.0 - 293.0 mg/dL 59.0   IgA 65.0 - 421.0 mg/dL 1,217.0 (H)   (H): Data is abnormally high    ASSESSMENT AND PLAN:    Encounter Diagnoses   Name Primary?    MGUS (monoclonal gammopathy of unknown significance) Yes    Iron deficiency anemia, unspecified iron deficiency anemia type        -she has IgA MGUS with 0.45 mspike; incidentally noted on labs done for monitoring of her MS  -this warrants further investigation with bone marrow biopsy and 24 hr urine protein/UEIF/UPEP  -she has no overt CRAB or signs of amyloid; I suspect that her anemia is due to iron deficiency related to her menorrhagia  -I recommended she discussed  Menorrhagia mgmt strategies with her ob/gyn  -she is intolerant to po iron due to gi distress so recommend IV iron   -patient  lives in [place] and prefers to have iron treatment and further  testing done in [place]  -is have been in touch with Dr. Theron Calderon, hematologist at [facility] and he has agreed to assume car with consideration for referral back or SCT consideration should she be found   To have symptomatic myeloma   -continue fu with neurology at [facility] for her RR MS on ocrevus    Follow Up: -with [facility] heme/onc group as above

## 2022-08-16 ENCOUNTER — HOSPITAL ENCOUNTER (OUTPATIENT)
Dept: RADIOLOGY | Facility: HOSPITAL | Age: 42
Discharge: HOME | End: 2022-08-16
Attending: CLINICAL NURSE SPECIALIST
Payer: MEDICAID

## 2022-08-16 DIAGNOSIS — G35 MULTIPLE SCLEROSIS: ICD-10-CM

## 2022-08-16 PROCEDURE — 70551 MRI BRAIN STEM W/O DYE: CPT | Mod: TC

## 2022-09-24 ENCOUNTER — PATIENT MESSAGE (OUTPATIENT)
Dept: NEUROLOGY | Facility: CLINIC | Age: 42
End: 2022-09-24
Payer: MEDICAID

## 2022-10-17 ENCOUNTER — PATIENT MESSAGE (OUTPATIENT)
Dept: NEUROLOGY | Facility: CLINIC | Age: 42
End: 2022-10-17
Payer: MEDICAID

## 2022-10-28 ENCOUNTER — PATIENT MESSAGE (OUTPATIENT)
Dept: NEUROLOGY | Facility: CLINIC | Age: 42
End: 2022-10-28
Payer: MEDICAID

## 2022-12-01 ENCOUNTER — PATIENT MESSAGE (OUTPATIENT)
Dept: PSYCHIATRY | Facility: CLINIC | Age: 42
End: 2022-12-01
Payer: MEDICAID

## 2022-12-21 DIAGNOSIS — G35 MULTIPLE SCLEROSIS: Primary | ICD-10-CM

## 2023-01-04 ENCOUNTER — LAB VISIT (OUTPATIENT)
Dept: LAB | Facility: HOSPITAL | Age: 43
End: 2023-01-04
Attending: CLINICAL NURSE SPECIALIST
Payer: MEDICAID

## 2023-01-04 DIAGNOSIS — G35 MULTIPLE SCLEROSIS: ICD-10-CM

## 2023-01-04 LAB
ALBUMIN SERPL-MCNC: 3.8 G/DL (ref 3.5–5)
ALBUMIN/GLOB SERPL: 1.1 RATIO (ref 1.1–2)
ALP SERPL-CCNC: 104 UNIT/L (ref 40–150)
ALT SERPL-CCNC: 8 UNIT/L (ref 0–55)
AST SERPL-CCNC: 13 UNIT/L (ref 5–34)
BASOPHILS # BLD AUTO: 0.03 X10(3)/MCL (ref 0–0.2)
BASOPHILS NFR BLD AUTO: 0.6 %
BILIRUBIN DIRECT+TOT PNL SERPL-MCNC: 0.3 MG/DL
BUN SERPL-MCNC: 5.7 MG/DL (ref 7–18.7)
CALCIUM SERPL-MCNC: 9.7 MG/DL (ref 8.4–10.2)
CHLORIDE SERPL-SCNC: 103 MMOL/L (ref 98–107)
CO2 SERPL-SCNC: 27 MMOL/L (ref 22–29)
CREAT SERPL-MCNC: 0.63 MG/DL (ref 0.55–1.02)
EOSINOPHIL # BLD AUTO: 0.17 X10(3)/MCL (ref 0–0.9)
EOSINOPHIL NFR BLD AUTO: 3.3 %
ERYTHROCYTE [DISTWIDTH] IN BLOOD BY AUTOMATED COUNT: 15.1 % (ref 11–14.5)
GFR SERPLBLD CREATININE-BSD FMLA CKD-EPI: >60 MLS/MIN/1.73/M2
GLOBULIN SER-MCNC: 3.6 GM/DL (ref 2.4–3.5)
GLUCOSE SERPL-MCNC: 89 MG/DL (ref 74–100)
HCT VFR BLD AUTO: 32.8 % (ref 37–47)
HGB BLD-MCNC: 10 GM/DL (ref 12–16)
IGA SERPL-MCNC: 1272 MG/DL (ref 65–421)
IGG SERPL-MCNC: 723 MG/DL (ref 522–1631)
IGM SERPL-MCNC: 54 MG/DL (ref 33–293)
IMM GRANULOCYTES # BLD AUTO: 0.02 X10(3)/MCL (ref 0–0.04)
IMM GRANULOCYTES NFR BLD AUTO: 0.4 %
LYMPHOCYTES # BLD AUTO: 1.21 X10(3)/MCL (ref 0.6–4.6)
LYMPHOCYTES NFR BLD AUTO: 23.6 %
MCH RBC QN AUTO: 26.2 PG
MCHC RBC AUTO-ENTMCNC: 30.5 MG/DL (ref 33–36)
MCV RBC AUTO: 85.9 FL (ref 80–94)
MONOCYTES # BLD AUTO: 0.53 X10(3)/MCL (ref 0.1–1.3)
MONOCYTES NFR BLD AUTO: 10.3 %
NEUTROPHILS # BLD AUTO: 3.17 X10(3)/MCL (ref 2.1–9.2)
NEUTROPHILS NFR BLD AUTO: 61.8 %
NRBC BLD AUTO-RTO: 0 % (ref 0–1)
PLATELET # BLD AUTO: 332 X10(3)/MCL (ref 140–371)
PMV BLD AUTO: 10.9 FL (ref 9.4–12.4)
POTASSIUM SERPL-SCNC: 4.6 MMOL/L (ref 3.5–5.1)
PROT SERPL-MCNC: 7.4 GM/DL (ref 6.4–8.3)
RBC # BLD AUTO: 3.82 X10(6)/MCL (ref 4.2–5.4)
SODIUM SERPL-SCNC: 139 MMOL/L (ref 136–145)
WBC # SPEC AUTO: 5.1 X10(3)/MCL (ref 4.5–11.5)

## 2023-01-04 PROCEDURE — 85025 COMPLETE CBC W/AUTO DIFF WBC: CPT

## 2023-01-04 PROCEDURE — 36415 COLL VENOUS BLD VENIPUNCTURE: CPT

## 2023-01-04 PROCEDURE — 80053 COMPREHEN METABOLIC PANEL: CPT

## 2023-01-04 PROCEDURE — 87340 HEPATITIS B SURFACE AG IA: CPT

## 2023-01-04 PROCEDURE — 82784 ASSAY IGA/IGD/IGG/IGM EACH: CPT

## 2023-01-04 PROCEDURE — 86704 HEP B CORE ANTIBODY TOTAL: CPT

## 2023-01-05 LAB
HBV CORE AB SERPL QL IA: NONREACTIVE
HBV SURFACE AG SERPL QL IA: NONREACTIVE

## 2023-01-07 ENCOUNTER — TELEPHONE (OUTPATIENT)
Dept: NEUROLOGY | Facility: CLINIC | Age: 43
End: 2023-01-07

## 2023-01-08 NOTE — TELEPHONE ENCOUNTER
----- Message from Anju Christopher, LAMAR, CNS sent at 1/7/2023 11:18 AM CST -----  Negative hep B labs   Elevated IgA; has MGUS; please ask her mom if she has established care with hem/onc in [place] per Dr. Malone's recommendation; this is important   Normal WBC and ALC   Ok to proceed with Ocrevus infusion

## 2023-01-21 RX ORDER — EPINEPHRINE 0.3 MG/.3ML
0.3 INJECTION SUBCUTANEOUS
Status: CANCELLED | OUTPATIENT
Start: 2023-01-21

## 2023-01-21 RX ORDER — ACETAMINOPHEN 500 MG
1000 TABLET ORAL
Status: CANCELLED | OUTPATIENT
Start: 2023-01-21

## 2023-01-21 RX ORDER — FAMOTIDINE 10 MG/ML
20 INJECTION INTRAVENOUS
Status: CANCELLED | OUTPATIENT
Start: 2023-01-21

## 2023-01-21 RX ORDER — DIPHENHYDRAMINE HYDROCHLORIDE 50 MG/ML
50 INJECTION INTRAMUSCULAR; INTRAVENOUS
Status: CANCELLED | OUTPATIENT
Start: 2023-01-21

## 2023-01-21 RX ORDER — METHYLPREDNISOLONE SOD SUCC 125 MG
100 VIAL (EA) INJECTION
Status: CANCELLED
Start: 2023-01-21

## 2023-01-21 RX ORDER — SODIUM CHLORIDE 0.9 % (FLUSH) 0.9 %
10 SYRINGE (ML) INJECTION
Status: CANCELLED | OUTPATIENT
Start: 2023-01-21

## 2023-01-21 RX ORDER — HEPARIN 100 UNIT/ML
500 SYRINGE INTRAVENOUS
Status: CANCELLED | OUTPATIENT
Start: 2023-01-21

## 2023-01-25 RX ORDER — DIPHENHYDRAMINE HYDROCHLORIDE 50 MG/ML
50 INJECTION INTRAMUSCULAR; INTRAVENOUS
Status: CANCELLED
Start: 2023-01-25

## 2023-01-27 ENCOUNTER — TELEPHONE (OUTPATIENT)
Dept: NEUROLOGY | Facility: CLINIC | Age: 43
End: 2023-01-27
Payer: COMMERCIAL

## 2023-01-27 NOTE — TELEPHONE ENCOUNTER
----- Message from Emily Evans sent at 1/27/2023 10:59 AM CST -----  Contact: Raiza Eastman from  [facility]  is calling to verify pt infusion that was completed on 1/19. They would like to know who supplied the infusion Meds.  [facility] or [facility].     Ref#([id number])        Confirmed contact below:   Contact Name:Raiza   Phone Number: [phone] (Direct Line) Can leave a detailed VM along with name and Title

## 2023-02-04 ENCOUNTER — TELEPHONE (OUTPATIENT)
Dept: NEUROLOGY | Facility: CLINIC | Age: 43
End: 2023-02-04

## 2023-02-07 ENCOUNTER — PATIENT MESSAGE (OUTPATIENT)
Dept: NEUROLOGY | Facility: CLINIC | Age: 43
End: 2023-02-07
Payer: MEDICAID

## 2023-02-13 ENCOUNTER — INFUSION (OUTPATIENT)
Dept: INFUSION THERAPY | Facility: HOSPITAL | Age: 43
End: 2023-02-13
Attending: PSYCHIATRY & NEUROLOGY
Payer: MEDICAID

## 2023-02-13 VITALS
TEMPERATURE: 98 F | RESPIRATION RATE: 18 BRPM | DIASTOLIC BLOOD PRESSURE: 76 MMHG | SYSTOLIC BLOOD PRESSURE: 119 MMHG | HEART RATE: 89 BPM

## 2023-02-13 DIAGNOSIS — G35 MS (MULTIPLE SCLEROSIS): Primary | ICD-10-CM

## 2023-02-13 PROCEDURE — 25000003 PHARM REV CODE 250: Performed by: PSYCHIATRY & NEUROLOGY

## 2023-02-13 PROCEDURE — 96415 CHEMO IV INFUSION ADDL HR: CPT

## 2023-02-13 PROCEDURE — 96413 CHEMO IV INFUSION 1 HR: CPT

## 2023-02-13 PROCEDURE — 63600175 PHARM REV CODE 636 W HCPCS: Performed by: PSYCHIATRY & NEUROLOGY

## 2023-02-13 PROCEDURE — 96375 TX/PRO/DX INJ NEW DRUG ADDON: CPT

## 2023-02-13 RX ORDER — METHYLPREDNISOLONE SOD SUCC 125 MG
100 VIAL (EA) INJECTION
Status: CANCELLED
Start: 2023-06-19

## 2023-02-13 RX ORDER — SODIUM CHLORIDE 0.9 % (FLUSH) 0.9 %
10 SYRINGE (ML) INJECTION
Status: DISCONTINUED | OUTPATIENT
Start: 2023-02-13 | End: 2023-02-13 | Stop reason: HOSPADM

## 2023-02-13 RX ORDER — DIPHENHYDRAMINE HYDROCHLORIDE 50 MG/ML
50 INJECTION INTRAMUSCULAR; INTRAVENOUS
Status: DISCONTINUED | OUTPATIENT
Start: 2023-02-13 | End: 2023-02-13 | Stop reason: HOSPADM

## 2023-02-13 RX ORDER — DIPHENHYDRAMINE HYDROCHLORIDE 50 MG/ML
50 INJECTION INTRAMUSCULAR; INTRAVENOUS
Status: COMPLETED | OUTPATIENT
Start: 2023-02-13 | End: 2023-02-13

## 2023-02-13 RX ORDER — HEPARIN 100 UNIT/ML
500 SYRINGE INTRAVENOUS
Status: CANCELLED | OUTPATIENT
Start: 2023-06-19

## 2023-02-13 RX ORDER — EPINEPHRINE 0.3 MG/.3ML
0.3 INJECTION SUBCUTANEOUS
Status: CANCELLED | OUTPATIENT
Start: 2023-06-19

## 2023-02-13 RX ORDER — ACETAMINOPHEN 500 MG
1000 TABLET ORAL
Status: CANCELLED | OUTPATIENT
Start: 2023-06-19

## 2023-02-13 RX ORDER — ACETAMINOPHEN 500 MG
1000 TABLET ORAL
Status: COMPLETED | OUTPATIENT
Start: 2023-02-13 | End: 2023-02-13

## 2023-02-13 RX ORDER — SODIUM CHLORIDE 0.9 % (FLUSH) 0.9 %
10 SYRINGE (ML) INJECTION
Status: CANCELLED | OUTPATIENT
Start: 2023-06-19

## 2023-02-13 RX ORDER — DIPHENHYDRAMINE HYDROCHLORIDE 50 MG/ML
50 INJECTION INTRAMUSCULAR; INTRAVENOUS
Status: CANCELLED
Start: 2023-06-19

## 2023-02-13 RX ORDER — HEPARIN 100 UNIT/ML
500 SYRINGE INTRAVENOUS
Status: DISCONTINUED | OUTPATIENT
Start: 2023-02-13 | End: 2023-02-13 | Stop reason: HOSPADM

## 2023-02-13 RX ORDER — FAMOTIDINE 10 MG/ML
20 INJECTION INTRAVENOUS
Status: COMPLETED | OUTPATIENT
Start: 2023-02-13 | End: 2023-02-13

## 2023-02-13 RX ORDER — FAMOTIDINE 10 MG/ML
20 INJECTION INTRAVENOUS
Status: CANCELLED | OUTPATIENT
Start: 2023-06-19

## 2023-02-13 RX ORDER — DIPHENHYDRAMINE HYDROCHLORIDE 50 MG/ML
50 INJECTION INTRAMUSCULAR; INTRAVENOUS
Status: CANCELLED | OUTPATIENT
Start: 2023-06-19

## 2023-02-13 RX ORDER — METHYLPREDNISOLONE SOD SUCC 125 MG
100 VIAL (EA) INJECTION
Status: COMPLETED | OUTPATIENT
Start: 2023-02-13 | End: 2023-02-13

## 2023-02-13 RX ORDER — EPINEPHRINE 0.3 MG/.3ML
0.3 INJECTION SUBCUTANEOUS
Status: DISCONTINUED | OUTPATIENT
Start: 2023-02-13 | End: 2023-02-13 | Stop reason: HOSPADM

## 2023-02-13 RX ADMIN — FAMOTIDINE 20 MG: 10 INJECTION, SOLUTION INTRAVENOUS at 08:02

## 2023-02-13 RX ADMIN — METHYLPREDNISOLONE SODIUM SUCCINATE 100 MG: 125 INJECTION, POWDER, FOR SOLUTION INTRAMUSCULAR; INTRAVENOUS at 08:02

## 2023-02-13 RX ADMIN — ACETAMINOPHEN 1000 MG: 500 TABLET ORAL at 08:02

## 2023-02-13 RX ADMIN — OCRELIZUMAB 600 MG: 300 INJECTION INTRAVENOUS at 08:02

## 2023-02-13 RX ADMIN — DIPHENHYDRAMINE HYDROCHLORIDE 50 MG: 50 INJECTION INTRAMUSCULAR; INTRAVENOUS at 08:02

## 2023-02-20 ENCOUNTER — PATIENT MESSAGE (OUTPATIENT)
Dept: PSYCHIATRY | Facility: CLINIC | Age: 43
End: 2023-02-20
Payer: MEDICAID

## 2023-07-21 ENCOUNTER — PATIENT MESSAGE (OUTPATIENT)
Dept: PSYCHIATRY | Facility: CLINIC | Age: 43
End: 2023-07-21
Payer: MEDICAID

## 2023-08-02 ENCOUNTER — PATIENT MESSAGE (OUTPATIENT)
Dept: NEUROLOGY | Facility: CLINIC | Age: 43
End: 2023-08-02
Payer: MEDICAID

## 2023-08-02 DIAGNOSIS — G35 MULTIPLE SCLEROSIS: Primary | ICD-10-CM

## 2023-08-03 ENCOUNTER — PATIENT MESSAGE (OUTPATIENT)
Dept: NEUROLOGY | Facility: CLINIC | Age: 43
End: 2023-08-03
Payer: MEDICAID

## 2023-08-04 ENCOUNTER — LAB VISIT (OUTPATIENT)
Dept: LAB | Facility: HOSPITAL | Age: 43
End: 2023-08-04
Attending: CLINICAL NURSE SPECIALIST
Payer: MEDICAID

## 2023-08-04 DIAGNOSIS — G35 MULTIPLE SCLEROSIS: ICD-10-CM

## 2023-08-04 LAB
ALBUMIN SERPL-MCNC: 3.6 G/DL (ref 3.5–5)
ALBUMIN/GLOB SERPL: 1.1 RATIO (ref 1.1–2)
ALP SERPL-CCNC: 101 UNIT/L (ref 40–150)
ALT SERPL-CCNC: 6 UNIT/L (ref 0–55)
AST SERPL-CCNC: 11 UNIT/L (ref 5–34)
BASOPHILS # BLD AUTO: 0.03 X10(3)/MCL
BASOPHILS NFR BLD AUTO: 0.5 %
BILIRUBIN DIRECT+TOT PNL SERPL-MCNC: 0.3 MG/DL
BUN SERPL-MCNC: 9.7 MG/DL (ref 7–18.7)
CALCIUM SERPL-MCNC: 9.3 MG/DL (ref 8.4–10.2)
CHLORIDE SERPL-SCNC: 112 MMOL/L (ref 98–107)
CO2 SERPL-SCNC: 27 MMOL/L (ref 22–29)
CREAT SERPL-MCNC: 0.64 MG/DL (ref 0.55–1.02)
EOSINOPHIL # BLD AUTO: 0.21 X10(3)/MCL (ref 0–0.9)
EOSINOPHIL NFR BLD AUTO: 3.6 %
ERYTHROCYTE [DISTWIDTH] IN BLOOD BY AUTOMATED COUNT: 14.2 % (ref 11.5–17)
GFR SERPLBLD CREATININE-BSD FMLA CKD-EPI: >60 MLS/MIN/1.73/M2
GLOBULIN SER-MCNC: 3.3 GM/DL (ref 2.4–3.5)
GLUCOSE SERPL-MCNC: 112 MG/DL (ref 74–100)
HBV CORE AB SERPL QL IA: NONREACTIVE
HBV SURFACE AG SERPL QL IA: NONREACTIVE
HCT VFR BLD AUTO: 30.3 % (ref 37–47)
HGB BLD-MCNC: 9.9 G/DL (ref 12–16)
IGA SERPL-MCNC: 1255 MG/DL (ref 65–421)
IGG SERPL-MCNC: 666 MG/DL (ref 522–1631)
IGM SERPL-MCNC: 46 MG/DL (ref 33–293)
IMM GRANULOCYTES # BLD AUTO: 0.02 X10(3)/MCL (ref 0–0.04)
IMM GRANULOCYTES NFR BLD AUTO: 0.3 %
LYMPHOCYTES # BLD AUTO: 1.34 X10(3)/MCL (ref 0.6–4.6)
LYMPHOCYTES NFR BLD AUTO: 23 %
MCH RBC QN AUTO: 27.3 PG (ref 27–31)
MCHC RBC AUTO-ENTMCNC: 32.7 G/DL (ref 33–36)
MCV RBC AUTO: 83.5 FL (ref 80–94)
MONOCYTES # BLD AUTO: 0.54 X10(3)/MCL (ref 0.1–1.3)
MONOCYTES NFR BLD AUTO: 9.3 %
NEUTROPHILS # BLD AUTO: 3.68 X10(3)/MCL (ref 2.1–9.2)
NEUTROPHILS NFR BLD AUTO: 63.3 %
NRBC BLD AUTO-RTO: 0 %
PLATELET # BLD AUTO: 302 X10(3)/MCL (ref 130–400)
PMV BLD AUTO: 10.5 FL (ref 7.4–10.4)
POTASSIUM SERPL-SCNC: 4.1 MMOL/L (ref 3.5–5.1)
PROT SERPL-MCNC: 6.9 GM/DL (ref 6.4–8.3)
RBC # BLD AUTO: 3.63 X10(6)/MCL (ref 4.2–5.4)
SODIUM SERPL-SCNC: 147 MMOL/L (ref 136–145)
WBC # SPEC AUTO: 5.82 X10(3)/MCL (ref 4.5–11.5)

## 2023-08-04 PROCEDURE — 36415 COLL VENOUS BLD VENIPUNCTURE: CPT

## 2023-08-04 PROCEDURE — 86704 HEP B CORE ANTIBODY TOTAL: CPT

## 2023-08-04 PROCEDURE — 87340 HEPATITIS B SURFACE AG IA: CPT

## 2023-08-04 PROCEDURE — 80053 COMPREHEN METABOLIC PANEL: CPT

## 2023-08-04 PROCEDURE — 82784 ASSAY IGA/IGD/IGG/IGM EACH: CPT

## 2023-08-04 PROCEDURE — 85025 COMPLETE CBC W/AUTO DIFF WBC: CPT

## 2023-08-07 ENCOUNTER — OFFICE VISIT (OUTPATIENT)
Dept: NEUROLOGY | Facility: CLINIC | Age: 43
End: 2023-08-07
Payer: MEDICAID

## 2023-08-07 DIAGNOSIS — G35 MULTIPLE SCLEROSIS: Primary | ICD-10-CM

## 2023-08-07 DIAGNOSIS — Z79.899 HIGH RISK MEDICATION USE: ICD-10-CM

## 2023-08-07 DIAGNOSIS — R76.8 ELEVATED IMMUNOGLOBULIN A: ICD-10-CM

## 2023-08-07 DIAGNOSIS — D64.9 ANEMIA, UNSPECIFIED TYPE: ICD-10-CM

## 2023-08-07 DIAGNOSIS — Z29.89 PROPHYLACTIC IMMUNOTHERAPY: ICD-10-CM

## 2023-08-07 DIAGNOSIS — Z71.89 COUNSELING REGARDING GOALS OF CARE: ICD-10-CM

## 2023-08-07 PROCEDURE — 99213 OFFICE O/P EST LOW 20 MIN: CPT | Mod: 95,,, | Performed by: CLINICAL NURSE SPECIALIST

## 2023-08-07 PROCEDURE — 99213 PR OFFICE/OUTPT VISIT, EST, LEVL III, 20-29 MIN: ICD-10-PCS | Mod: 95,,, | Performed by: CLINICAL NURSE SPECIALIST

## 2023-08-07 NOTE — PROGRESS NOTES
Subjective:          Patient ID: Bettye Ch is a 43 y.o. female who presents today for a routine clinic visit for MS.  She was last seen virtually in July 2022. The history has been provided by the patient. .    The patient location is: her home   The chief complaint leading to consultation is: MS     Visit type: audiovisual    Face to Face time with patient: 13 minutes   25 minutes of total time spent on the encounter, which includes face to face time and non-face to face time preparing to see the patient (eg, review of tests), Obtaining and/or reviewing separately obtained history, Documenting clinical information in the electronic or other health record, Independently interpreting results (not separately reported) and communicating results to the patient/family/caregiver, or Care coordination (not separately reported).       Each patient to whom he or she provides medical services by telemedicine is:  (1) informed of the relationship between the physician and patient and the respective role of any other health care provider with respect to management of the patient; and (2) notified that he or she may decline to receive medical services by telemedicine and may withdraw from such care at any time.    MS HPI:  DMT: Ocrevus, last infused in August   Side effects from DMT? No  Taking vitamin D3 as recommended? Yes -  Dose: 5000 units daily  She did not follow up with hematology locally. The recommended doctor does not accept her insurance.   Since the last visit last year, she has felt generally well. She denies any recent infections. She denies any signs of MS relapse.   She has not been exercising, but stays active around the house.   She is not taking oral iron tablets.     Medications:  Current Outpatient Medications   Medication Sig    acetaminophen (TYLENOL) 325 MG tablet Take 325 mg by mouth every 6 (six) hours as needed for Pain.    CALCIUM CARBONATE/VITAMIN D3 (VITAMIN D-3 ORAL) Take 5,000  Units by mouth once daily.     cholecalciferol, vitamin D3, 1,250 mcg (50,000 unit) capsule Take 1 capsule (50,000 Units total) by mouth once a week. (Patient not taking: Reported on 7/18/2022)    ocrelizumab (OCREVUS) 30 mg/mL Soln Infuse Ocrevus 600mg in 500mL of 0.9% NaCl IV every 24 weeks. Pre-meds: Solumedrol 100mg IVPB, Benadryl 50mg IVP, Tylenol 1000mg PO, Pepcid 20mg IVP. See attached protocol for dosing rate and infusion duration.       SOCIAL HISTORY  Social History     Tobacco Use    Smoking status: Never    Smokeless tobacco: Never   Substance Use Topics    Alcohol use: No    Drug use: No       Living arrangements - the patient lives with their family.    ROS:  REVIEW OF SYMPTOMS 8/7/23   Do you feel abnormally tired on most days? No   Do you feel you generally sleep well? Yes   Do you have difficulty controlling your bladder?  No   Do you have difficulty controlling your bowels?  No   Do you have frequent muscle cramps, tightness or spasms in your limbs?  No   Do you have new visual symptoms?  No   Do you have worsening difficulty with your memory or thinking? No   Do you have worsening symptoms of anxiety or depression?  No   For patients who walk, Do you have more difficulty walking?  No   Have you fallen since your last visit?  No   For patients who use wheelchairs: Do you have any skin wounds or breakdown? Not Applicable   Do you have difficulty using your hands?  No   Do you have shooting or burning pain? No   Do you often choke when swallowing liquids or solid food?  No   Do you experience worsening symptoms when overheated? No; trying to avoid the heat; heat makes her tired    Do you need any new equipment such as a wheelchair, walker or shower chair? No   Do you receive co-pay financial assistance for your principal MS medicine? Yes   Would you be interested in participating in an MS research trial in the future? No   For patients on Gilenya, Tecfidera, Aubagio, Rituxan, Ocrevus, Tysabri,  Lemtrada or Methotrexate, are you aware that you should NOT receive live virus vaccines?  Yes   Do you feel you have adequate family/friend support?  Yes   Do you have health insurance?   Yes   Are you currently employed? No   Do you receive SSDI/SSI?  No; denied in the past   Do you use marijuana or cannabis products? No   Have you been diagnosed with a urinary tract infection since your last visit here? No   Have you been diagnosed with a respiratory tract infection since your last visit here? No   Have you been to the emergency room since your last visit here? No   Have you been hospitalized since your last visit here?  No              Objective:        1. 25 foot timed walk:    Neurologic Exam     Mental Status   Oriented to person, place, and time.   Attention: normal. Concentration: normal.   Speech: speech is normal   Level of consciousness: alert  Knowledge: good.   Normal comprehension.     Cranial Nerves     CN VII   Right facial weakness: none  Left facial weakness: none    CN VIII   Hearing: intact    Remainder of exam deferred   Imaging:     Results for orders placed during the hospital encounter of 08/16/22    MRI Brain Demyelinating Without Contrast    Impression  Stable exam without significant interval change compared to 08/26/2021.      Electronically signed by: Daisy Anaya  Date:    08/16/2022  Time:    14:38      Labs:     Lab Results   Component Value Date    QVJCQIPW35HG 27 (L) 09/19/2019    [id number] 60 03/20/2019    [id number] 28 (L) 09/17/2018       Lab Results   Component Value Date    WBC 5.82 08/04/2023    RBC 3.63 (L) 08/04/2023    HGB 9.9 (L) 08/04/2023    HCT 30.3 (L) 08/04/2023    MCV 83.5 08/04/2023    MCH 27.3 08/04/2023    MCHC 32.7 (L) 08/04/2023    RDW 14.2 08/04/2023     08/04/2023    MPV 10.5 (H) 08/04/2023    GRAN 3.3 09/19/2019    GRAN 61.8 09/19/2019    LYMPH 1.1 09/19/2019    LYMPH 21.1 09/19/2019    MONO 0.7 09/19/2019    MONO 12.5 09/19/2019    EOS 0.2  09/19/2019    BASO 0.04 09/19/2019    EOSINOPHIL 3.6 09/19/2019    BASOPHIL 0.8 09/19/2019     Sodium   Date Value Ref Range Status   09/17/2018 139 136 - 145 mmol/L Final     Sodium Level   Date Value Ref Range Status   08/04/2023 147 (H) 136 - 145 mmol/L Final     Potassium   Date Value Ref Range Status   09/17/2018 4.7 3.5 - 5.1 mmol/L Final     Potassium Level   Date Value Ref Range Status   08/04/2023 4.1 3.5 - 5.1 mmol/L Final     Chloride   Date Value Ref Range Status   09/17/2018 105 95 - 110 mmol/L Final     CO2   Date Value Ref Range Status   09/17/2018 25 23 - 29 mmol/L Final     Carbon Dioxide   Date Value Ref Range Status   08/04/2023 27 22 - 29 mmol/L Final     Glucose   Date Value Ref Range Status   09/17/2018 82 70 - 110 mg/dL Final     BUN   Date Value Ref Range Status   09/17/2018 10 6 - 20 mg/dL Final     Blood Urea Nitrogen   Date Value Ref Range Status   08/04/2023 9.7 7.0 - 18.7 mg/dL Final     Creatinine   Date Value Ref Range Status   08/04/2023 0.64 0.55 - 1.02 mg/dL Final   09/17/2018 0.6 0.5 - 1.4 mg/dL Final     Calcium   Date Value Ref Range Status   09/17/2018 9.9 8.7 - 10.5 mg/dL Final     Calcium Level Total   Date Value Ref Range Status   08/04/2023 9.3 8.4 - 10.2 mg/dL Final     Total Protein   Date Value Ref Range Status   09/17/2018 8.0 6.0 - 8.4 g/dL Final     Albumin   Date Value Ref Range Status   09/17/2018 4.0 3.5 - 5.2 g/dL Final     Albumin Level   Date Value Ref Range Status   08/04/2023 3.6 3.5 - 5.0 g/dL Final     Total Bilirubin   Date Value Ref Range Status   09/17/2018 0.5 0.1 - 1.0 mg/dL Final     Comment:     For infants and newborns, interpretation of results should be based  on gestational age, weight and in agreement with clinical  observations.  Premature Infant recommended reference ranges:  Up to 24 hours.............<8.0 mg/dL  Up to 48 hours............<12.0 mg/dL  3-5 days..................<15.0 mg/dL  6-29 days.................<15.0 mg/dL       Bilirubin  Total   Date Value Ref Range Status   08/04/2023 0.3 <=1.5 mg/dL Final     Alkaline Phosphatase   Date Value Ref Range Status   08/04/2023 101 40 - 150 unit/L Final   09/17/2018 90 55 - 135 U/L Final     AST   Date Value Ref Range Status   09/17/2018 12 10 - 40 U/L Final     Aspartate Aminotransferase   Date Value Ref Range Status   08/04/2023 11 5 - 34 unit/L Final     ALT   Date Value Ref Range Status   09/17/2018 8 (L) 10 - 44 U/L Final     Alanine Aminotransferase   Date Value Ref Range Status   08/04/2023 6 0 - 55 unit/L Final     Anion Gap   Date Value Ref Range Status   09/17/2018 9 8 - 16 mmol/L Final     eGFR if    Date Value Ref Range Status   09/17/2018 >60.0 >60 mL/min/1.73 m^2 Final     eGFR if non    Date Value Ref Range Status   09/17/2018 >60.0 >60 mL/min/1.73 m^2 Final     Comment:     Calculation used to obtain the estimated glomerular filtration  rate (eGFR) is the CKD-EPI equation.        Lab Results   Component Value Date    HEPBSAG Negative 09/19/2019    HEPBSAB Reactive (A) 06/21/2022    HEPBCAB Nonreactive 08/04/2023           MS Impression and Plan:     NEURO MULTIPLE SCLEROSIS IMPRESSION:   MS Status:     Number of relapses in the past year?:  0    Clinical Progression:  Clinically Stable    MRI Progression:  Stable  Plan:     DMT:  No change in management    DMT comment:  Continue Ocrevus and Vitamin D. Her labs have been reviewed, and she is due for her Ocrevus infusion now. She is aware of the risks associated with immunosuppressant therapy, including increased risk of infection.          Her mother will call Medicaid and try to determine which hematologists in her area can see her. If she is not successful, I recommend that she return to Dr. Malone here for ongoing assessment.   MRI brain due now.   She will follow up in clinic with me or Dr. Gonzalez before the end of the year.   Sodium level was high on labs, so she was advised to stop using extra  flavoring packets in her water.           LAMAR Orellana, CNS    Problem List Items Addressed This Visit    None  Visit Diagnoses       Multiple sclerosis    -  Primary    Relevant Orders    MRI Brain Demyelinating Without Contrast

## 2023-08-07 NOTE — Clinical Note
F/U with her mom 3rd week of August to see if she has been able to establish care with hematology yet.

## 2023-08-07 NOTE — Clinical Note
ES, please schedule brain MRI (she might want it in [place]) and f/u with either me or Dr. Gonzalez in clinic before the end of the year. Thanks!

## 2023-08-14 ENCOUNTER — INFUSION (OUTPATIENT)
Dept: INFUSION THERAPY | Facility: HOSPITAL | Age: 43
End: 2023-08-14
Attending: CLINICAL NURSE SPECIALIST
Payer: MEDICAID

## 2023-08-14 VITALS
RESPIRATION RATE: 16 BRPM | TEMPERATURE: 98 F | OXYGEN SATURATION: 100 % | SYSTOLIC BLOOD PRESSURE: 101 MMHG | DIASTOLIC BLOOD PRESSURE: 67 MMHG | HEART RATE: 80 BPM

## 2023-08-14 DIAGNOSIS — G35 MS (MULTIPLE SCLEROSIS): Primary | ICD-10-CM

## 2023-08-14 PROCEDURE — 96367 TX/PROPH/DG ADDL SEQ IV INF: CPT

## 2023-08-14 PROCEDURE — 96375 TX/PRO/DX INJ NEW DRUG ADDON: CPT

## 2023-08-14 PROCEDURE — 25000003 PHARM REV CODE 250: Performed by: PSYCHIATRY & NEUROLOGY

## 2023-08-14 PROCEDURE — 63600175 PHARM REV CODE 636 W HCPCS: Performed by: PSYCHIATRY & NEUROLOGY

## 2023-08-14 PROCEDURE — 96365 THER/PROPH/DIAG IV INF INIT: CPT

## 2023-08-14 RX ORDER — DIPHENHYDRAMINE HYDROCHLORIDE 50 MG/ML
50 INJECTION INTRAMUSCULAR; INTRAVENOUS
Status: CANCELLED
Start: 2024-01-15

## 2023-08-14 RX ORDER — ACETAMINOPHEN 500 MG
1000 TABLET ORAL
Status: CANCELLED | OUTPATIENT
Start: 2024-01-15

## 2023-08-14 RX ORDER — EPINEPHRINE 0.3 MG/.3ML
0.3 INJECTION SUBCUTANEOUS
Status: CANCELLED | OUTPATIENT
Start: 2024-01-15

## 2023-08-14 RX ORDER — SODIUM CHLORIDE 0.9 % (FLUSH) 0.9 %
10 SYRINGE (ML) INJECTION
Status: DISCONTINUED | OUTPATIENT
Start: 2023-08-14 | End: 2023-08-14 | Stop reason: HOSPADM

## 2023-08-14 RX ORDER — METHYLPREDNISOLONE SOD SUCC 125 MG
100 VIAL (EA) INJECTION
Status: CANCELLED
Start: 2024-01-15

## 2023-08-14 RX ORDER — HEPARIN 100 UNIT/ML
500 SYRINGE INTRAVENOUS
Status: CANCELLED | OUTPATIENT
Start: 2024-01-15

## 2023-08-14 RX ORDER — FAMOTIDINE 10 MG/ML
20 INJECTION INTRAVENOUS
Status: CANCELLED | OUTPATIENT
Start: 2024-01-15

## 2023-08-14 RX ORDER — ACETAMINOPHEN 500 MG
1000 TABLET ORAL
Status: COMPLETED | OUTPATIENT
Start: 2023-08-14 | End: 2023-08-14

## 2023-08-14 RX ORDER — DIPHENHYDRAMINE HYDROCHLORIDE 50 MG/ML
50 INJECTION INTRAMUSCULAR; INTRAVENOUS
Status: COMPLETED | OUTPATIENT
Start: 2023-08-14 | End: 2023-08-14

## 2023-08-14 RX ORDER — DIPHENHYDRAMINE HYDROCHLORIDE 50 MG/ML
50 INJECTION INTRAMUSCULAR; INTRAVENOUS
Status: CANCELLED | OUTPATIENT
Start: 2024-01-15

## 2023-08-14 RX ORDER — EPINEPHRINE 0.3 MG/.3ML
0.3 INJECTION SUBCUTANEOUS
Status: DISCONTINUED | OUTPATIENT
Start: 2023-08-14 | End: 2023-08-14 | Stop reason: HOSPADM

## 2023-08-14 RX ORDER — FAMOTIDINE 10 MG/ML
20 INJECTION INTRAVENOUS
Status: COMPLETED | OUTPATIENT
Start: 2023-08-14 | End: 2023-08-14

## 2023-08-14 RX ORDER — HEPARIN 100 UNIT/ML
500 SYRINGE INTRAVENOUS
Status: DISCONTINUED | OUTPATIENT
Start: 2023-08-14 | End: 2023-08-14 | Stop reason: HOSPADM

## 2023-08-14 RX ORDER — METHYLPREDNISOLONE SOD SUCC 125 MG
100 VIAL (EA) INJECTION
Status: COMPLETED | OUTPATIENT
Start: 2023-08-14 | End: 2023-08-14

## 2023-08-14 RX ORDER — DIPHENHYDRAMINE HYDROCHLORIDE 50 MG/ML
50 INJECTION INTRAMUSCULAR; INTRAVENOUS
Status: DISCONTINUED | OUTPATIENT
Start: 2023-08-14 | End: 2023-08-14 | Stop reason: HOSPADM

## 2023-08-14 RX ORDER — SODIUM CHLORIDE 0.9 % (FLUSH) 0.9 %
10 SYRINGE (ML) INJECTION
Status: CANCELLED | OUTPATIENT
Start: 2024-01-15

## 2023-08-14 RX ADMIN — ACETAMINOPHEN 1000 MG: 500 TABLET ORAL at 10:08

## 2023-08-14 RX ADMIN — METHYLPREDNISOLONE SODIUM SUCCINATE 100 MG: 125 INJECTION, POWDER, FOR SOLUTION INTRAMUSCULAR; INTRAVENOUS at 10:08

## 2023-08-14 RX ADMIN — OCRELIZUMAB 600 MG: 300 INJECTION INTRAVENOUS at 11:08

## 2023-08-14 RX ADMIN — FAMOTIDINE 20 MG: 10 INJECTION INTRAVENOUS at 10:08

## 2023-08-14 RX ADMIN — DIPHENHYDRAMINE HYDROCHLORIDE 50 MG: 50 INJECTION, SOLUTION INTRAMUSCULAR; INTRAVENOUS at 10:08

## 2023-08-14 RX ADMIN — SODIUM CHLORIDE: 9 INJECTION, SOLUTION INTRAVENOUS at 11:08

## 2023-08-18 ENCOUNTER — TELEPHONE (OUTPATIENT)
Dept: NEUROLOGY | Facility: CLINIC | Age: 43
End: 2023-08-18
Payer: MEDICAID

## 2023-08-18 NOTE — TELEPHONE ENCOUNTER
Spoke to pt's mother and scheduled pt for [facility] and f/u with [facility] on 10/30 at 1:40 PM.

## 2023-08-18 NOTE — TELEPHONE ENCOUNTER
----- Message from LAMAR Peter, CNS sent at 8/7/2023 10:57 AM CDT -----  ES, please schedule brain MRI (she might want it in [place]) and f/u with either me or Dr. Gonzalez in clinic before the end of the year. Thanks!

## 2023-09-29 ENCOUNTER — TELEPHONE (OUTPATIENT)
Dept: NEUROLOGY | Facility: CLINIC | Age: 43
End: 2023-09-29
Payer: MEDICAID

## 2023-09-29 NOTE — TELEPHONE ENCOUNTER
----- Message from LAMAR Peter, CNS sent at 8/7/2023 11:51 AM CDT -----  F/U with her mom 3rd week of August to see if she has been able to establish care with hematology yet.

## 2023-09-29 NOTE — TELEPHONE ENCOUNTER
Spoke with Bettye's mom to reinforce importance of contacting Medicaid and inquiring about a hematology provider in her area. If she is unable to find someone, we can set her up again here at [facility].

## 2023-10-27 NOTE — PROGRESS NOTES
Subjective:          Patient ID: Bettye Ch is a 43 y.o. female who presents today for a routine clinic visit for MS.  She was last seen in a virtual visit in August. The history has been provided by the patient. She is accompanied by her mother.     MS HPI:  DMT: Ocrevus--due in February   Side effects from DMT? No  Taking vitamin D3 as recommended? Yes -  Dose: 5000 units daily  She denies any new symptoms or signs of relapse.   She denies any recent infections.   She does not exercise, but tries to stay active around the house.   She takes an iron pill daily.   She takes Tylenol or Advil as needed.   She has not yet seen hematology for MGUS.     Medications:  Current Outpatient Medications   Medication Sig    acetaminophen (TYLENOL) 325 MG tablet Take 325 mg by mouth every 6 (six) hours as needed for Pain.    CALCIUM CARBONATE/VITAMIN D3 (VITAMIN D-3 ORAL) Take 5,000 Units by mouth once daily.     ocrelizumab (OCREVUS) 30 mg/mL Soln Infuse Ocrevus 600mg in 500mL of 0.9% NaCl IV every 24 weeks. Pre-meds: Solumedrol 100mg IVPB, Benadryl 50mg IVP, Tylenol 1000mg PO, Pepcid 20mg IVP. See attached protocol for dosing rate and infusion duration.       SOCIAL HISTORY  Social History     Tobacco Use    Smoking status: Never    Smokeless tobacco: Never   Substance Use Topics    Alcohol use: No    Drug use: No       Living arrangements - the patient lives with family     ROS:      10/30/2023    11:32 AM   REVIEW OF SYMPTOMS   Do you feel abnormally tired on most days? No   Do you feel you generally sleep well? Yes   Do you have difficulty controlling your bladder?  No   Do you have difficulty controlling your bowels?  No   Do you have frequent muscle cramps, tightness or spasms in your limbs?  No   Do you have new visual symptoms?  No   Do you have worsening difficulty with your memory or thinking? No   Do you have worsening symptoms of anxiety or depression?  No   For patients who walk, Do you have  more difficulty walking?  No   Have you fallen since your last visit?  No   For patients who use wheelchairs: Do you have any skin wounds or breakdown? No   Do you have difficulty using your hands?  No   Do you have shooting or burning pain? No   Do you have difficulty with sexual function?  No   If you are sexually active, are you using birth control? Y/N  N/A No   Do you often choke when swallowing liquids or solid food?  No   Do you experience worsening symptoms when overheated? No   Do you need any new equipment such as a wheelchair, walker or shower chair? No   Do you receive co-pay financial assistance for your principal MS medicine? No   Would you be interested in participating in an MS research trial in the future? No   For patients on Gilenya, Tecfidera, Aubagio, Rituxan, Ocrevus, Tysabri, Lemtrada or Methotrexate, are you aware that you should NOT receive live virus vaccines?  Yes   Do you feel you have adequate family/friend support?  Yes   Do you have health insurance?   Yes   Are you currently employed? No   Do you receive SSDI/SSI?  No   Do you use marijuana or cannabis products? No   Have you been diagnosed with a urinary tract infection since your last visit here? No   Have you been diagnosed with a respiratory tract infection since your last visit here? No   Have you been to the emergency room since your last visit here? No   Have you been hospitalized since your last visit here?  No                Objective:        1. 25 foot timed walk:      8/26/2021    12:00 AM 10/30/2023    12:02 AM   Timed 25 Foot Walk:   Did patient wear an AFO? No No   Was assistive device used? No No   Time for 25 Foot Walk (seconds) 6.1 7.9   Time for 25 Foot Walk (seconds) 6.3 6.9       Neurologic Exam     Mental Status   Oriented to person, place, and time.   Attention: normal. Concentration: normal.   Speech: speech is normal   Level of consciousness: alert  Knowledge: good.   Normal comprehension.     She is sleeping  during the visit and dozes off periodically; otherwise, she is alert and oriented.      Cranial Nerves     CN II   Visual acuity: (20/20 OD, 20/20 OS)    CN III, IV, VI   Pupils are equal, round, and reactive to light.  Extraocular motions are normal.   Right pupil: Shape: regular.   Left pupil: Shape: regular.   Nystagmus: none     CN V   Right facial sensation deficit: none  Left facial sensation deficit: none    CN VII   Right facial weakness: none  Left facial weakness: none    CN VIII   Hearing: intact    CN IX, X   Palate: symmetric    CN XI   Right sternocleidomastoid strength: normal  Left sternocleidomastoid strength: normal  Right trapezius strength: normal  Left trapezius strength: normal    CN XII   Tongue deviation: none    Her top teeth were all pulled.      Motor Exam     Strength   Right neck flexion: 5/5  Left neck flexion: 5/5  Right neck extension: 5/5  Left neck extension: 5/5  Right deltoid: 5/5  Left deltoid: 5/5  Right biceps: 5/5  Left biceps: 5/5  Right triceps: 5/5  Left triceps: 5/5  Right wrist flexion: 5/5  Left wrist flexion: 5/5  Right wrist extension: 5/5  Left wrist extension: 5/5  Right interossei: 5/5  Left interossei: 5/5  Right iliopsoas: 5/5  Left iliopsoas: 5/5  Right quadriceps: 5/5  Left quadriceps: 5/5  Right hamstrin/5  Left hamstrin/5  Right anterior tibial: 5/5  Left anterior tibial: 5/5  Right gastroc: 5/5  Left gastroc: 5/5         Sensory Exam   Right arm vibration: normal  Left arm vibration: normal  Right leg vibration: decreased from toes  Left leg vibration: decreased from toes    Mild decrease in vibratory sense to BLE.          Gait, Coordination, and Reflexes     Gait  Gait: wide-based    Coordination   Romberg: negative  Finger to nose coordination: abnormal (mild dysmetria)  Heel to shin coordination: abnormal  Tandem walking coordination: abnormal (unable to put one foot in front of the other)    Reflexes   Right brachioradialis: 2+  Left  brachioradialis: 2+  Right biceps: 2+  Left biceps: 2+  Right triceps: 2+  Left triceps: 2+  Right patellar: 2+  Left patellar: 2+  Right achilles: 2+  Left achilles: 2+  Right plantar: normal  Left plantar: normal    RSM very slightly slower in left hand compared to right.          Imaging:     Results for orders placed during the hospital encounter of 08/16/22    MRI Brain Demyelinating Without Contrast    Impression  Stable exam without significant interval change compared to 08/26/2021.      Electronically signed by: Daisy Anaya  Date:    08/16/2022  Time:    14:38    Results for orders placed during the hospital encounter of 08/26/21    MRI Cervical Spine Demyelinating Without Contrast    Impression  Continued scattered T2 stir signal lesions throughout the cervical and to lesser degree thoracic spinal cord overall somewhat less conspicuous from prior remain concerning for prior areas of demyelination in light of history.  Please note component of cervical spine is somewhat long segment and may be multifocal confluent regions or large lesion overall similar to prior    No evidence for new cord signal abnormality to suggest new lesion.    Clinical correlation and follow-up advised    .      Electronically signed by: Ben Valdovinos DO  Date:    08/26/2021  Time:    10:39    No results found for this or any previous visit.    Results for orders placed during the hospital encounter of 03/20/19    MRI Brain Demyelinating W W/O Contrast    Impression  Stable appearance of the brain, again exhibiting findings compatible with reported history of multiple sclerosis.  No new or enhancing foci to indicate ongoing or active demyelination    The pituitary gland is enlarged for age with slightly lobulated appearance.  An underlying lesion such as an adenoma not excluded.  Correlation with laboratory values and dedicated pituitary imaging if warranted.    This report was flagged in Epic as abnormal.      Electronically  signed by: Miguel A Sims MD  Date:    03/20/2019  Time:    08:45    Results for orders placed during the hospital encounter of 09/17/18    MRI Cervical Spine Demyelinating W W/O Contrast    Impression  Limited examination, as detailed above.    Extensive findings in the cerebral white matter, cerebellum, brainstem, and cervical spine which are typical for multiple sclerosis.  There are new enhancing lesions in the brainstem and cerebral white matter suggestive of active disease.  The burden of disease in the cervical cord has progressed when compared to MR examination 03/15/2017; however, there is no definite evidence of active disease.  There are overall similar but fewer scattered focal findings throughout the thoracic spine also typical for multiple sclerosis, with 2 punctate enhancing foci posterior to T7 which may represent posterior spinal artery enhancement; however, active demyelination cannot be completely excluded.  Recommend clinical correlation and continued follow-up.    1.7 cm left renal cyst.    Additional findings as above.    COMMUNICATION  This critical result was discovered/received at 1530.  The critical information above was relayed directly by me by telephone to Louise Gonzalez MD on 09/17/2018 at 1800.    Electronically signed by resident: Mateo Panchal  Date:    09/17/2018  Time:    17:31    Electronically signed by: Magan Murphy MD  Date:    09/17/2018  Time:    21:31    Results for orders placed during the hospital encounter of 09/17/18    MRI Thoracic Spine Demyelinating W W/O Contrast    Impression  Limited examination, as detailed above.    Extensive findings in the cerebral white matter, cerebellum, brainstem, and cervical spine which are typical for multiple sclerosis.  There are new enhancing lesions in the brainstem and cerebral white matter suggestive of active disease.  The burden of disease in the cervical cord has progressed when compared to MR examination 03/15/2017; however,  there is no definite evidence of active disease.  There are overall similar but fewer scattered focal findings throughout the thoracic spine also typical for multiple sclerosis, with 2 punctate enhancing foci posterior to T7 which may represent posterior spinal artery enhancement; however, active demyelination cannot be completely excluded.  Recommend clinical correlation and continued follow-up.    1.7 cm left renal cyst.    Additional findings as above.    COMMUNICATION  This critical result was discovered/received at 1530.  The critical information above was relayed directly by me by telephone to Louise Gonzalez MD on 09/17/2018 at 1800.    Electronically signed by resident: Mateo Panchal  Date:    09/17/2018  Time:    17:31    Electronically signed by: Magan Murphy MD  Date:    09/17/2018  Time:    21:31        Labs:     Lab Results   Component Value Date    PIOHNNOO25AQ 27 (L) 09/19/2019    DXMNTVHD00FS 60 03/20/2019    BEEMXDZE29HB 28 (L) 09/17/2018     Lab Results   Component Value Date    JCVINDEX SEE COMMENT (A) 03/24/2015    JCVANTIBODY SEE COMMENT 03/24/2015     Lab Results   Component Value Date    FI6GSXJW 72.6 03/15/2017    ABSOLUTECD3 1472 03/15/2017    RG9XKOQE 18.1 03/15/2017    ABSOLUTECD8 367 03/15/2017    PX5QHKFL 52.4 03/15/2017    ABSOLUTECD4 1062 03/15/2017    LABCD48 2.89 03/15/2017     Lab Results   Component Value Date    WBC 5.82 08/04/2023    RBC 3.63 (L) 08/04/2023    HGB 9.9 (L) 08/04/2023    HCT 30.3 (L) 08/04/2023    MCV 83.5 08/04/2023    MCH 27.3 08/04/2023    MCHC 32.7 (L) 08/04/2023    RDW 14.2 08/04/2023     08/04/2023    MPV 10.5 (H) 08/04/2023    GRAN 3.3 09/19/2019    GRAN 61.8 09/19/2019    LYMPH 1.1 09/19/2019    LYMPH 21.1 09/19/2019    MONO 0.7 09/19/2019    MONO 12.5 09/19/2019    EOS 0.2 09/19/2019    BASO 0.04 09/19/2019    EOSINOPHIL 3.6 09/19/2019    BASOPHIL 0.8 09/19/2019     Sodium   Date Value Ref Range Status   09/17/2018 139 136 - 145 mmol/L Final      Sodium Level   Date Value Ref Range Status   08/04/2023 147 (H) 136 - 145 mmol/L Final     Potassium   Date Value Ref Range Status   09/17/2018 4.7 3.5 - 5.1 mmol/L Final     Potassium Level   Date Value Ref Range Status   08/04/2023 4.1 3.5 - 5.1 mmol/L Final     Chloride   Date Value Ref Range Status   09/17/2018 105 95 - 110 mmol/L Final     CO2   Date Value Ref Range Status   09/17/2018 25 23 - 29 mmol/L Final     Carbon Dioxide   Date Value Ref Range Status   08/04/2023 27 22 - 29 mmol/L Final     Glucose   Date Value Ref Range Status   09/17/2018 82 70 - 110 mg/dL Final     BUN   Date Value Ref Range Status   09/17/2018 10 6 - 20 mg/dL Final     Blood Urea Nitrogen   Date Value Ref Range Status   08/04/2023 9.7 7.0 - 18.7 mg/dL Final     Creatinine   Date Value Ref Range Status   08/04/2023 0.64 0.55 - 1.02 mg/dL Final   09/17/2018 0.6 0.5 - 1.4 mg/dL Final     Calcium   Date Value Ref Range Status   09/17/2018 9.9 8.7 - 10.5 mg/dL Final     Calcium Level Total   Date Value Ref Range Status   08/04/2023 9.3 8.4 - 10.2 mg/dL Final     Total Protein   Date Value Ref Range Status   09/17/2018 8.0 6.0 - 8.4 g/dL Final     Albumin   Date Value Ref Range Status   09/17/2018 4.0 3.5 - 5.2 g/dL Final     Albumin Level   Date Value Ref Range Status   08/04/2023 3.6 3.5 - 5.0 g/dL Final     Total Bilirubin   Date Value Ref Range Status   09/17/2018 0.5 0.1 - 1.0 mg/dL Final     Comment:     For infants and newborns, interpretation of results should be based  on gestational age, weight and in agreement with clinical  observations.  Premature Infant recommended reference ranges:  Up to 24 hours.............<8.0 mg/dL  Up to 48 hours............<12.0 mg/dL  3-5 days..................<15.0 mg/dL  6-29 days.................<15.0 mg/dL       Bilirubin Total   Date Value Ref Range Status   08/04/2023 0.3 <=1.5 mg/dL Final     Alkaline Phosphatase   Date Value Ref Range Status   08/04/2023 101 40 - 150 unit/L Final    09/17/2018 90 55 - 135 U/L Final     AST   Date Value Ref Range Status   09/17/2018 12 10 - 40 U/L Final     Aspartate Aminotransferase   Date Value Ref Range Status   08/04/2023 11 5 - 34 unit/L Final     ALT   Date Value Ref Range Status   09/17/2018 8 (L) 10 - 44 U/L Final     Alanine Aminotransferase   Date Value Ref Range Status   08/04/2023 6 0 - 55 unit/L Final     Anion Gap   Date Value Ref Range Status   09/17/2018 9 8 - 16 mmol/L Final     eGFR if    Date Value Ref Range Status   09/17/2018 >60.0 >60 mL/min/1.73 m^2 Final     eGFR if non    Date Value Ref Range Status   09/17/2018 >60.0 >60 mL/min/1.73 m^2 Final     Comment:     Calculation used to obtain the estimated glomerular filtration  rate (eGFR) is the CKD-EPI equation.        Lab Results   Component Value Date    HEPBSAG Negative 09/19/2019    HEPBSAB Reactive (A) 06/21/2022    HEPBCAB Nonreactive 08/04/2023           MS Impression and Plan:     NEURO MULTIPLE SCLEROSIS IMPRESSION:   MS Status:     Number of relapses in the past year?:  0    Clinical Progression:  Clinically Stable    Clinical Progression comment:  Walk time is slightly slower, but I suspect there is some deconditioning.     MRI Progression:  Stable  Plan:     DMT:  No change in management    DMT comment:  Continue Ocrevus and Vitamin D. Her next infusion is due in February. Safety labs are planned for January. She is aware of the risks associated with immunosuppressant therapy, including increased risk of infection.       Symptom Management:  Implement change in symptom management    Implement Change in Symptom Management:  Gait       I again reinforced importance of establishing care with hematology for MGUS. She has been unsuccessful in finding a local provider who accepts Medicaid. We will set her up with Dr. Malone here.   Referral placed to [facility] physical therapy to help with endurance and balance.   She will follow up with Dr. Gonzalez  virtually in 5-6 months.     Total time spent with patient: 43 MINUTES    Total time spent on encounter: 57 minutes         LAMAR Orellana, CNS    Problem List Items Addressed This Visit    None  Visit Diagnoses       Multiple sclerosis    -  Primary    Relevant Orders    CBC Auto Differential    Comprehensive Metabolic Panel    Hepatitis B Core Antibody, Total    Hepatitis B Surface Antigen    Immunoglobulins (IgG, IgA, IgM) Quantitative    Vitamin D

## 2023-10-30 ENCOUNTER — OFFICE VISIT (OUTPATIENT)
Dept: NEUROLOGY | Facility: CLINIC | Age: 43
End: 2023-10-30
Payer: MEDICAID

## 2023-10-30 ENCOUNTER — HOSPITAL ENCOUNTER (OUTPATIENT)
Dept: RADIOLOGY | Facility: HOSPITAL | Age: 43
Discharge: HOME | End: 2023-10-30
Attending: CLINICAL NURSE SPECIALIST
Payer: MEDICAID

## 2023-10-30 ENCOUNTER — TELEPHONE (OUTPATIENT)
Dept: HEMATOLOGY/ONCOLOGY | Facility: CLINIC | Age: 43
End: 2023-10-30
Payer: MEDICAID

## 2023-10-30 VITALS
WEIGHT: 221 LBS | BODY MASS INDEX: 43.39 KG/M2 | DIASTOLIC BLOOD PRESSURE: 79 MMHG | SYSTOLIC BLOOD PRESSURE: 117 MMHG | HEART RATE: 106 BPM | HEIGHT: 60 IN

## 2023-10-30 DIAGNOSIS — Z79.899 HIGH RISK MEDICATION USE: ICD-10-CM

## 2023-10-30 DIAGNOSIS — G35 MULTIPLE SCLEROSIS: ICD-10-CM

## 2023-10-30 DIAGNOSIS — G35 MULTIPLE SCLEROSIS: Primary | ICD-10-CM

## 2023-10-30 DIAGNOSIS — Z71.89 COUNSELING REGARDING GOALS OF CARE: ICD-10-CM

## 2023-10-30 DIAGNOSIS — Z29.89 PROPHYLACTIC IMMUNOTHERAPY: ICD-10-CM

## 2023-10-30 PROCEDURE — 3078F DIAST BP <80 MM HG: CPT | Mod: CPTII,,, | Performed by: CLINICAL NURSE SPECIALIST

## 2023-10-30 PROCEDURE — 3078F PR MOST RECENT DIASTOLIC BLOOD PRESSURE < 80 MM HG: ICD-10-PCS | Mod: CPTII,,, | Performed by: CLINICAL NURSE SPECIALIST

## 2023-10-30 PROCEDURE — 99215 PR OFFICE/OUTPT VISIT, EST, LEVL V, 40-54 MIN: ICD-10-PCS | Mod: S$PBB,,, | Performed by: CLINICAL NURSE SPECIALIST

## 2023-10-30 PROCEDURE — 70551 MRI BRAIN STEM W/O DYE: CPT | Mod: 26,,, | Performed by: RADIOLOGY

## 2023-10-30 PROCEDURE — 99215 OFFICE O/P EST HI 40 MIN: CPT | Mod: S$PBB,,, | Performed by: CLINICAL NURSE SPECIALIST

## 2023-10-30 PROCEDURE — 70551 MRI BRAIN STEM W/O DYE: CPT | Mod: TC

## 2023-10-30 PROCEDURE — 3008F BODY MASS INDEX DOCD: CPT | Mod: CPTII,,, | Performed by: CLINICAL NURSE SPECIALIST

## 2023-10-30 PROCEDURE — 1159F PR MEDICATION LIST DOCUMENTED IN MEDICAL RECORD: ICD-10-PCS | Mod: CPTII,,, | Performed by: CLINICAL NURSE SPECIALIST

## 2023-10-30 PROCEDURE — 99213 OFFICE O/P EST LOW 20 MIN: CPT | Mod: PBBFAC,25 | Performed by: CLINICAL NURSE SPECIALIST

## 2023-10-30 PROCEDURE — 3074F SYST BP LT 130 MM HG: CPT | Mod: CPTII,,, | Performed by: CLINICAL NURSE SPECIALIST

## 2023-10-30 PROCEDURE — 70551 MRI BRAIN DEMYELINATING WITHOUT CONTRAST: ICD-10-PCS | Mod: 26,,, | Performed by: RADIOLOGY

## 2023-10-30 PROCEDURE — 1159F MED LIST DOCD IN RCRD: CPT | Mod: CPTII,,, | Performed by: CLINICAL NURSE SPECIALIST

## 2023-10-30 PROCEDURE — 3008F PR BODY MASS INDEX (BMI) DOCUMENTED: ICD-10-PCS | Mod: CPTII,,, | Performed by: CLINICAL NURSE SPECIALIST

## 2023-10-30 PROCEDURE — 3074F PR MOST RECENT SYSTOLIC BLOOD PRESSURE < 130 MM HG: ICD-10-PCS | Mod: CPTII,,, | Performed by: CLINICAL NURSE SPECIALIST

## 2023-10-30 PROCEDURE — 99999 PR PBB SHADOW E&M-EST. PATIENT-LVL III: ICD-10-PCS | Mod: PBBFAC,,, | Performed by: CLINICAL NURSE SPECIALIST

## 2023-10-30 PROCEDURE — 99999 PR PBB SHADOW E&M-EST. PATIENT-LVL III: CPT | Mod: PBBFAC,,, | Performed by: CLINICAL NURSE SPECIALIST

## 2023-10-30 NOTE — TELEPHONE ENCOUNTER
----- Message from Hallie Larsen MA sent at 10/30/2023  2:43 PM CDT -----  Good afternoon,    Per Anju Christopher's request, please assist patient in scheduling a follow up appointment with Dr. Malone. Pt would like to be seen for a follow up before the end of the year.    Fabricio,  Hallie

## 2023-10-30 NOTE — PATIENT INSTRUCTIONS
To schedule a follow up appointment with Dr. Miguel A Malone, please call [phone].     Please contact the MS clinic in December to schedule a virtual follow up appointment with Dr. Gonzalez in April. Clinic phone number is [phone].

## 2023-11-02 ENCOUNTER — DOCUMENTATION ONLY (OUTPATIENT)
Dept: NEUROLOGY | Facility: CLINIC | Age: 43
End: 2023-11-02
Payer: MEDICAID

## 2023-11-02 NOTE — PROGRESS NOTES
Faxed PT/OT referral to [facility] at [phone].

## 2023-11-30 ENCOUNTER — DOCUMENTATION ONLY (OUTPATIENT)
Dept: NEUROLOGY | Facility: CLINIC | Age: 43
End: 2023-11-30
Payer: MEDICAID

## 2023-12-15 ENCOUNTER — DOCUMENTATION ONLY (OUTPATIENT)
Dept: NEUROLOGY | Facility: CLINIC | Age: 43
End: 2023-12-15
Payer: MEDICAID

## 2023-12-15 NOTE — PROGRESS NOTES
Faxed pt's signed progress note back to [facility] at [phone].

## 2024-01-08 ENCOUNTER — LAB VISIT (OUTPATIENT)
Dept: LAB | Facility: HOSPITAL | Age: 44
End: 2024-01-08
Attending: CLINICAL NURSE SPECIALIST
Payer: MEDICAID

## 2024-01-08 ENCOUNTER — PATIENT MESSAGE (OUTPATIENT)
Dept: NEUROLOGY | Facility: CLINIC | Age: 44
End: 2024-01-08
Payer: MEDICAID

## 2024-01-08 ENCOUNTER — TELEPHONE (OUTPATIENT)
Dept: NEUROLOGY | Facility: CLINIC | Age: 44
End: 2024-01-08

## 2024-01-08 DIAGNOSIS — G35 MULTIPLE SCLEROSIS: ICD-10-CM

## 2024-01-08 LAB
ALBUMIN SERPL-MCNC: 3.7 G/DL (ref 3.5–5)
ALBUMIN/GLOB SERPL: 1 RATIO (ref 1.1–2)
ALP SERPL-CCNC: 115 UNIT/L (ref 40–150)
ALT SERPL-CCNC: 14 UNIT/L (ref 0–55)
AST SERPL-CCNC: 15 UNIT/L (ref 5–34)
BASOPHILS # BLD AUTO: 0.03 X10(3)/MCL
BASOPHILS NFR BLD AUTO: 0.6 %
BILIRUB SERPL-MCNC: 0.3 MG/DL
BUN SERPL-MCNC: 10.6 MG/DL (ref 7–18.7)
CALCIUM SERPL-MCNC: 9.3 MG/DL (ref 8.4–10.2)
CHLORIDE SERPL-SCNC: 104 MMOL/L (ref 98–107)
CO2 SERPL-SCNC: 27 MMOL/L (ref 22–29)
CREAT SERPL-MCNC: 0.61 MG/DL (ref 0.55–1.02)
DEPRECATED CALCIDIOL+CALCIFEROL SERPL-MC: 22.7 NG/ML (ref 30–80)
EOSINOPHIL # BLD AUTO: 0.19 X10(3)/MCL (ref 0–0.9)
EOSINOPHIL NFR BLD AUTO: 3.9 %
ERYTHROCYTE [DISTWIDTH] IN BLOOD BY AUTOMATED COUNT: 14.7 % (ref 11.5–17)
GFR SERPLBLD CREATININE-BSD FMLA CKD-EPI: >60 MLS/MIN/1.73/M2
GLOBULIN SER-MCNC: 3.6 GM/DL (ref 2.4–3.5)
GLUCOSE SERPL-MCNC: 97 MG/DL (ref 74–100)
HBV CORE AB SERPL QL IA: NONREACTIVE
HBV SURFACE AG SERPL QL IA: NONREACTIVE
HCT VFR BLD AUTO: 34.6 % (ref 37–47)
HGB BLD-MCNC: 10.7 G/DL (ref 12–16)
IGA SERPL-MCNC: 1295 MG/DL (ref 65–421)
IGG SERPL-MCNC: 683 MG/DL (ref 522–1631)
IGM SERPL-MCNC: 49 MG/DL (ref 33–293)
IMM GRANULOCYTES # BLD AUTO: 0.01 X10(3)/MCL (ref 0–0.04)
IMM GRANULOCYTES NFR BLD AUTO: 0.2 %
LYMPHOCYTES # BLD AUTO: 0.93 X10(3)/MCL (ref 0.6–4.6)
LYMPHOCYTES NFR BLD AUTO: 19 %
MCH RBC QN AUTO: 26.8 PG (ref 27–31)
MCHC RBC AUTO-ENTMCNC: 30.9 G/DL (ref 33–36)
MCV RBC AUTO: 86.7 FL (ref 80–94)
MONOCYTES # BLD AUTO: 0.47 X10(3)/MCL (ref 0.1–1.3)
MONOCYTES NFR BLD AUTO: 9.6 %
NEUTROPHILS # BLD AUTO: 3.27 X10(3)/MCL (ref 2.1–9.2)
NEUTROPHILS NFR BLD AUTO: 66.7 %
NRBC BLD AUTO-RTO: 0 %
PLATELET # BLD AUTO: 301 X10(3)/MCL (ref 130–400)
PMV BLD AUTO: 10.5 FL (ref 7.4–10.4)
POTASSIUM SERPL-SCNC: 4.4 MMOL/L (ref 3.5–5.1)
PROT SERPL-MCNC: 7.3 GM/DL (ref 6.4–8.3)
RBC # BLD AUTO: 3.99 X10(6)/MCL (ref 4.2–5.4)
SODIUM SERPL-SCNC: 139 MMOL/L (ref 136–145)
WBC # SPEC AUTO: 4.9 X10(3)/MCL (ref 4.5–11.5)

## 2024-01-08 PROCEDURE — 82784 ASSAY IGA/IGD/IGG/IGM EACH: CPT

## 2024-01-08 PROCEDURE — 85025 COMPLETE CBC W/AUTO DIFF WBC: CPT

## 2024-01-08 PROCEDURE — 36415 COLL VENOUS BLD VENIPUNCTURE: CPT

## 2024-01-08 PROCEDURE — 80053 COMPREHEN METABOLIC PANEL: CPT

## 2024-01-08 PROCEDURE — 87340 HEPATITIS B SURFACE AG IA: CPT

## 2024-01-08 PROCEDURE — 82306 VITAMIN D 25 HYDROXY: CPT

## 2024-01-08 PROCEDURE — 86704 HEP B CORE ANTIBODY TOTAL: CPT

## 2024-01-10 ENCOUNTER — TELEPHONE (OUTPATIENT)
Dept: NEUROLOGY | Facility: CLINIC | Age: 44
End: 2024-01-10
Payer: MEDICAID

## 2024-01-10 NOTE — TELEPHONE ENCOUNTER
----- Message from LAMAR Peter, CNS sent at 1/9/2024  5:18 PM CST -----  She needs virtual with BB in March or April

## 2024-01-22 ENCOUNTER — PATIENT MESSAGE (OUTPATIENT)
Dept: PSYCHIATRY | Facility: CLINIC | Age: 44
End: 2024-01-22
Payer: MEDICAID

## 2024-01-26 RX ORDER — FAMOTIDINE 10 MG/ML
20 INJECTION INTRAVENOUS
Status: CANCELLED | OUTPATIENT
Start: 2024-01-28

## 2024-01-26 RX ORDER — SODIUM CHLORIDE 0.9 % (FLUSH) 0.9 %
10 SYRINGE (ML) INJECTION
Status: CANCELLED | OUTPATIENT
Start: 2024-01-28

## 2024-01-26 RX ORDER — METHYLPREDNISOLONE SOD SUCC 125 MG
100 VIAL (EA) INJECTION
Status: CANCELLED
Start: 2024-01-28

## 2024-01-26 RX ORDER — DIPHENHYDRAMINE HYDROCHLORIDE 50 MG/ML
50 INJECTION INTRAMUSCULAR; INTRAVENOUS
Status: CANCELLED
Start: 2024-01-28

## 2024-01-26 RX ORDER — DIPHENHYDRAMINE HYDROCHLORIDE 50 MG/ML
50 INJECTION INTRAMUSCULAR; INTRAVENOUS
Status: CANCELLED | OUTPATIENT
Start: 2024-01-28

## 2024-01-26 RX ORDER — HEPARIN 100 UNIT/ML
500 SYRINGE INTRAVENOUS
Status: CANCELLED | OUTPATIENT
Start: 2024-01-28

## 2024-01-26 RX ORDER — ACETAMINOPHEN 500 MG
1000 TABLET ORAL
Status: CANCELLED | OUTPATIENT
Start: 2024-01-28

## 2024-01-26 RX ORDER — EPINEPHRINE 0.3 MG/.3ML
0.3 INJECTION SUBCUTANEOUS
Status: CANCELLED | OUTPATIENT
Start: 2024-01-28

## 2024-02-14 ENCOUNTER — INFUSION (OUTPATIENT)
Dept: INFUSION THERAPY | Facility: HOSPITAL | Age: 44
End: 2024-02-14
Attending: CLINICAL NURSE SPECIALIST
Payer: MEDICAID

## 2024-02-14 VITALS
HEIGHT: 61 IN | RESPIRATION RATE: 16 BRPM | DIASTOLIC BLOOD PRESSURE: 72 MMHG | TEMPERATURE: 98 F | BODY MASS INDEX: 40.85 KG/M2 | HEART RATE: 100 BPM | SYSTOLIC BLOOD PRESSURE: 110 MMHG | WEIGHT: 216.38 LBS | OXYGEN SATURATION: 100 %

## 2024-02-14 DIAGNOSIS — G35 MS (MULTIPLE SCLEROSIS): Primary | ICD-10-CM

## 2024-02-14 PROCEDURE — 96365 THER/PROPH/DIAG IV INF INIT: CPT

## 2024-02-14 PROCEDURE — 96366 THER/PROPH/DIAG IV INF ADDON: CPT

## 2024-02-14 PROCEDURE — 96375 TX/PRO/DX INJ NEW DRUG ADDON: CPT

## 2024-02-14 PROCEDURE — 63600175 PHARM REV CODE 636 W HCPCS: Mod: JZ,JG | Performed by: CLINICAL NURSE SPECIALIST

## 2024-02-14 PROCEDURE — 25000003 PHARM REV CODE 250: Performed by: CLINICAL NURSE SPECIALIST

## 2024-02-14 RX ORDER — DIPHENHYDRAMINE HYDROCHLORIDE 50 MG/ML
50 INJECTION INTRAMUSCULAR; INTRAVENOUS
Status: COMPLETED | OUTPATIENT
Start: 2024-02-14 | End: 2024-02-14

## 2024-02-14 RX ORDER — EPINEPHRINE 0.3 MG/.3ML
0.3 INJECTION SUBCUTANEOUS
OUTPATIENT
Start: 2024-07-17

## 2024-02-14 RX ORDER — ACETAMINOPHEN 500 MG
1000 TABLET ORAL
Status: COMPLETED | OUTPATIENT
Start: 2024-02-14 | End: 2024-02-14

## 2024-02-14 RX ORDER — METHYLPREDNISOLONE SOD SUCC 125 MG
100 VIAL (EA) INJECTION
Status: COMPLETED | OUTPATIENT
Start: 2024-02-14 | End: 2024-02-14

## 2024-02-14 RX ORDER — EPINEPHRINE 0.3 MG/.3ML
0.3 INJECTION SUBCUTANEOUS
Status: DISCONTINUED | OUTPATIENT
Start: 2024-02-14 | End: 2024-02-14 | Stop reason: HOSPADM

## 2024-02-14 RX ORDER — DIPHENHYDRAMINE HYDROCHLORIDE 50 MG/ML
50 INJECTION INTRAMUSCULAR; INTRAVENOUS
Start: 2024-07-17

## 2024-02-14 RX ORDER — FAMOTIDINE 10 MG/ML
20 INJECTION INTRAVENOUS
OUTPATIENT
Start: 2024-07-17

## 2024-02-14 RX ORDER — FAMOTIDINE 10 MG/ML
20 INJECTION INTRAVENOUS
Status: COMPLETED | OUTPATIENT
Start: 2024-02-14 | End: 2024-02-14

## 2024-02-14 RX ORDER — SODIUM CHLORIDE 0.9 % (FLUSH) 0.9 %
10 SYRINGE (ML) INJECTION
OUTPATIENT
Start: 2024-07-17

## 2024-02-14 RX ORDER — DIPHENHYDRAMINE HYDROCHLORIDE 50 MG/ML
50 INJECTION INTRAMUSCULAR; INTRAVENOUS
Status: DISCONTINUED | OUTPATIENT
Start: 2024-02-14 | End: 2024-02-14 | Stop reason: HOSPADM

## 2024-02-14 RX ORDER — SODIUM CHLORIDE 0.9 % (FLUSH) 0.9 %
10 SYRINGE (ML) INJECTION
Status: DISCONTINUED | OUTPATIENT
Start: 2024-02-14 | End: 2024-02-14 | Stop reason: HOSPADM

## 2024-02-14 RX ORDER — HEPARIN 100 UNIT/ML
500 SYRINGE INTRAVENOUS
Status: DISCONTINUED | OUTPATIENT
Start: 2024-02-14 | End: 2024-02-14 | Stop reason: HOSPADM

## 2024-02-14 RX ORDER — METHYLPREDNISOLONE SOD SUCC 125 MG
100 VIAL (EA) INJECTION
Start: 2024-07-17

## 2024-02-14 RX ORDER — HEPARIN 100 UNIT/ML
500 SYRINGE INTRAVENOUS
OUTPATIENT
Start: 2024-07-17

## 2024-02-14 RX ORDER — DIPHENHYDRAMINE HYDROCHLORIDE 50 MG/ML
50 INJECTION INTRAMUSCULAR; INTRAVENOUS
OUTPATIENT
Start: 2024-07-17

## 2024-02-14 RX ORDER — ACETAMINOPHEN 500 MG
1000 TABLET ORAL
OUTPATIENT
Start: 2024-07-17

## 2024-02-14 RX ADMIN — DIPHENHYDRAMINE HYDROCHLORIDE 50 MG: 50 INJECTION INTRAMUSCULAR; INTRAVENOUS at 08:02

## 2024-02-14 RX ADMIN — FAMOTIDINE 20 MG: 10 INJECTION INTRAVENOUS at 08:02

## 2024-02-14 RX ADMIN — ACETAMINOPHEN 1000 MG: 500 TABLET, FILM COATED ORAL at 08:02

## 2024-02-14 RX ADMIN — OCRELIZUMAB 600 MG: 300 INJECTION INTRAVENOUS at 08:02

## 2024-02-14 RX ADMIN — METHYLPREDNISOLONE SODIUM SUCCINATE 100 MG: 125 INJECTION, POWDER, FOR SOLUTION INTRAMUSCULAR; INTRAVENOUS at 08:02

## 2024-03-26 ENCOUNTER — PATIENT MESSAGE (OUTPATIENT)
Dept: PSYCHIATRY | Facility: CLINIC | Age: 44
End: 2024-03-26
Payer: MEDICAID

## 2024-04-17 ENCOUNTER — OFFICE VISIT (OUTPATIENT)
Dept: NEUROLOGY | Facility: CLINIC | Age: 44
End: 2024-04-17
Payer: MEDICAID

## 2024-04-17 DIAGNOSIS — Z71.89 COUNSELING REGARDING GOALS OF CARE: ICD-10-CM

## 2024-04-17 DIAGNOSIS — G35 MULTIPLE SCLEROSIS: ICD-10-CM

## 2024-04-17 DIAGNOSIS — D84.9 IMMUNOCOMPROMISED: ICD-10-CM

## 2024-04-17 DIAGNOSIS — G35 MS (MULTIPLE SCLEROSIS): Primary | ICD-10-CM

## 2024-04-17 PROCEDURE — 99215 OFFICE O/P EST HI 40 MIN: CPT | Mod: 95,,, | Performed by: PSYCHIATRY & NEUROLOGY

## 2024-04-17 PROCEDURE — 1159F MED LIST DOCD IN RCRD: CPT | Mod: CPTII,95,, | Performed by: PSYCHIATRY & NEUROLOGY

## 2024-04-17 PROCEDURE — 1160F RVW MEDS BY RX/DR IN RCRD: CPT | Mod: CPTII,95,, | Performed by: PSYCHIATRY & NEUROLOGY

## 2024-04-17 PROCEDURE — G2211 COMPLEX E/M VISIT ADD ON: HCPCS | Mod: 95,,, | Performed by: PSYCHIATRY & NEUROLOGY

## 2024-04-17 NOTE — PROGRESS NOTES
The patient location is: home  The chief complaint leading to consultation is: MS    Visit type: audiovisual    Face to Face time with patient: 30  40 minutes of total time spent on the encounter, which includes face to face time and non-face to face time preparing to see the patient (eg, review of tests), Obtaining and/or reviewing separately obtained history, Documenting clinical information in the electronic or other health record, Independently interpreting results (not separately reported) and communicating results to the patient/family/caregiver, or Care coordination (not separately reported).       Each patient to whom he or she provides medical services by telemedicine is:  (1) informed of the relationship between the physician and patient and the respective role of any other health care provider with respect to management of the patient; and (2) notified that he or she may decline to receive medical services by telemedicine and may withdraw from such care at any time.    Notes:   Subjective:          Patient ID: Bettye Ch is a 43 y.o. female who presents today for a routine visit for MS.      MS HPI:  DMT: ocrelizumab Feb / August  [facility]   Side effects from DMT? No  Taking vitamin D3 as recommended? Yes - 2500  Pt and her mother state pt is stable;   No sense of decline or relapse;   Bladder is stable; no need for a pad; no UTIs.   Patient denies frequent, severe or unusual infections.  Still has not seen hematology - hoping to see either Dr Howard or and [facility] MD locally;     Medications:  Current Outpatient Medications   Medication Sig Dispense Refill    acetaminophen (TYLENOL) 325 MG tablet Take 325 mg by mouth every 6 (six) hours as needed for Pain.      CALCIUM CARBONATE/VITAMIN D3 (VITAMIN D-3 ORAL) Take 5,000 Units by mouth once daily.       ocrelizumab (OCREVUS) 30 mg/mL Soln Infuse Ocrevus 600mg in 500mL of 0.9% NaCl IV every 24 weeks. Pre-meds: Solumedrol 100mg IVPB,  Benadryl 50mg IVP, Tylenol 1000mg PO, Pepcid 20mg IVP. See attached protocol for dosing rate and infusion duration. 20 mL 0     No current facility-administered medications for this visit.       SOCIAL HISTORY  Social History     Tobacco Use    Smoking status: Never    Smokeless tobacco: Never   Substance Use Topics    Alcohol use: No    Drug use: No       Living arrangements - the patient lives with their family.    ROS:        10/30/2023    11:32 AM   REVIEW OF SYMPTOMS   Do you feel abnormally tired on most days? No   Do you feel you generally sleep well? Yes   Do you have difficulty controlling your bladder?  No   Do you have difficulty controlling your bowels?  No   Do you have frequent muscle cramps, tightness or spasms in your limbs?  No   Do you have new visual symptoms?  No   Do you have worsening difficulty with your memory or thinking? No   Do you have worsening symptoms of anxiety or depression?  No   For patients who walk, Do you have more difficulty walking?  No   Have you fallen since your last visit?  No   For patients who use wheelchairs: Do you have any skin wounds or breakdown? No   Do you have difficulty using your hands?  No   Do you have shooting or burning pain? No   Do you have difficulty with sexual function?  No   If you are sexually active, are you using birth control? Y/N  N/A No   Do you often choke when swallowing liquids or solid food?  No   Do you experience worsening symptoms when overheated? No   Do you need any new equipment such as a wheelchair, walker or shower chair? No   Do you receive co-pay financial assistance for your principal MS medicine? No   Would you be interested in participating in an MS research trial in the future? No   For patients on Gilenya, Tecfidera, Aubagio, Rituxan, Ocrevus, Tysabri, Lemtrada or Methotrexate, are you aware that you should NOT receive live virus vaccines?  Yes   Do you feel you have adequate family/friend support?  Yes   Do you have health  insurance?   Yes   Are you currently employed? No   Do you receive SSDI/SSI?  No   Do you use marijuana or cannabis products? No   Have you been diagnosed with a urinary tract infection since your last visit here? No   Have you been diagnosed with a respiratory tract infection since your last visit here? No   Have you been to the emergency room since your last visit here? No   Have you been hospitalized since your last visit here?  No                Objective:            8/26/2021    12:00 AM 10/30/2023    12:02 AM   Timed 25 Foot Walk:   Did patient wear an AFO? No No   Was assistive device used? No No   Time for 25 Foot Walk (seconds) 6.1 7.9   Time for 25 Foot Walk (seconds) 6.3 6.9       Neurologic Exam    MS: fluent, follows commands; pt's mom answers majority of the questions  CN: no dysarthria; no facial asymmetry  MOTOR: moves all limbs well  COORD: mild dystaxia on FTN  GAIT: wide based causal gait      Imaging:     Results for orders placed during the hospital encounter of 10/30/23    MRI Brain Demyelinating Without Contrast    Impression  Brain appears stable from prior exam, again demonstrating findings compatible with the reported history of multiple sclerosis.  No new discrete lesions to indicate ongoing demyelination.      Electronically signed by: Miguel A Sims MD  Date:    10/30/2023  Time:    11:30    Results for orders placed during the hospital encounter of 08/26/21    MRI Cervical Spine Demyelinating Without Contrast    Impression  Continued scattered T2 stir signal lesions throughout the cervical and to lesser degree thoracic spinal cord overall somewhat less conspicuous from prior remain concerning for prior areas of demyelination in light of history.  Please note component of cervical spine is somewhat long segment and may be multifocal confluent regions or large lesion overall similar to prior    No evidence for new cord signal abnormality to suggest new lesion.    Clinical correlation and  follow-up advised    .      Electronically signed by: Ben Valdovinos DO  Date:    08/26/2021  Time:    10:39    No results found for this or any previous visit.    Results for orders placed during the hospital encounter of 03/20/19    MRI Brain Demyelinating W W/O Contrast    Impression  Stable appearance of the brain, again exhibiting findings compatible with reported history of multiple sclerosis.  No new or enhancing foci to indicate ongoing or active demyelination    The pituitary gland is enlarged for age with slightly lobulated appearance.  An underlying lesion such as an adenoma not excluded.  Correlation with laboratory values and dedicated pituitary imaging if warranted.    This report was flagged in Epic as abnormal.      Electronically signed by: Miguel A Sims MD  Date:    03/20/2019  Time:    08:45    Results for orders placed during the hospital encounter of 09/17/18    MRI Cervical Spine Demyelinating W W/O Contrast    Impression  Limited examination, as detailed above.    Extensive findings in the cerebral white matter, cerebellum, brainstem, and cervical spine which are typical for multiple sclerosis.  There are new enhancing lesions in the brainstem and cerebral white matter suggestive of active disease.  The burden of disease in the cervical cord has progressed when compared to MR examination 03/15/2017; however, there is no definite evidence of active disease.  There are overall similar but fewer scattered focal findings throughout the thoracic spine also typical for multiple sclerosis, with 2 punctate enhancing foci posterior to T7 which may represent posterior spinal artery enhancement; however, active demyelination cannot be completely excluded.  Recommend clinical correlation and continued follow-up.    1.7 cm left renal cyst.    Additional findings as above.    COMMUNICATION  This critical result was discovered/received at 1530.  The critical information above was relayed directly by me by  telephone to Louise Gonzalez MD on 09/17/2018 at 1800.    Electronically signed by resident: Mateo Panchal  Date:    09/17/2018  Time:    17:31    Electronically signed by: Magan Murphy MD  Date:    09/17/2018  Time:    21:31    Results for orders placed during the hospital encounter of 09/17/18    MRI Thoracic Spine Demyelinating W W/O Contrast    Impression  Limited examination, as detailed above.    Extensive findings in the cerebral white matter, cerebellum, brainstem, and cervical spine which are typical for multiple sclerosis.  There are new enhancing lesions in the brainstem and cerebral white matter suggestive of active disease.  The burden of disease in the cervical cord has progressed when compared to MR examination 03/15/2017; however, there is no definite evidence of active disease.  There are overall similar but fewer scattered focal findings throughout the thoracic spine also typical for multiple sclerosis, with 2 punctate enhancing foci posterior to T7 which may represent posterior spinal artery enhancement; however, active demyelination cannot be completely excluded.  Recommend clinical correlation and continued follow-up.    1.7 cm left renal cyst.    Additional findings as above.    COMMUNICATION  This critical result was discovered/received at 1530.  The critical information above was relayed directly by me by telephone to Louise Gonzalez MD on 09/17/2018 at 1800.    Electronically signed by resident: Mateo Panchal  Date:    09/17/2018  Time:    17:31    Electronically signed by: Magan Murphy MD  Date:    09/17/2018  Time:    21:31        Labs:     Lab Results   Component Value Date    OCNUNNXA47PD 22.7 (L) 01/08/2024    LHIHLMFY68DW 27 (L) 09/19/2019    EZAIMAMT47RY 60 03/20/2019     Lab Results   Component Value Date    JCVINDEX SEE COMMENT (A) 03/24/2015    JCVANTIBODY SEE COMMENT 03/24/2015     Lab Results   Component Value Date    XY8LAXXM 72.6 03/15/2017    ABSOLUTECD3 1472 03/15/2017     WB1ZRMLI 18.1 03/15/2017    ABSOLUTECD8 367 03/15/2017    IU9LAXCS 52.4 03/15/2017    ABSOLUTECD4 1062 03/15/2017    LABCD48 2.89 03/15/2017     Lab Results   Component Value Date    WBC 4.90 01/08/2024    RBC 3.99 (L) 01/08/2024    HGB 10.7 (L) 01/08/2024    HCT 34.6 (L) 01/08/2024    MCV 86.7 01/08/2024    MCH 26.8 (L) 01/08/2024    MCHC 30.9 (L) 01/08/2024    RDW 14.7 01/08/2024     01/08/2024    MPV 10.5 (H) 01/08/2024    GRAN 3.3 09/19/2019    GRAN 61.8 09/19/2019    LYMPH 1.1 09/19/2019    LYMPH 21.1 09/19/2019    MONO 0.7 09/19/2019    MONO 12.5 09/19/2019    EOS 0.2 09/19/2019    BASO 0.04 09/19/2019    EOSINOPHIL 3.6 09/19/2019    BASOPHIL 0.8 09/19/2019     Sodium   Date Value Ref Range Status   09/17/2018 139 136 - 145 mmol/L Final     Sodium Level   Date Value Ref Range Status   01/08/2024 139 136 - 145 mmol/L Final     Potassium   Date Value Ref Range Status   09/17/2018 4.7 3.5 - 5.1 mmol/L Final     Potassium Level   Date Value Ref Range Status   01/08/2024 4.4 3.5 - 5.1 mmol/L Final     Chloride   Date Value Ref Range Status   09/17/2018 105 95 - 110 mmol/L Final     CO2   Date Value Ref Range Status   09/17/2018 25 23 - 29 mmol/L Final     Carbon Dioxide   Date Value Ref Range Status   01/08/2024 27 22 - 29 mmol/L Final     Glucose   Date Value Ref Range Status   09/17/2018 82 70 - 110 mg/dL Final     BUN   Date Value Ref Range Status   09/17/2018 10 6 - 20 mg/dL Final     Blood Urea Nitrogen   Date Value Ref Range Status   01/08/2024 10.6 7.0 - 18.7 mg/dL Final     Creatinine   Date Value Ref Range Status   01/08/2024 0.61 0.55 - 1.02 mg/dL Final   09/17/2018 0.6 0.5 - 1.4 mg/dL Final     Calcium   Date Value Ref Range Status   09/17/2018 9.9 8.7 - 10.5 mg/dL Final     Calcium Level Total   Date Value Ref Range Status   01/08/2024 9.3 8.4 - 10.2 mg/dL Final     Total Protein   Date Value Ref Range Status   09/17/2018 8.0 6.0 - 8.4 g/dL Final     Albumin   Date Value Ref Range Status    09/17/2018 4.0 3.5 - 5.2 g/dL Final     Albumin Level   Date Value Ref Range Status   01/08/2024 3.7 3.5 - 5.0 g/dL Final     Total Bilirubin   Date Value Ref Range Status   09/17/2018 0.5 0.1 - 1.0 mg/dL Final     Comment:     For infants and newborns, interpretation of results should be based  on gestational age, weight and in agreement with clinical  observations.  Premature Infant recommended reference ranges:  Up to 24 hours.............<8.0 mg/dL  Up to 48 hours............<12.0 mg/dL  3-5 days..................<15.0 mg/dL  6-29 days.................<15.0 mg/dL       Bilirubin Total   Date Value Ref Range Status   01/08/2024 0.3 <=1.5 mg/dL Final     Alkaline Phosphatase   Date Value Ref Range Status   01/08/2024 115 40 - 150 unit/L Final   09/17/2018 90 55 - 135 U/L Final     AST   Date Value Ref Range Status   09/17/2018 12 10 - 40 U/L Final     Aspartate Aminotransferase   Date Value Ref Range Status   01/08/2024 15 5 - 34 unit/L Final     ALT   Date Value Ref Range Status   09/17/2018 8 (L) 10 - 44 U/L Final     Alanine Aminotransferase   Date Value Ref Range Status   01/08/2024 14 0 - 55 unit/L Final     Anion Gap   Date Value Ref Range Status   09/17/2018 9 8 - 16 mmol/L Final     eGFR if    Date Value Ref Range Status   09/17/2018 >60.0 >60 mL/min/1.73 m^2 Final     eGFR if non    Date Value Ref Range Status   09/17/2018 >60.0 >60 mL/min/1.73 m^2 Final     Comment:     Calculation used to obtain the estimated glomerular filtration  rate (eGFR) is the CKD-EPI equation.        Lab Results   Component Value Date    HEPBSAG Negative 09/19/2019    HEPBSAB Reactive (A) 06/21/2022    HEPBCAB Nonreactive 01/08/2024           MS Impression and Plan:     NEURO MULTIPLE SCLEROSIS IMPRESSION:   Number of relapses in the past year?:  0  Clinical Progression:  Clinically Stable  MRI Progression:  Stable  MS Classification:  Relapsing-Remitting MS  DMT:  No change in  management  Symptom Management:  No change in symptom management   Static disability   Stable on Ocrevus  Encouraged her to [facility] ) for MGUS  Labs in July, Ocrevus in August   F/u 6 mo with Anju [facility] and MRIs same day done at [facility]             Problem List Items Addressed This Visit          1 - High    MS (multiple sclerosis) - Primary    Relevant Orders    CBC Auto Differential    Comprehensive Metabolic Panel    PROTEIN ELECTROPHORESIS, SERUM    Immunoglobulins (IgG, IgA, IgM) Quantitative    Hepatitis B Core Antibody, Total    Hepatitis B Surface Antigen    MRI Brain Demyelinating Without Contrast    MRI Cervical Spine Demyelinating Without Contrast     Other Visit Diagnoses       Immunocompromised        Relevant Orders    CBC Auto Differential    Comprehensive Metabolic Panel    PROTEIN ELECTROPHORESIS, SERUM    Immunoglobulins (IgG, IgA, IgM) Quantitative    Hepatitis B Core Antibody, Total    Hepatitis B Surface Antigen    Multiple sclerosis        Counseling regarding goals of care                Louise Gonzalez MD  Visit today included increased complexity associated with the care of the episodic problem : chronic immunotherapy;  addressed and managing the longitudinal care of the patient due to the serious and/or complex managed problem(s) MS.

## 2024-04-23 ENCOUNTER — TELEPHONE (OUTPATIENT)
Dept: NEUROLOGY | Facility: CLINIC | Age: 44
End: 2024-04-23
Payer: MEDICAID

## 2024-04-23 NOTE — TELEPHONE ENCOUNTER
----- Message from Louise Gonzalez MD sent at 4/17/2024  9:02 AM CDT -----  1. Labs in July - [facility]  2. MRIs (am) and visit with AP (afternoon) in October -- Adam Leiva

## 2024-04-23 NOTE — TELEPHONE ENCOUNTER
Spoke to pt's mother, Vicki. I scheduled pt for labs on 7/8 at 10:00 AM at [facility] lab. I also scheduled pt for MRIs on 10/22 starting at 10:45 AM at [facility] [place] and f/u with Anju on 10/22 at 1:40 PM.

## 2024-05-02 ENCOUNTER — PATIENT MESSAGE (OUTPATIENT)
Dept: PSYCHIATRY | Facility: CLINIC | Age: 44
End: 2024-05-02
Payer: MEDICAID

## 2024-07-08 ENCOUNTER — LAB VISIT (OUTPATIENT)
Dept: LAB | Facility: HOSPITAL | Age: 44
End: 2024-07-08
Attending: PSYCHIATRY & NEUROLOGY
Payer: MEDICAID

## 2024-07-08 DIAGNOSIS — G35 MS (MULTIPLE SCLEROSIS): ICD-10-CM

## 2024-07-08 DIAGNOSIS — D84.9 IMMUNOCOMPROMISED: ICD-10-CM

## 2024-07-08 LAB
ALBUMIN SERPL-MCNC: 3.6 G/DL (ref 3.5–5)
ALBUMIN/GLOB SERPL: 1 RATIO (ref 1.1–2)
ALP SERPL-CCNC: 108 UNIT/L (ref 40–150)
ALT SERPL-CCNC: 8 UNIT/L (ref 0–55)
ANION GAP SERPL CALC-SCNC: 9 MEQ/L
AST SERPL-CCNC: 10 UNIT/L (ref 5–34)
BASOPHILS # BLD AUTO: 0.03 X10(3)/MCL
BASOPHILS NFR BLD AUTO: 0.4 %
BILIRUB SERPL-MCNC: 0.3 MG/DL
BUN SERPL-MCNC: 13.2 MG/DL (ref 7–18.7)
CALCIUM SERPL-MCNC: 9.7 MG/DL (ref 8.4–10.2)
CHLORIDE SERPL-SCNC: 106 MMOL/L (ref 98–107)
CO2 SERPL-SCNC: 25 MMOL/L (ref 22–29)
CREAT SERPL-MCNC: 0.68 MG/DL (ref 0.55–1.02)
CREAT/UREA NIT SERPL: 19
EOSINOPHIL # BLD AUTO: 0.2 X10(3)/MCL (ref 0–0.9)
EOSINOPHIL NFR BLD AUTO: 2.9 %
ERYTHROCYTE [DISTWIDTH] IN BLOOD BY AUTOMATED COUNT: 14.6 % (ref 11.5–17)
GFR SERPLBLD CREATININE-BSD FMLA CKD-EPI: >60 ML/MIN/1.73/M2
GLOBULIN SER-MCNC: 3.6 GM/DL (ref 2.4–3.5)
GLUCOSE SERPL-MCNC: 94 MG/DL (ref 74–100)
HBV CORE AB SERPL QL IA: NONREACTIVE
HBV SURFACE AG SERPL QL IA: NONREACTIVE
HCT VFR BLD AUTO: 35.3 % (ref 37–47)
HGB BLD-MCNC: 11 G/DL (ref 12–16)
IGA SERPL-MCNC: 1319 MG/DL (ref 65–421)
IGG SERPL-MCNC: 703 MG/DL (ref 522–1631)
IGM SERPL-MCNC: 49 MG/DL (ref 33–293)
IMM GRANULOCYTES # BLD AUTO: 0.03 X10(3)/MCL (ref 0–0.04)
IMM GRANULOCYTES NFR BLD AUTO: 0.4 %
LYMPHOCYTES # BLD AUTO: 1.03 X10(3)/MCL (ref 0.6–4.6)
LYMPHOCYTES NFR BLD AUTO: 14.8 %
MCH RBC QN AUTO: 28 PG (ref 27–31)
MCHC RBC AUTO-ENTMCNC: 31.2 G/DL (ref 33–36)
MCV RBC AUTO: 89.8 FL (ref 80–94)
MONOCYTES # BLD AUTO: 0.54 X10(3)/MCL (ref 0.1–1.3)
MONOCYTES NFR BLD AUTO: 7.8 %
NEUTROPHILS # BLD AUTO: 5.12 X10(3)/MCL (ref 2.1–9.2)
NEUTROPHILS NFR BLD AUTO: 73.7 %
NRBC BLD AUTO-RTO: 0 %
PLATELET # BLD AUTO: 301 X10(3)/MCL (ref 130–400)
PMV BLD AUTO: 10.8 FL (ref 7.4–10.4)
POTASSIUM SERPL-SCNC: 4.1 MMOL/L (ref 3.5–5.1)
PROT SERPL-MCNC: 7.2 GM/DL (ref 6.4–8.3)
RBC # BLD AUTO: 3.93 X10(6)/MCL (ref 4.2–5.4)
SODIUM SERPL-SCNC: 140 MMOL/L (ref 136–145)
WBC # BLD AUTO: 6.95 X10(3)/MCL (ref 4.5–11.5)

## 2024-07-08 PROCEDURE — 82784 ASSAY IGA/IGD/IGG/IGM EACH: CPT

## 2024-07-08 PROCEDURE — 36415 COLL VENOUS BLD VENIPUNCTURE: CPT

## 2024-07-08 PROCEDURE — 87340 HEPATITIS B SURFACE AG IA: CPT

## 2024-07-08 PROCEDURE — 86704 HEP B CORE ANTIBODY TOTAL: CPT

## 2024-07-08 PROCEDURE — 80053 COMPREHEN METABOLIC PANEL: CPT

## 2024-07-08 PROCEDURE — 85025 COMPLETE CBC W/AUTO DIFF WBC: CPT

## 2024-07-10 LAB
IGA SERPL-MCNC: 1120 MG/DL (ref 61–356)
IGG SERPL-MCNC: 670 MG/DL (ref 767–1590)
IGM SERPL-MCNC: 41 MG/DL (ref 37–286)
IMMUNOLOGIST REVIEW: ABNORMAL
M PROTEIN SERPL QL: POSITIVE
M THERAPEUTIC ANTIBODY ADMINISTERED?: ABNORMAL
M-PROTEIN AL: 0.98 G/DL

## 2024-07-18 ENCOUNTER — TELEPHONE (OUTPATIENT)
Dept: NEUROLOGY | Facility: CLINIC | Age: 44
End: 2024-07-18
Payer: MEDICAID

## 2024-07-18 DIAGNOSIS — D47.2 MGUS (MONOCLONAL GAMMOPATHY OF UNKNOWN SIGNIFICANCE): Primary | ICD-10-CM

## 2024-07-25 ENCOUNTER — PATIENT MESSAGE (OUTPATIENT)
Dept: PSYCHIATRY | Facility: CLINIC | Age: 44
End: 2024-07-25
Payer: MEDICAID

## 2024-07-31 ENCOUNTER — TELEPHONE (OUTPATIENT)
Dept: NEUROLOGY | Facility: CLINIC | Age: 44
End: 2024-07-31
Payer: MEDICAID

## 2024-07-31 NOTE — TELEPHONE ENCOUNTER
Spoke with pt mom and scheduled her for labs on 8/1/2024 at 9:00AM. Pt was asking about hematology referral and appt being scheduled, Deisi and I stated to pt that referral would have to be updated to external and we will pass the message on to Anju or . Once referral is changed and we have received the number to the facility the referral would be faxed. Pt understood.

## 2024-08-01 ENCOUNTER — LAB VISIT (OUTPATIENT)
Dept: LAB | Facility: HOSPITAL | Age: 44
End: 2024-08-01
Attending: PSYCHIATRY & NEUROLOGY
Payer: MEDICAID

## 2024-08-01 ENCOUNTER — TELEPHONE (OUTPATIENT)
Dept: NEUROLOGY | Facility: CLINIC | Age: 44
End: 2024-08-01
Payer: MEDICAID

## 2024-08-01 DIAGNOSIS — D47.2 MGUS (MONOCLONAL GAMMOPATHY OF UNKNOWN SIGNIFICANCE): ICD-10-CM

## 2024-08-01 PROCEDURE — 36415 COLL VENOUS BLD VENIPUNCTURE: CPT

## 2024-08-01 PROCEDURE — 83521 IG LIGHT CHAINS FREE EACH: CPT

## 2024-08-01 NOTE — TELEPHONE ENCOUNTER
----- Message from Angelica Gongora sent at 7/31/2024  2:10 PM CDT -----  Regarding: Consult/Advisory  Contact: Mother @ [phone]  Consult/Advisory     Name Of Caller: Vicki Ch (mother)     Contact Preference:pt @ [phone]     Nature of call: mom is calling because pt need referral. Call [phone] to send referral

## 2024-08-02 LAB
KAPPA LC FREE SER NEPH-MCNC: 0.54 MG/DL (ref 0.33–1.94)
KAPPA LC FREE/LAMBDA FREE SER NEPH: 0.28 {RATIO} (ref 0.26–1.65)
LAMBDA LC FREE SER NEPH-MCNC: 1.94 MG/DL (ref 0.57–2.63)

## 2024-08-05 ENCOUNTER — PATIENT MESSAGE (OUTPATIENT)
Dept: PSYCHIATRY | Facility: CLINIC | Age: 44
End: 2024-08-05
Payer: MEDICAID

## 2024-08-05 ENCOUNTER — DOCUMENTATION ONLY (OUTPATIENT)
Dept: NEUROLOGY | Facility: CLINIC | Age: 44
End: 2024-08-05
Payer: MEDICAID

## 2024-08-05 ENCOUNTER — TELEPHONE (OUTPATIENT)
Dept: HEMATOLOGY/ONCOLOGY | Facility: CLINIC | Age: 44
End: 2024-08-05
Payer: MEDICAID

## 2024-08-05 DIAGNOSIS — D47.2 MGUS (MONOCLONAL GAMMOPATHY OF UNKNOWN SIGNIFICANCE): Primary | ICD-10-CM

## 2024-08-14 ENCOUNTER — LAB VISIT (OUTPATIENT)
Dept: LAB | Facility: HOSPITAL | Age: 44
End: 2024-08-14
Attending: PSYCHIATRY & NEUROLOGY
Payer: MEDICAID

## 2024-08-14 DIAGNOSIS — D47.2 MGUS (MONOCLONAL GAMMOPATHY OF UNKNOWN SIGNIFICANCE): ICD-10-CM

## 2024-08-14 LAB
ALBUMIN SERPL-MCNC: 3.7 G/DL (ref 3.5–5)
ALBUMIN/GLOB SERPL: 1 RATIO (ref 1.1–2)
ALP SERPL-CCNC: 116 UNIT/L (ref 40–150)
ALT SERPL-CCNC: 7 UNIT/L (ref 0–55)
ANION GAP SERPL CALC-SCNC: 10 MEQ/L
AST SERPL-CCNC: 9 UNIT/L (ref 5–34)
B2 MICROGLOB SERPL-MCNC: 1.55 MG/L (ref 0.97–2.64)
BASOPHILS # BLD AUTO: 0.03 X10(3)/MCL
BASOPHILS NFR BLD AUTO: 0.5 %
BILIRUB SERPL-MCNC: 0.2 MG/DL
BUN SERPL-MCNC: 11.5 MG/DL (ref 7–18.7)
CALCIUM SERPL-MCNC: 9.8 MG/DL (ref 8.4–10.2)
CHLORIDE SERPL-SCNC: 106 MMOL/L (ref 98–107)
CO2 SERPL-SCNC: 25 MMOL/L (ref 22–29)
CREAT SERPL-MCNC: 0.71 MG/DL (ref 0.55–1.02)
CREAT/UREA NIT SERPL: 16
CRP SERPL-MCNC: 10.9 MG/L
EOSINOPHIL # BLD AUTO: 0.2 X10(3)/MCL (ref 0–0.9)
EOSINOPHIL NFR BLD AUTO: 3.4 %
ERYTHROCYTE [DISTWIDTH] IN BLOOD BY AUTOMATED COUNT: 14.3 % (ref 11.5–17)
GFR SERPLBLD CREATININE-BSD FMLA CKD-EPI: >60 ML/MIN/1.73/M2
GLOBULIN SER-MCNC: 3.6 GM/DL (ref 2.4–3.5)
GLUCOSE SERPL-MCNC: 100 MG/DL (ref 74–100)
HCT VFR BLD AUTO: 34.4 % (ref 37–47)
HGB BLD-MCNC: 10.8 G/DL (ref 12–16)
IGA SERPL-MCNC: 1433 MG/DL (ref 65–421)
IGG SERPL-MCNC: 690 MG/DL (ref 522–1631)
IGM SERPL-MCNC: 47 MG/DL (ref 33–293)
IMM GRANULOCYTES # BLD AUTO: 0.02 X10(3)/MCL (ref 0–0.04)
IMM GRANULOCYTES NFR BLD AUTO: 0.3 %
LYMPHOCYTES # BLD AUTO: 1.17 X10(3)/MCL (ref 0.6–4.6)
LYMPHOCYTES NFR BLD AUTO: 19.8 %
MCH RBC QN AUTO: 28.3 PG (ref 27–31)
MCHC RBC AUTO-ENTMCNC: 31.4 G/DL (ref 33–36)
MCV RBC AUTO: 90.3 FL (ref 80–94)
MONOCYTES # BLD AUTO: 0.55 X10(3)/MCL (ref 0.1–1.3)
MONOCYTES NFR BLD AUTO: 9.3 %
NEUTROPHILS # BLD AUTO: 3.94 X10(3)/MCL (ref 2.1–9.2)
NEUTROPHILS NFR BLD AUTO: 66.7 %
NRBC BLD AUTO-RTO: 0 %
PLATELET # BLD AUTO: 298 X10(3)/MCL (ref 130–400)
PLATELETS.RETICULATED NFR BLD AUTO: 3 % (ref 0.9–11.2)
PMV BLD AUTO: 10.2 FL (ref 7.4–10.4)
POTASSIUM SERPL-SCNC: 4.3 MMOL/L (ref 3.5–5.1)
PROT SERPL-MCNC: 7.3 GM/DL (ref 6.4–8.3)
RBC # BLD AUTO: 3.81 X10(6)/MCL (ref 4.2–5.4)
SODIUM SERPL-SCNC: 141 MMOL/L (ref 136–145)
WBC # BLD AUTO: 5.91 X10(3)/MCL (ref 4.5–11.5)

## 2024-08-14 PROCEDURE — 80053 COMPREHEN METABOLIC PANEL: CPT

## 2024-08-14 PROCEDURE — 85025 COMPLETE CBC W/AUTO DIFF WBC: CPT

## 2024-08-14 PROCEDURE — 36415 COLL VENOUS BLD VENIPUNCTURE: CPT

## 2024-08-14 PROCEDURE — 86146 BETA-2 GLYCOPROTEIN ANTIBODY: CPT

## 2024-08-14 PROCEDURE — 84165 PROTEIN E-PHORESIS SERUM: CPT

## 2024-08-14 PROCEDURE — 83521 IG LIGHT CHAINS FREE EACH: CPT

## 2024-08-14 PROCEDURE — 82784 ASSAY IGA/IGD/IGG/IGM EACH: CPT

## 2024-08-14 PROCEDURE — 86140 C-REACTIVE PROTEIN: CPT

## 2024-08-15 LAB
ALBUMIN % SPEP (OHS): 46.56 (ref 48.1–59.5)
ALBUMIN SERPL-MCNC: 3.4 G/DL (ref 3.5–5)
ALBUMIN/GLOB SERPL: 0.9 RATIO (ref 1.1–2)
ALPHA 1 GLOB (OHS): 0.27 GM/DL (ref 0–0.4)
ALPHA 1 GLOB% (OHS): 3.76 (ref 2.3–4.9)
ALPHA 2 GLOB % (OHS): 11.4 (ref 6.9–13)
ALPHA 2 GLOB (OHS): 0.82 GM/DL (ref 0.4–1)
BETA GLOB (OHS): 1.98 GM/DL (ref 0.7–1.3)
BETA GLOB% (OHS): 27.49 (ref 13.8–19.7)
GAMMA GLOBULIN % (OHS): 10.79 (ref 10.1–21.9)
GAMMA GLOBULIN (OHS): 0.78 GM/DL (ref 0.4–1.8)
GLOBULIN SER-MCNC: 3.8 GM/DL (ref 2.4–3.5)
KAPPA LC FREE SER NEPH-MCNC: 0.63 MG/DL (ref 0.33–1.94)
KAPPA LC FREE/LAMBDA FREE SER NEPH: 0.34 {RATIO} (ref 0.26–1.65)
LAMBDA LC FREE SER NEPH-MCNC: 1.87 MG/DL (ref 0.57–2.63)
M SPIKE % (OHS): 7.24
M SPIKE (OHS): 0.52 GM/DL
PATH REV: NORMAL
PROT SERPL-MCNC: 7.2 GM/DL (ref 6.4–8.3)

## 2024-08-16 ENCOUNTER — PATIENT MESSAGE (OUTPATIENT)
Dept: NEUROLOGY | Facility: CLINIC | Age: 44
End: 2024-08-16
Payer: MEDICAID

## 2024-09-03 ENCOUNTER — OFFICE VISIT (OUTPATIENT)
Dept: HEMATOLOGY/ONCOLOGY | Facility: CLINIC | Age: 44
End: 2024-09-03
Payer: MEDICAID

## 2024-09-03 DIAGNOSIS — D47.2 MGUS (MONOCLONAL GAMMOPATHY OF UNKNOWN SIGNIFICANCE): ICD-10-CM

## 2024-09-03 DIAGNOSIS — D50.9 IRON DEFICIENCY ANEMIA, UNSPECIFIED IRON DEFICIENCY ANEMIA TYPE: Primary | ICD-10-CM

## 2024-09-03 PROCEDURE — 99214 OFFICE O/P EST MOD 30 MIN: CPT | Mod: 95,,, | Performed by: INTERNAL MEDICINE

## 2024-09-03 PROCEDURE — G2211 COMPLEX E/M VISIT ADD ON: HCPCS | Mod: 95,,, | Performed by: INTERNAL MEDICINE

## 2024-09-03 NOTE — Clinical Note
-iron, tibc, ferritin lab in [place] this week  -cbc, cmp, serum free light chains, quantitative immunoglobulins, serum electropheresis, serum immunofixation lab in [place] in 1 year -MD appt 1 week after labs in 1 year

## 2024-09-03 NOTE — PROGRESS NOTES
The patient location is: home  The chief complaint leading to consultation is: elevated IgA level, anemia    Visit type: audiovisual    Face to Face time with patient: 15   30  minutes of total time spent on the encounter, which includes face to face time and non-face to face time preparing to see the patient (eg, review of tests), Obtaining and/or reviewing separately obtained history, Documenting clinical information in the electronic or other health record, Independently interpreting results (not separately reported) and communicating results to the patient/family/caregiver, or Care coordination (not separately reported).         Each patient to whom he or she provides medical services by telemedicine is:  (1) informed of the relationship between the physician and patient and the respective role of any other health care provider with respect to management of the patient; and (2) notified that he or she may decline to receive medical services by telemedicine and may withdraw from such care at any time.    Notes:     SECTION OF HEMATOLOGY AND BONE MARROW TRANSPLANT  Return    Patient Visit   09/09/2024  Referred by:  No ref. provider found  Referred for: elevated IgA level, anemia    CHIEF COMPLAINT: No chief complaint on file.      HISTORY OF PRESENT ILLNESS:   44 y.o.female; pmh of RR MS on ocrevus; referred by neurology for elevated IgA level.    Also pt notes long standing history of anemia.  Denies fever, chills, nightsweats, bleeding, brusing, lymphadenopathy, signs/symptoms of splenomegaly, focal pain, SOB, chest pain, neuropathy, fatigue  Mother present with pt today.      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  Presents for surveillance visit for this.   Accompanied by mother. They live in [place].  PAST MEDICAL HISTORY:   Past Medical History:   Diagnosis Date    Multiple sclerosis        PAST SURGICAL HISTORY:   Past Surgical History:   Procedure Laterality Date    ANTERIOR CRUCIATE LIGAMENT  REPAIR  1998    right knee    BRAIN BIOPSY  early 2000's       PAST SOCIAL HISTORY:   reports that she has never smoked. She has never used smokeless tobacco. She reports that she does not drink alcohol and does not use drugs.    FAMILY HISTORY:  Family History   Problem Relation Name Age of Onset    Hypertension Mother      Stomach cancer Father      Kidney disease Paternal Grandmother      Diabetes Paternal Grandmother      Heart disease Paternal Grandmother      Colon cancer Paternal Grandfather         CURRENT MEDICATIONS:   Current Outpatient Medications   Medication Sig    acetaminophen (TYLENOL) 325 MG tablet Take 325 mg by mouth every 6 (six) hours as needed for Pain.    CALCIUM CARBONATE/VITAMIN D3 (VITAMIN D-3 ORAL) Take 5,000 Units by mouth once daily.     ocrelizumab (OCREVUS) 30 mg/mL Soln Infuse Ocrevus 600mg in 500mL of 0.9% NaCl IV every 24 weeks. Pre-meds: Solumedrol 100mg IVPB, Benadryl 50mg IVP, Tylenol 1000mg PO, Pepcid 20mg IVP. See attached protocol for dosing rate and infusion duration.     No current facility-administered medications for this visit.     ALLERGIES:   Review of patient's allergies indicates:  No Known Allergies          REVIEW OF SYSTEMS:   See HPI     PHYSICAL EXAM:   physical exam deferred due to telemed      ECOG Performance Status: (foot note - ECOG PS provided by Eastern Cooperative Oncology Group) 1 - Symptomatic but completely ambulatory    Karnofsky Performance Score:  80%- Normal Activity with Effort: Some Symptoms of Disease  DATA:   Lab Results   Component Value Date    WBC 5.91 08/14/2024    HGB 10.8 (L) 08/14/2024    HCT 34.4 (L) 08/14/2024    MCV 90.3 08/14/2024     08/14/2024       Gran # (ANC)   Date Value Ref Range Status   09/19/2019 3.3 1.8 - 7.7 K/uL Final     Gran %   Date Value Ref Range Status   09/19/2019 61.8 38.0 - 73.0 % Final     Lymph #   Date Value Ref Range Status   09/19/2019 1.1 1.0 - 4.8 K/uL Final     Lymph %   Date Value Ref Range  Status   09/19/2019 21.1 18.0 - 48.0 % Final     CMP  Sodium   Date Value Ref Range Status   08/14/2024 141 136 - 145 mmol/L Final   09/17/2018 139 136 - 145 mmol/L Final     Potassium   Date Value Ref Range Status   08/14/2024 4.3 3.5 - 5.1 mmol/L Final   09/17/2018 4.7 3.5 - 5.1 mmol/L Final     Chloride   Date Value Ref Range Status   08/14/2024 106 98 - 107 mmol/L Final   09/17/2018 105 95 - 110 mmol/L Final     CO2   Date Value Ref Range Status   08/14/2024 25 22 - 29 mmol/L Final   09/17/2018 25 23 - 29 mmol/L Final     Glucose   Date Value Ref Range Status   09/17/2018 82 70 - 110 mg/dL Final     BUN   Date Value Ref Range Status   09/17/2018 10 6 - 20 mg/dL Final     Blood Urea Nitrogen   Date Value Ref Range Status   08/14/2024 11.5 7.0 - 18.7 mg/dL Final     Creatinine   Date Value Ref Range Status   08/14/2024 0.71 0.55 - 1.02 mg/dL Final   09/17/2018 0.6 0.5 - 1.4 mg/dL Final     Calcium   Date Value Ref Range Status   08/14/2024 9.8 8.4 - 10.2 mg/dL Final   09/17/2018 9.9 8.7 - 10.5 mg/dL Final     Total Protein   Date Value Ref Range Status   09/17/2018 8.0 6.0 - 8.4 g/dL Final     Albumin   Date Value Ref Range Status   08/14/2024 3.7 3.5 - 5.0 g/dL Final   08/14/2024 3.4 (L) 3.5 - 5.0 g/dL Final   09/17/2018 4.0 3.5 - 5.2 g/dL Final     Total Bilirubin   Date Value Ref Range Status   09/17/2018 0.5 0.1 - 1.0 mg/dL Final     Comment:     For infants and newborns, interpretation of results should be based  on gestational age, weight and in agreement with clinical  observations.  Premature Infant recommended reference ranges:  Up to 24 hours.............<8.0 mg/dL  Up to 48 hours............<12.0 mg/dL  3-5 days..................<15.0 mg/dL  6-29 days.................<15.0 mg/dL       Bilirubin Total   Date Value Ref Range Status   08/14/2024 0.2 <=1.5 mg/dL Final     Alkaline Phosphatase   Date Value Ref Range Status   09/17/2018 90 55 - 135 U/L Final     ALP   Date Value Ref Range Status   08/14/2024  116 40 - 150 unit/L Final     AST   Date Value Ref Range Status   08/14/2024 9 5 - 34 unit/L Final   09/17/2018 12 10 - 40 U/L Final     ALT   Date Value Ref Range Status   08/14/2024 7 0 - 55 unit/L Final   09/17/2018 8 (L) 10 - 44 U/L Final     Anion Gap   Date Value Ref Range Status   09/17/2018 9 8 - 16 mmol/L Final     eGFR if    Date Value Ref Range Status   09/17/2018 >60.0 >60 mL/min/1.73 m^2 Final     eGFR if non    Date Value Ref Range Status   09/17/2018 >60.0 >60 mL/min/1.73 m^2 Final     Comment:     Calculation used to obtain the estimated glomerular filtration  rate (eGFR) is the CKD-EPI equation.        Lab Results   Component Value Date    TSH 1.066 04/09/2018     IgM Level   Date Value Ref Range Status   08/14/2024 47.0 33.0 - 293.0 mg/dL Final     Component 3 wk ago -8/14/24    Pathology Review SPEP: A M-spike is detected in the beta region (0.52 g/dL).    BLANCA: A band is present in the IgA berenice with a corresponding band in the lambda berenice.    The immunofixation electrophoresis are consistent with monoclonal gammopathy of IgA-lambda isotype.       ASSESSMENT AND PLAN:   Encounter Diagnoses   Name Primary?    Iron deficiency anemia, unspecified iron deficiency anemia type Yes    MGUS (monoclonal gammopathy of unknown significance)        -she has likely  IgA MGUS  diagnosed in July 2022   -mild chronic anemia likely unrelated to disease and from [facility] and ACD  -pt declined marrow biopsy at diagnosis  -she has no other CRAB or other signs symptoms to suggest progression to active myeloma  -stability of mprotein/IgA levels over last 2 years Is encouraging  -August 2024 iron studies not cw with [facility]  -continue annual cbc, cmp, serum free light chains, quantitative immunoglobulins, serum electropheresis, serum immunofixation and provider appt  -fu with neurology as disrected for RRMS  -educated on symptoms for which to seek attn in our clinic earlier       Follow Up: -cbc,  cmp, serum free light chains, quantitative immunoglobulins, serum electropheresis, serum immunofixation, iron, tibc, ferritin lab in [place] in 1 year  -MD appt 1 week after labs

## 2024-09-06 ENCOUNTER — LAB VISIT (OUTPATIENT)
Dept: LAB | Facility: HOSPITAL | Age: 44
End: 2024-09-06
Attending: INTERNAL MEDICINE
Payer: MEDICAID

## 2024-09-06 DIAGNOSIS — D50.9 IRON DEFICIENCY ANEMIA, UNSPECIFIED IRON DEFICIENCY ANEMIA TYPE: ICD-10-CM

## 2024-09-06 LAB
FERRITIN SERPL-MCNC: 55.08 NG/ML (ref 4.63–204)
IRON SATN MFR SERPL: 18 % (ref 20–50)
IRON SERPL-MCNC: 45 UG/DL (ref 50–170)
TIBC SERPL-MCNC: 200 UG/DL (ref 70–310)
TIBC SERPL-MCNC: 245 UG/DL (ref 250–450)
TRANSFERRIN SERPL-MCNC: 233 MG/DL (ref 180–382)

## 2024-09-06 PROCEDURE — 83540 ASSAY OF IRON: CPT

## 2024-09-06 PROCEDURE — 36415 COLL VENOUS BLD VENIPUNCTURE: CPT

## 2024-09-06 PROCEDURE — 82728 ASSAY OF FERRITIN: CPT

## 2024-10-02 ENCOUNTER — INFUSION (OUTPATIENT)
Dept: INFUSION THERAPY | Facility: HOSPITAL | Age: 44
End: 2024-10-02
Attending: PSYCHIATRY & NEUROLOGY
Payer: MEDICAID

## 2024-10-02 VITALS
SYSTOLIC BLOOD PRESSURE: 115 MMHG | HEART RATE: 103 BPM | DIASTOLIC BLOOD PRESSURE: 74 MMHG | RESPIRATION RATE: 18 BRPM | TEMPERATURE: 97 F

## 2024-10-02 DIAGNOSIS — G35 MS (MULTIPLE SCLEROSIS): Primary | ICD-10-CM

## 2024-10-02 PROCEDURE — 25000003 PHARM REV CODE 250: Performed by: CLINICAL NURSE SPECIALIST

## 2024-10-02 PROCEDURE — 96375 TX/PRO/DX INJ NEW DRUG ADDON: CPT

## 2024-10-02 PROCEDURE — 96366 THER/PROPH/DIAG IV INF ADDON: CPT

## 2024-10-02 PROCEDURE — 96365 THER/PROPH/DIAG IV INF INIT: CPT

## 2024-10-02 PROCEDURE — 63600175 PHARM REV CODE 636 W HCPCS: Performed by: CLINICAL NURSE SPECIALIST

## 2024-10-02 RX ORDER — DIPHENHYDRAMINE HYDROCHLORIDE 50 MG/ML
50 INJECTION INTRAMUSCULAR; INTRAVENOUS
OUTPATIENT
Start: 2025-01-15

## 2024-10-02 RX ORDER — DIPHENHYDRAMINE HYDROCHLORIDE 50 MG/ML
50 INJECTION INTRAMUSCULAR; INTRAVENOUS
Status: COMPLETED | OUTPATIENT
Start: 2024-10-02 | End: 2024-10-02

## 2024-10-02 RX ORDER — DIPHENHYDRAMINE HYDROCHLORIDE 50 MG/ML
50 INJECTION INTRAMUSCULAR; INTRAVENOUS
Status: DISCONTINUED | OUTPATIENT
Start: 2024-10-02 | End: 2024-10-02 | Stop reason: HOSPADM

## 2024-10-02 RX ORDER — SODIUM CHLORIDE 0.9 % (FLUSH) 0.9 %
10 SYRINGE (ML) INJECTION
OUTPATIENT
Start: 2025-01-15

## 2024-10-02 RX ORDER — ACETAMINOPHEN 500 MG
1000 TABLET ORAL
Status: COMPLETED | OUTPATIENT
Start: 2024-10-02 | End: 2024-10-02

## 2024-10-02 RX ORDER — EPINEPHRINE 0.3 MG/.3ML
0.3 INJECTION SUBCUTANEOUS
Status: DISCONTINUED | OUTPATIENT
Start: 2024-10-02 | End: 2024-10-02 | Stop reason: HOSPADM

## 2024-10-02 RX ORDER — EPINEPHRINE 0.3 MG/.3ML
0.3 INJECTION SUBCUTANEOUS
OUTPATIENT
Start: 2025-01-15

## 2024-10-02 RX ORDER — FAMOTIDINE 10 MG/ML
20 INJECTION INTRAVENOUS
Status: COMPLETED | OUTPATIENT
Start: 2024-10-02 | End: 2024-10-02

## 2024-10-02 RX ORDER — HEPARIN 100 UNIT/ML
500 SYRINGE INTRAVENOUS
OUTPATIENT
Start: 2025-01-15

## 2024-10-02 RX ORDER — FAMOTIDINE 10 MG/ML
20 INJECTION INTRAVENOUS
OUTPATIENT
Start: 2025-01-15

## 2024-10-02 RX ORDER — METHYLPREDNISOLONE SOD SUCC 125 MG
100 VIAL (EA) INJECTION
Status: COMPLETED | OUTPATIENT
Start: 2024-10-02 | End: 2024-10-02

## 2024-10-02 RX ORDER — DIPHENHYDRAMINE HYDROCHLORIDE 50 MG/ML
50 INJECTION INTRAMUSCULAR; INTRAVENOUS
Start: 2025-01-15

## 2024-10-02 RX ORDER — HEPARIN 100 UNIT/ML
500 SYRINGE INTRAVENOUS
Status: DISCONTINUED | OUTPATIENT
Start: 2024-10-02 | End: 2024-10-02 | Stop reason: HOSPADM

## 2024-10-02 RX ORDER — SODIUM CHLORIDE 0.9 % (FLUSH) 0.9 %
10 SYRINGE (ML) INJECTION
Status: DISCONTINUED | OUTPATIENT
Start: 2024-10-02 | End: 2024-10-02 | Stop reason: HOSPADM

## 2024-10-02 RX ORDER — ACETAMINOPHEN 500 MG
1000 TABLET ORAL
OUTPATIENT
Start: 2025-01-15

## 2024-10-02 RX ORDER — METHYLPREDNISOLONE SOD SUCC 125 MG
100 VIAL (EA) INJECTION
Start: 2025-01-15

## 2024-10-02 RX ADMIN — OCRELIZUMAB 600 MG: 300 INJECTION INTRAVENOUS at 09:10

## 2024-10-02 RX ADMIN — ACETAMINOPHEN 1000 MG: 500 TABLET ORAL at 09:10

## 2024-10-02 RX ADMIN — FAMOTIDINE 20 MG: 10 INJECTION, SOLUTION INTRAVENOUS at 09:10

## 2024-10-02 RX ADMIN — DIPHENHYDRAMINE HYDROCHLORIDE 50 MG: 50 INJECTION INTRAMUSCULAR; INTRAVENOUS at 09:10

## 2024-10-02 RX ADMIN — METHYLPREDNISOLONE SODIUM SUCCINATE 100 MG: 125 INJECTION, POWDER, FOR SOLUTION INTRAMUSCULAR; INTRAVENOUS at 09:10

## 2024-10-22 ENCOUNTER — HOSPITAL ENCOUNTER (OUTPATIENT)
Dept: RADIOLOGY | Facility: HOSPITAL | Age: 44
Discharge: HOME | End: 2024-10-22
Attending: PSYCHIATRY & NEUROLOGY
Payer: MEDICAID

## 2024-10-22 ENCOUNTER — OFFICE VISIT (OUTPATIENT)
Dept: NEUROLOGY | Facility: CLINIC | Age: 44
End: 2024-10-22
Payer: MEDICAID

## 2024-10-22 VITALS
BODY MASS INDEX: 42.04 KG/M2 | SYSTOLIC BLOOD PRESSURE: 126 MMHG | DIASTOLIC BLOOD PRESSURE: 84 MMHG | HEIGHT: 61 IN | HEART RATE: 84 BPM | WEIGHT: 222.69 LBS

## 2024-10-22 DIAGNOSIS — Z29.89 PROPHYLACTIC IMMUNOTHERAPY: ICD-10-CM

## 2024-10-22 DIAGNOSIS — Z79.899 HIGH RISK MEDICATION USE: ICD-10-CM

## 2024-10-22 DIAGNOSIS — G35 MS (MULTIPLE SCLEROSIS): ICD-10-CM

## 2024-10-22 DIAGNOSIS — R26.9 GAIT DISTURBANCE: ICD-10-CM

## 2024-10-22 DIAGNOSIS — G35 MULTIPLE SCLEROSIS: Primary | ICD-10-CM

## 2024-10-22 DIAGNOSIS — Z71.89 COUNSELING REGARDING GOALS OF CARE: ICD-10-CM

## 2024-10-22 PROCEDURE — 3008F BODY MASS INDEX DOCD: CPT | Mod: CPTII,,, | Performed by: CLINICAL NURSE SPECIALIST

## 2024-10-22 PROCEDURE — 99999 PR PBB SHADOW E&M-EST. PATIENT-LVL III: CPT | Mod: PBBFAC,,, | Performed by: CLINICAL NURSE SPECIALIST

## 2024-10-22 PROCEDURE — 72141 MRI NECK SPINE W/O DYE: CPT | Mod: 26,,, | Performed by: RADIOLOGY

## 2024-10-22 PROCEDURE — 99215 OFFICE O/P EST HI 40 MIN: CPT | Mod: S$PBB,,, | Performed by: CLINICAL NURSE SPECIALIST

## 2024-10-22 PROCEDURE — 1159F MED LIST DOCD IN RCRD: CPT | Mod: CPTII,,, | Performed by: CLINICAL NURSE SPECIALIST

## 2024-10-22 PROCEDURE — 99213 OFFICE O/P EST LOW 20 MIN: CPT | Mod: PBBFAC,25 | Performed by: CLINICAL NURSE SPECIALIST

## 2024-10-22 PROCEDURE — 70551 MRI BRAIN STEM W/O DYE: CPT | Mod: TC

## 2024-10-22 PROCEDURE — 72141 MRI NECK SPINE W/O DYE: CPT | Mod: TC

## 2024-10-22 PROCEDURE — 3079F DIAST BP 80-89 MM HG: CPT | Mod: CPTII,,, | Performed by: CLINICAL NURSE SPECIALIST

## 2024-10-22 PROCEDURE — 3074F SYST BP LT 130 MM HG: CPT | Mod: CPTII,,, | Performed by: CLINICAL NURSE SPECIALIST

## 2024-10-22 PROCEDURE — 70551 MRI BRAIN STEM W/O DYE: CPT | Mod: 26,,, | Performed by: RADIOLOGY

## 2024-10-22 PROCEDURE — G2211 COMPLEX E/M VISIT ADD ON: HCPCS | Mod: S$PBB,,, | Performed by: CLINICAL NURSE SPECIALIST

## 2024-10-22 NOTE — PROGRESS NOTES
Subjective:          Patient ID: Bettye Ch is a 44 y.o. female who presents today for a routine clinic visit for MS.  She was last seen in April 2024. The history has been provided by the patient.     MS HPI:  DMT: Ocrevus--last infused in October; due in April 2025   Side effects from DMT? No  Taking vitamin D3 as recommended? Yes  She denies any recent infections.   She has followed up with Dr. Malone for MGUS.   She takes walks, but is not actively exercising.   She feels stable from an MS standpoint.     Medications:  Current Outpatient Medications   Medication Sig    acetaminophen (TYLENOL) 325 MG tablet Take 325 mg by mouth every 6 (six) hours as needed for Pain.    CALCIUM CARBONATE/VITAMIN D3 (VITAMIN D-3 ORAL) Take 5,000 Units by mouth once daily.     ocrelizumab (OCREVUS) 30 mg/mL Soln Infuse Ocrevus 600mg in 500mL of 0.9% NaCl IV every 24 weeks. Pre-meds: Solumedrol 100mg IVPB, Benadryl 50mg IVP, Tylenol 1000mg PO, Pepcid 20mg IVP. See attached protocol for dosing rate and infusion duration.     No current facility-administered medications for this visit.       SOCIAL HISTORY  Social History     Tobacco Use    Smoking status: Never    Smokeless tobacco: Never   Substance Use Topics    Alcohol use: No    Drug use: No       Living arrangements - the patient lives with family     ROS:      10/22/24    REVIEW OF SYMPTOMS   Do you feel abnormally tired on most days? No    Do you feel you generally sleep well? Yes --tends to stay up late    Do you have difficulty controlling your bladder?  No    Do you have difficulty controlling your bowels?  No    Do you have frequent muscle cramps, tightness or spasms in your limbs?  No    Do you have new visual symptoms?  No --does not wear glasses    Do you have worsening difficulty with your memory or thinking? No --she has no concerns    Do you have worsening symptoms of anxiety or depression?  No    For patients who walk, Do you have more difficulty  walking?  No    Have you fallen since your last visit?  No    For patients who use wheelchairs: Do you have any skin wounds or breakdown? No    Do you have difficulty using your hands?  No    Do you have shooting or burning pain? No    Do you have difficulty with sexual function?  Not assessed    If you are sexually active, are you using birth control? Y/N  N/A No    Do you often choke when swallowing liquids or solid food?  No    Do you experience worsening symptoms when overheated? Yes--avoids the heat    Do you need any new equipment such as a wheelchair, walker or shower chair? No    Do you receive co-pay financial assistance for your principal MS medicine? No    Would you be interested in participating in an MS research trial in the future? No    For patients on Gilenya, Tecfidera, Aubagio, Rituxan, Ocrevus, Tysabri, Lemtrada or Methotrexate, are you aware that you should NOT receive live virus vaccines?  Yes    Do you feel you have adequate family/friend support?  Yes    Do you have health insurance?   Yes    Are you currently employed? No    Do you receive SSDI/SSI?  No    Do you use marijuana or cannabis products? No    Have you been diagnosed with a urinary tract infection since your last visit here? No    Have you been diagnosed with a respiratory tract infection since your last visit here? No    Have you been to the emergency room since your last visit here? No    Have you been hospitalized since your last visit here?  No        Patient-reported                Objective:        1. 25 foot timed walk:      10/30/2023     2:02 PM 10/22/2024     2:08 PM   Timed 25 Foot Walk:   Did patient wear an AFO? No No   Was assistive device used? No No   Time for 25 Foot Walk (seconds) 7.9 7.9   Time for 25 Foot Walk (seconds) 6.9 7.9       Neurological Exam    Mental Status   Oriented to person, place, and time.   Attention: normal. Concentration: normal.   Speech: speech is normal   Level of consciousness:  alert  Knowledge: good.   Normal comprehension.        Cranial Nerves         CN III, IV, VI   Extraocular motions are normal.   Right pupil: Shape: regular.   Left pupil: Shape: regular.   Nystagmus: none      CN V   Right facial sensation deficit: none  Left facial sensation deficit: none     CN VII   Right facial weakness: none  Left facial weakness: none     CN VIII   Hearing: intact     CN IX, X   Palate: symmetric     CN XI   Right sternocleidomastoid strength: normal  Left sternocleidomastoid strength: normal  Right trapezius strength: normal  Left trapezius strength: normal     CN XII   Tongue deviation: none        Motor Exam      Strength   Right neck flexion: 5/5  Left neck flexion: 5/5  Right neck extension: 5/5  Left neck extension: 5/5  Right deltoid: 5/5  Left deltoid: 5/5  Right biceps: 5/5  Left biceps: 5/5  Right triceps: 5/5  Left triceps: 5/5  Right wrist flexion: 5/5  Left wrist flexion: 5/5  Right wrist extension: 5/5  Left wrist extension: 5/5  Right interossei: 5/5  Left interossei: 5/5  Right iliopsoas: 5/5  Left iliopsoas: 5/5  Right quadriceps: 5/5  Left quadriceps: 5/5  Right hamstrin/5  Left hamstrin/5  Right anterior tibial: 5/5  Left anterior tibial: 5/5  Right gastroc: 5/5  Left gastroc: 5/5           Sensory Exam   Right arm vibration: normal  Left arm vibration: normal  Right leg vibration: normal  Left leg vibration: normal    Gait, Coordination, and Reflexes      Gait  Gait: wide-based, slightly shuffling      Coordination   Romberg: negative  Finger to nose coordination: abnormal (mild dysmetria)  Heel to shin coordination: abnormal  Tandem walking coordination: abnormal (unable to put one foot in front of the other)     Reflexes   Right brachioradialis: 2+  Left brachioradialis: 2+  Right biceps: 2+  Left biceps: 2+  Right triceps: 2+  Left triceps: 2+  Right patellar: 2+  Left patellar: 2+  Right achilles: 2+  Left achilles: 2+  Right plantar: normal  Left plantar:  normal     RSM very slightly slower in left hand compared to right.       Imaging:     Results for orders placed during the hospital encounter of 10/30/23    MRI Brain Demyelinating Without Contrast    Impression  Brain appears stable from prior exam, again demonstrating findings compatible with the reported history of multiple sclerosis.  No new discrete lesions to indicate ongoing demyelination.      Electronically signed by: Miguel A Sims MD  Date:    10/30/2023  Time:    11:30          Labs:     Lab Results   Component Value Date    ASVBOBUY52NJ 22.7 (L) 01/08/2024    [id number] 27 (L) 09/19/2019    ELLYICVE14AX 60 03/20/2019     Lab Results   Component Value Date    JCVINDEX SEE COMMENT (A) 03/24/2015    JCVANTIBODY SEE COMMENT 03/24/2015     Lab Results   Component Value Date    UJ2NRFBR 72.6 03/15/2017    ABSOLUTECD3 1472 03/15/2017    KZ7MEFXM 18.1 03/15/2017    ABSOLUTECD8 367 03/15/2017    RD8OWGOJ 52.4 03/15/2017    ABSOLUTECD4 1062 03/15/2017    LABCD48 2.89 03/15/2017     Lab Results   Component Value Date    WBC 5.91 08/14/2024    RBC 3.81 (L) 08/14/2024    HGB 10.8 (L) 08/14/2024    HCT 34.4 (L) 08/14/2024    MCV 90.3 08/14/2024    MCH 28.3 08/14/2024    MCHC 31.4 (L) 08/14/2024    RDW 14.3 08/14/2024     08/14/2024    MPV 10.2 08/14/2024    GRAN 3.3 09/19/2019    GRAN 61.8 09/19/2019    LYMPH 1.1 09/19/2019    LYMPH 21.1 09/19/2019    MONO 0.7 09/19/2019    MONO 12.5 09/19/2019    EOS 0.2 09/19/2019    BASO 0.04 09/19/2019    EOSINOPHIL 3.6 09/19/2019    BASOPHIL 0.8 09/19/2019     Sodium   Date Value Ref Range Status   08/14/2024 141 136 - 145 mmol/L Final   09/17/2018 139 136 - 145 mmol/L Final     Potassium   Date Value Ref Range Status   08/14/2024 4.3 3.5 - 5.1 mmol/L Final   09/17/2018 4.7 3.5 - 5.1 mmol/L Final     Chloride   Date Value Ref Range Status   08/14/2024 106 98 - 107 mmol/L Final   09/17/2018 105 95 - 110 mmol/L Final     CO2   Date Value Ref Range Status   08/14/2024 25  22 - 29 mmol/L Final   09/17/2018 25 23 - 29 mmol/L Final     Glucose   Date Value Ref Range Status   09/17/2018 82 70 - 110 mg/dL Final     BUN   Date Value Ref Range Status   09/17/2018 10 6 - 20 mg/dL Final     Blood Urea Nitrogen   Date Value Ref Range Status   08/14/2024 11.5 7.0 - 18.7 mg/dL Final     Creatinine   Date Value Ref Range Status   08/14/2024 0.71 0.55 - 1.02 mg/dL Final   09/17/2018 0.6 0.5 - 1.4 mg/dL Final     Calcium   Date Value Ref Range Status   08/14/2024 9.8 8.4 - 10.2 mg/dL Final   09/17/2018 9.9 8.7 - 10.5 mg/dL Final     Total Protein   Date Value Ref Range Status   09/17/2018 8.0 6.0 - 8.4 g/dL Final     Albumin   Date Value Ref Range Status   08/14/2024 3.7 3.5 - 5.0 g/dL Final   08/14/2024 3.4 (L) 3.5 - 5.0 g/dL Final   09/17/2018 4.0 3.5 - 5.2 g/dL Final     Total Bilirubin   Date Value Ref Range Status   09/17/2018 0.5 0.1 - 1.0 mg/dL Final     Comment:     For infants and newborns, interpretation of results should be based  on gestational age, weight and in agreement with clinical  observations.  Premature Infant recommended reference ranges:  Up to 24 hours.............<8.0 mg/dL  Up to 48 hours............<12.0 mg/dL  3-5 days..................<15.0 mg/dL  6-29 days.................<15.0 mg/dL       Bilirubin Total   Date Value Ref Range Status   08/14/2024 0.2 <=1.5 mg/dL Final     Alkaline Phosphatase   Date Value Ref Range Status   09/17/2018 90 55 - 135 U/L Final     ALP   Date Value Ref Range Status   08/14/2024 116 40 - 150 unit/L Final     AST   Date Value Ref Range Status   08/14/2024 9 5 - 34 unit/L Final   09/17/2018 12 10 - 40 U/L Final     ALT   Date Value Ref Range Status   08/14/2024 7 0 - 55 unit/L Final   09/17/2018 8 (L) 10 - 44 U/L Final     Anion Gap   Date Value Ref Range Status   09/17/2018 9 8 - 16 mmol/L Final     eGFR if    Date Value Ref Range Status   09/17/2018 >60.0 >60 mL/min/1.73 m^2 Final     eGFR if non    Date  Value Ref Range Status   09/17/2018 >60.0 >60 mL/min/1.73 m^2 Final     Comment:     Calculation used to obtain the estimated glomerular filtration  rate (eGFR) is the CKD-EPI equation.        Lab Results   Component Value Date    HEPBSAG Nonreactive 07/08/2024    HEPBSAB Reactive (A) 06/21/2022    HEPBCAB Nonreactive 07/08/2024       MS Impression and Plan:     NEURO MULTIPLE SCLEROSIS IMPRESSION:   Number of relapses in the past year?:  0  Clinical Progression comment:  Walk time is one second slower, but remainder of exam is stable. We discussed importance of exercise to improve endurance, and we discussed Mediterranean diet and healthy eating. Her family reports that she eats a lot of sugar.   MRI Progression:  Stable  MRI Progression comment:  Images appear stable compared to prior, but final read is pending from radiology.   DMT:  No change in management  DMT comment:  Continue Ocrevus and Vitamin D. Her next infusion is due in April. We will check safety labs in March. She is aware of the risks associated with immunosuppressant therapy, including increased risk of infection.     Symptom Management:  No change in symptom management     MRI brain in October 2025  She will follow up with me virtually in 6 months.   The visit today is associated with current or anticipated ongoing medical care related to this patient's single serious condition/complex condition of multiple sclerosis.      She should follow up with Dr. Malone in September.       Total time spent with patient: 31 minutes   Total time spent on encounter: 40 minutes         LAMAR Orellana, CNS    Problem List Items Addressed This Visit       Prophylactic immunotherapy    Gait disturbance     Other Visit Diagnoses       Multiple sclerosis    -  Primary    Relevant Orders    CBC Auto Differential    Comprehensive Metabolic Panel    Hepatitis B Core Antibody, Total    Hepatitis B Surface Antigen    Immunoglobulins (IgG, IgA, IgM) Quantitative     Vitamin D    Counseling regarding goals of care        High risk medication use

## 2024-12-16 ENCOUNTER — PATIENT MESSAGE (OUTPATIENT)
Dept: PSYCHIATRY | Facility: CLINIC | Age: 44
End: 2024-12-16
Payer: MEDICAID

## 2025-03-07 ENCOUNTER — PATIENT MESSAGE (OUTPATIENT)
Dept: PSYCHIATRY | Facility: CLINIC | Age: 45
End: 2025-03-07
Payer: COMMERCIAL

## 2025-03-21 ENCOUNTER — LAB VISIT (OUTPATIENT)
Dept: LAB | Facility: HOSPITAL | Age: 45
End: 2025-03-21
Attending: CLINICAL NURSE SPECIALIST
Payer: COMMERCIAL

## 2025-03-21 DIAGNOSIS — G35 MULTIPLE SCLEROSIS: ICD-10-CM

## 2025-03-21 LAB
25(OH)D3+25(OH)D2 SERPL-MCNC: 50 NG/ML (ref 30–80)
ALBUMIN SERPL-MCNC: 3.6 G/DL (ref 3.5–5)
ALBUMIN/GLOB SERPL: 0.9 RATIO (ref 1.1–2)
ALP SERPL-CCNC: 112 UNIT/L (ref 40–150)
ALT SERPL-CCNC: 7 UNIT/L (ref 0–55)
ANION GAP SERPL CALC-SCNC: 9 MEQ/L
AST SERPL-CCNC: 11 UNIT/L (ref 11–45)
BASOPHILS # BLD AUTO: 0.03 X10(3)/MCL
BASOPHILS NFR BLD AUTO: 0.4 %
BILIRUB SERPL-MCNC: 0.2 MG/DL
BUN SERPL-MCNC: 11.5 MG/DL (ref 7–18.7)
CALCIUM SERPL-MCNC: 9.1 MG/DL (ref 8.4–10.2)
CHLORIDE SERPL-SCNC: 105 MMOL/L (ref 98–107)
CO2 SERPL-SCNC: 27 MMOL/L (ref 22–29)
CREAT SERPL-MCNC: 0.62 MG/DL (ref 0.55–1.02)
CREAT/UREA NIT SERPL: 19
EOSINOPHIL # BLD AUTO: 0.28 X10(3)/MCL (ref 0–0.9)
EOSINOPHIL NFR BLD AUTO: 4.2 %
ERYTHROCYTE [DISTWIDTH] IN BLOOD BY AUTOMATED COUNT: 14.5 % (ref 11.5–17)
GFR SERPLBLD CREATININE-BSD FMLA CKD-EPI: >60 ML/MIN/1.73/M2
GLOBULIN SER-MCNC: 3.9 GM/DL (ref 2.4–3.5)
GLUCOSE SERPL-MCNC: 116 MG/DL (ref 74–100)
HCT VFR BLD AUTO: 34.4 % (ref 37–47)
HGB BLD-MCNC: 10.8 G/DL (ref 12–16)
IGA SERPL-MCNC: 1297 MG/DL (ref 65–421)
IGG SERPL-MCNC: 636 MG/DL (ref 522–1631)
IGM SERPL-MCNC: 43 MG/DL (ref 33–293)
IMM GRANULOCYTES # BLD AUTO: 0.02 X10(3)/MCL (ref 0–0.04)
IMM GRANULOCYTES NFR BLD AUTO: 0.3 %
LYMPHOCYTES # BLD AUTO: 1.4 X10(3)/MCL (ref 0.6–4.6)
LYMPHOCYTES NFR BLD AUTO: 21 %
MCH RBC QN AUTO: 28 PG (ref 27–31)
MCHC RBC AUTO-ENTMCNC: 31.4 G/DL (ref 33–36)
MCV RBC AUTO: 89.1 FL (ref 80–94)
MONOCYTES # BLD AUTO: 0.51 X10(3)/MCL (ref 0.1–1.3)
MONOCYTES NFR BLD AUTO: 7.6 %
NEUTROPHILS # BLD AUTO: 4.44 X10(3)/MCL (ref 2.1–9.2)
NEUTROPHILS NFR BLD AUTO: 66.5 %
NRBC BLD AUTO-RTO: 0 %
PLATELET # BLD AUTO: 304 X10(3)/MCL (ref 130–400)
PMV BLD AUTO: 10.2 FL (ref 7.4–10.4)
POTASSIUM SERPL-SCNC: 4.4 MMOL/L (ref 3.5–5.1)
PROT SERPL-MCNC: 7.5 GM/DL (ref 6.4–8.3)
RBC # BLD AUTO: 3.86 X10(6)/MCL (ref 4.2–5.4)
SODIUM SERPL-SCNC: 141 MMOL/L (ref 136–145)
WBC # BLD AUTO: 6.68 X10(3)/MCL (ref 4.5–11.5)

## 2025-03-21 PROCEDURE — 86704 HEP B CORE ANTIBODY TOTAL: CPT

## 2025-03-21 PROCEDURE — 82784 ASSAY IGA/IGD/IGG/IGM EACH: CPT

## 2025-03-21 PROCEDURE — 36415 COLL VENOUS BLD VENIPUNCTURE: CPT

## 2025-03-21 PROCEDURE — 85025 COMPLETE CBC W/AUTO DIFF WBC: CPT

## 2025-03-21 PROCEDURE — 82306 VITAMIN D 25 HYDROXY: CPT

## 2025-03-21 PROCEDURE — 87340 HEPATITIS B SURFACE AG IA: CPT

## 2025-03-21 PROCEDURE — 80053 COMPREHEN METABOLIC PANEL: CPT

## 2025-03-22 LAB
HBV CORE AB SERPL QL IA: NONREACTIVE
HBV SURFACE AG SERPL QL IA: NONREACTIVE

## 2025-03-28 ENCOUNTER — RESULTS FOLLOW-UP (OUTPATIENT)
Dept: NEUROLOGY | Facility: CLINIC | Age: 45
End: 2025-03-28

## 2025-03-31 ENCOUNTER — TELEPHONE (OUTPATIENT)
Dept: NEUROLOGY | Facility: CLINIC | Age: 45
End: 2025-03-31
Payer: MEDICAID

## 2025-04-01 RX ORDER — SODIUM CHLORIDE 0.9 % (FLUSH) 0.9 %
10 SYRINGE (ML) INJECTION
Status: CANCELLED | OUTPATIENT
Start: 2025-04-01

## 2025-04-01 RX ORDER — METHYLPREDNISOLONE SOD SUCC 125 MG
100 VIAL (EA) INJECTION
Status: CANCELLED
Start: 2025-04-01

## 2025-04-01 RX ORDER — HEPARIN 100 UNIT/ML
500 SYRINGE INTRAVENOUS
Status: CANCELLED | OUTPATIENT
Start: 2025-04-01

## 2025-04-01 RX ORDER — EPINEPHRINE 0.3 MG/.3ML
0.3 INJECTION SUBCUTANEOUS
Status: CANCELLED | OUTPATIENT
Start: 2025-04-01

## 2025-04-01 RX ORDER — DIPHENHYDRAMINE HYDROCHLORIDE 50 MG/ML
50 INJECTION, SOLUTION INTRAMUSCULAR; INTRAVENOUS
Status: CANCELLED | OUTPATIENT
Start: 2025-04-01

## 2025-04-01 RX ORDER — DIPHENHYDRAMINE HYDROCHLORIDE 50 MG/ML
50 INJECTION, SOLUTION INTRAMUSCULAR; INTRAVENOUS
Status: CANCELLED
Start: 2025-04-01

## 2025-04-01 RX ORDER — FAMOTIDINE 10 MG/ML
20 INJECTION, SOLUTION INTRAVENOUS
Status: CANCELLED | OUTPATIENT
Start: 2025-04-01

## 2025-04-01 RX ORDER — ACETAMINOPHEN 500 MG
1000 TABLET ORAL
Status: CANCELLED | OUTPATIENT
Start: 2025-04-01

## 2025-04-02 ENCOUNTER — INFUSION (OUTPATIENT)
Dept: INFUSION THERAPY | Facility: HOSPITAL | Age: 45
End: 2025-04-02
Attending: PSYCHIATRY & NEUROLOGY
Payer: MEDICAID

## 2025-04-02 VITALS
TEMPERATURE: 98 F | HEIGHT: 62 IN | SYSTOLIC BLOOD PRESSURE: 124 MMHG | WEIGHT: 222.69 LBS | RESPIRATION RATE: 18 BRPM | BODY MASS INDEX: 40.98 KG/M2 | DIASTOLIC BLOOD PRESSURE: 72 MMHG | HEART RATE: 76 BPM

## 2025-04-02 DIAGNOSIS — G35 MS (MULTIPLE SCLEROSIS): Primary | ICD-10-CM

## 2025-04-02 PROCEDURE — 25000003 PHARM REV CODE 250: Performed by: PSYCHIATRY & NEUROLOGY

## 2025-04-02 PROCEDURE — 63600175 PHARM REV CODE 636 W HCPCS: Mod: JZ,TB | Performed by: PSYCHIATRY & NEUROLOGY

## 2025-04-02 PROCEDURE — 96413 CHEMO IV INFUSION 1 HR: CPT

## 2025-04-02 PROCEDURE — 96375 TX/PRO/DX INJ NEW DRUG ADDON: CPT

## 2025-04-02 PROCEDURE — 96415 CHEMO IV INFUSION ADDL HR: CPT

## 2025-04-02 RX ORDER — ACETAMINOPHEN 500 MG
1000 TABLET ORAL
Status: COMPLETED | OUTPATIENT
Start: 2025-04-02 | End: 2025-04-02

## 2025-04-02 RX ORDER — METHYLPREDNISOLONE SOD SUCC 125 MG
100 VIAL (EA) INJECTION
Start: 2025-09-03

## 2025-04-02 RX ORDER — SODIUM CHLORIDE 0.9 % (FLUSH) 0.9 %
10 SYRINGE (ML) INJECTION
Status: DISCONTINUED | OUTPATIENT
Start: 2025-04-02 | End: 2025-04-02 | Stop reason: HOSPADM

## 2025-04-02 RX ORDER — HEPARIN 100 UNIT/ML
500 SYRINGE INTRAVENOUS
OUTPATIENT
Start: 2025-09-03

## 2025-04-02 RX ORDER — FAMOTIDINE 10 MG/ML
20 INJECTION, SOLUTION INTRAVENOUS
Status: COMPLETED | OUTPATIENT
Start: 2025-04-02 | End: 2025-04-02

## 2025-04-02 RX ORDER — HEPARIN 100 UNIT/ML
500 SYRINGE INTRAVENOUS
Status: DISCONTINUED | OUTPATIENT
Start: 2025-04-02 | End: 2025-04-02 | Stop reason: HOSPADM

## 2025-04-02 RX ORDER — ACETAMINOPHEN 500 MG
1000 TABLET ORAL
OUTPATIENT
Start: 2025-09-03

## 2025-04-02 RX ORDER — DIPHENHYDRAMINE HYDROCHLORIDE 50 MG/ML
50 INJECTION, SOLUTION INTRAMUSCULAR; INTRAVENOUS
Start: 2025-09-03

## 2025-04-02 RX ORDER — FAMOTIDINE 10 MG/ML
20 INJECTION, SOLUTION INTRAVENOUS
OUTPATIENT
Start: 2025-09-03

## 2025-04-02 RX ORDER — DIPHENHYDRAMINE HYDROCHLORIDE 50 MG/ML
50 INJECTION, SOLUTION INTRAMUSCULAR; INTRAVENOUS
Status: COMPLETED | OUTPATIENT
Start: 2025-04-02 | End: 2025-04-02

## 2025-04-02 RX ORDER — METHYLPREDNISOLONE SOD SUCC 125 MG
100 VIAL (EA) INJECTION
Status: COMPLETED | OUTPATIENT
Start: 2025-04-02 | End: 2025-04-02

## 2025-04-02 RX ORDER — EPINEPHRINE 0.3 MG/.3ML
0.3 INJECTION SUBCUTANEOUS
OUTPATIENT
Start: 2025-09-03

## 2025-04-02 RX ORDER — SODIUM CHLORIDE 0.9 % (FLUSH) 0.9 %
10 SYRINGE (ML) INJECTION
OUTPATIENT
Start: 2025-09-03

## 2025-04-02 RX ORDER — DIPHENHYDRAMINE HYDROCHLORIDE 50 MG/ML
50 INJECTION, SOLUTION INTRAMUSCULAR; INTRAVENOUS
Status: DISCONTINUED | OUTPATIENT
Start: 2025-04-02 | End: 2025-04-02 | Stop reason: HOSPADM

## 2025-04-02 RX ORDER — DIPHENHYDRAMINE HYDROCHLORIDE 50 MG/ML
50 INJECTION, SOLUTION INTRAMUSCULAR; INTRAVENOUS
OUTPATIENT
Start: 2025-09-03

## 2025-04-02 RX ORDER — EPINEPHRINE 0.3 MG/.3ML
0.3 INJECTION SUBCUTANEOUS
Status: DISCONTINUED | OUTPATIENT
Start: 2025-04-02 | End: 2025-04-02 | Stop reason: HOSPADM

## 2025-04-02 RX ADMIN — DIPHENHYDRAMINE HYDROCHLORIDE 50 MG: 50 INJECTION INTRAMUSCULAR; INTRAVENOUS at 08:04

## 2025-04-02 RX ADMIN — ACETAMINOPHEN 1000 MG: 500 TABLET ORAL at 08:04

## 2025-04-02 RX ADMIN — OCRELIZUMAB 600 MG: 300 INJECTION INTRAVENOUS at 08:04

## 2025-04-02 RX ADMIN — FAMOTIDINE 20 MG: 10 INJECTION, SOLUTION INTRAVENOUS at 08:04

## 2025-04-02 RX ADMIN — METHYLPREDNISOLONE SODIUM SUCCINATE 100 MG: 125 INJECTION, POWDER, FOR SOLUTION INTRAMUSCULAR; INTRAVENOUS at 08:04

## 2025-04-23 ENCOUNTER — OFFICE VISIT (OUTPATIENT)
Dept: NEUROLOGY | Facility: CLINIC | Age: 45
End: 2025-04-23
Payer: MEDICAID

## 2025-04-23 ENCOUNTER — TELEPHONE (OUTPATIENT)
Dept: NEUROLOGY | Facility: CLINIC | Age: 45
End: 2025-04-23

## 2025-04-23 DIAGNOSIS — Z29.89 PROPHYLACTIC IMMUNOTHERAPY: ICD-10-CM

## 2025-04-23 DIAGNOSIS — G35 MULTIPLE SCLEROSIS: Primary | ICD-10-CM

## 2025-04-23 DIAGNOSIS — Z79.899 HIGH RISK MEDICATION USE: ICD-10-CM

## 2025-04-23 DIAGNOSIS — Z71.89 COUNSELING REGARDING GOALS OF CARE: ICD-10-CM

## 2025-04-23 PROCEDURE — G2211 COMPLEX E/M VISIT ADD ON: HCPCS | Mod: 95,,, | Performed by: CLINICAL NURSE SPECIALIST

## 2025-04-23 PROCEDURE — 98005 SYNCH AUDIO-VIDEO EST LOW 20: CPT | Mod: 95,,, | Performed by: CLINICAL NURSE SPECIALIST

## 2025-04-23 PROCEDURE — 1159F MED LIST DOCD IN RCRD: CPT | Mod: CPTII,95,, | Performed by: CLINICAL NURSE SPECIALIST

## 2025-04-23 NOTE — PROGRESS NOTES
Subjective:          Patient ID: Bettye Ch is a 44 y.o. female who presents today for a routine clinic visit for MS.  She was last seen in October 2024. The history has been provided by the patient.     The patient location is: her home   The chief complaint leading to consultation is: MS     Visit type: audiovisual    Face to Face time with patient: 12 minutes   25 minutes of total time spent on the encounter, which includes face to face time and non-face to face time preparing to see the patient (eg, review of tests), Obtaining and/or reviewing separately obtained history, Documenting clinical information in the electronic or other health record, Independently interpreting results (not separately reported) and communicating results to the patient/family/caregiver, or Care coordination (not separately reported).       Each patient to whom he or she provides medical services by telemedicine is:  (1) informed of the relationship between the physician and patient and the respective role of any other health care provider with respect to management of the patient; and (2) notified that he or she may decline to receive medical services by telemedicine and may withdraw from such care at any time.      MS HPI:  DMT: Ocrevus--last infused on 4/2/25; due next in October   Side effects from DMT? No  Taking vitamin D3 as recommended? 5000 units daily   She denies any recent infections.   She cleans up around the house to stay active, but does not formally exercise.   She has not done PT in the past year.   She denies any change in mobility in the past year or any falls.   She generally feels stable with no new symptoms.     Medications:  Current Outpatient Medications   Medication Sig    acetaminophen (TYLENOL) 325 MG tablet Take 325 mg by mouth every 6 (six) hours as needed for Pain.    CALCIUM CARBONATE/VITAMIN D3 (VITAMIN D-3 ORAL) Take 5,000 Units by mouth once daily.     iron,carbonyl (IRON CHEWS ORAL)  Take by mouth.    ocrelizumab (OCREVUS) 30 mg/mL Soln Infuse Ocrevus 600mg in 500mL of 0.9% NaCl IV every 24 weeks. Pre-meds: Solumedrol 100mg IVPB, Benadryl 50mg IVP, Tylenol 1000mg PO, Pepcid 20mg IVP. See attached protocol for dosing rate and infusion duration.     No current facility-administered medications for this visit.       SOCIAL HISTORY  Social History[1]    Living arrangements - the patient lives with her family    ROS:         REVIEW OF SYMPTOMS   Do you feel abnormally tired on most days? No--does not nap during the day   Do you feel you generally sleep well? Yes--generally sleeps well    Do you have difficulty controlling your bladder?  No   Do you have difficulty controlling your bowels?  No   Do you have frequent muscle cramps, tightness or spasms in your limbs?  No   Do you have new visual symptoms?  No--does not have a local eye doctor    Do you have worsening difficulty with your memory or thinking? No   Do you have worsening symptoms of anxiety or depression?  No   For patients who walk, Do you have more difficulty walking?  No   Have you fallen since your last visit?  No   For patients who use wheelchairs: Do you have any skin wounds or breakdown? No   Do you have difficulty using your hands?  No   Do you have shooting or burning pain? No   Do you have difficulty with sexual function?  Not assessed    If you are sexually active, are you using birth control? Y/N  N/A No   Do you often choke when swallowing liquids or solid food?  No   Do you experience worsening symptoms when overheated? No   Do you need any new equipment such as a wheelchair, walker or shower chair? No   Do you receive co-pay financial assistance for your principal MS medicine? No   Would you be interested in participating in an MS research trial in the future? No   For patients on Gilenya, Tecfidera, Aubagio, Rituxan, Ocrevus, Tysabri, Lemtrada or Methotrexate, are you aware that you should NOT receive live virus vaccines?   Yes   Do you feel you have adequate family/friend support?  Yes   Do you have health insurance?   Yes   Are you currently employed? No   Do you receive SSDI/SSI?  No   Do you use marijuana or cannabis products? No   Have you been diagnosed with a urinary tract infection since your last visit here? No   Have you been diagnosed with a respiratory tract infection since your last visit here? No   Have you been to the emergency room since your last visit here? No   Have you been hospitalized since your last visit here?  No              Objective:        1. 25 foot timed walk:      10/30/2023     1:40 PM 10/22/2024     1:40 PM   Timed 25 Foot Walk:   Did patient wear an AFO? No No   Was assistive device used? No No   Time for 25 Foot Walk (seconds) 7.9 7.9   Time for 25 Foot Walk (seconds) 6.9 7.9       Neurological Exam  Mental Status  Awake, alert and oriented to person, place and time. Speech is normal. Attention and concentration are normal.    Deferred   Imaging:     Results for orders placed during the hospital encounter of 10/22/24    MRI Brain Demyelinating Without Contrast    Impression  1. Stable degree of periventricular white matter lesions consistent with patient's known multiple sclerosis.  No new lesions.  2. Similar T2/STIR signal abnormality within the cervical cord.  No new lesions.  3. Additional findings, as above.    Electronically signed by resident: Hallie Grissom  Date:    10/22/2024  Time:    14:02    Electronically signed by: Levi Sharma MD  Date:    10/22/2024  Time:    16:09    Results for orders placed during the hospital encounter of 10/22/24    MRI Cervical Spine Demyelinating Without Contrast    Impression  1. Stable degree of periventricular white matter lesions consistent with patient's known multiple sclerosis.  No new lesions.  2. Similar T2/STIR signal abnormality within the cervical cord.  No new lesions.  3. Additional findings, as above.    Electronically signed by resident: Hallie  Knafl  Date:    10/22/2024  Time:    14:02    Electronically signed by: Levi Sharam MD  Date:    10/22/2024  Time:    16:09    Labs:     Lab Results   Component Value Date    SVXFEKKA78IP 50 03/21/2025    CHNJBWTF21FR 22.7 (L) 01/08/2024    RSAABKHD11CQ 27 (L) 09/19/2019       Lab Results   Component Value Date    IO1GABAG 72.6 03/15/2017    ABSOLUTECD3 1472 03/15/2017    PU2NCHES 18.1 03/15/2017    ABSOLUTECD8 367 03/15/2017    TI8SSSCG 52.4 03/15/2017    ABSOLUTECD4 1062 03/15/2017    LABCD48 2.89 03/15/2017     Lab Results   Component Value Date    WBC 6.68 03/21/2025    RBC 3.86 (L) 03/21/2025    HGB 10.8 (L) 03/21/2025    HCT 34.4 (L) 03/21/2025    MCV 89.1 03/21/2025    MCH 28.0 03/21/2025    MCHC 31.4 (L) 03/21/2025    RDW 14.5 03/21/2025     03/21/2025    MPV 10.2 03/21/2025    GRAN 3.3 09/19/2019    GRAN 61.8 09/19/2019    LYMPH 1.1 09/19/2019    LYMPH 21.1 09/19/2019    MONO 0.7 09/19/2019    MONO 12.5 09/19/2019    EOS 0.2 09/19/2019    BASO 0.04 09/19/2019    EOSINOPHIL 3.6 09/19/2019    BASOPHIL 0.8 09/19/2019     Sodium   Date Value Ref Range Status   03/21/2025 141 136 - 145 mmol/L Final   09/17/2018 139 136 - 145 mmol/L Final     Potassium   Date Value Ref Range Status   03/21/2025 4.4 3.5 - 5.1 mmol/L Final   09/17/2018 4.7 3.5 - 5.1 mmol/L Final     Chloride   Date Value Ref Range Status   03/21/2025 105 98 - 107 mmol/L Final   09/17/2018 105 95 - 110 mmol/L Final     CO2   Date Value Ref Range Status   03/21/2025 27 22 - 29 mmol/L Final   09/17/2018 25 23 - 29 mmol/L Final     Glucose   Date Value Ref Range Status   09/17/2018 82 70 - 110 mg/dL Final     BUN   Date Value Ref Range Status   09/17/2018 10 6 - 20 mg/dL Final     Blood Urea Nitrogen   Date Value Ref Range Status   03/21/2025 11.5 7.0 - 18.7 mg/dL Final     Creatinine   Date Value Ref Range Status   03/21/2025 0.62 0.55 - 1.02 mg/dL Final   09/17/2018 0.6 0.5 - 1.4 mg/dL Final     Calcium   Date Value Ref Range Status    03/21/2025 9.1 8.4 - 10.2 mg/dL Final   09/17/2018 9.9 8.7 - 10.5 mg/dL Final     Total Protein   Date Value Ref Range Status   09/17/2018 8.0 6.0 - 8.4 g/dL Final     Albumin   Date Value Ref Range Status   03/21/2025 3.6 3.5 - 5.0 g/dL Final   09/17/2018 4.0 3.5 - 5.2 g/dL Final     Total Bilirubin   Date Value Ref Range Status   09/17/2018 0.5 0.1 - 1.0 mg/dL Final     Comment:     For infants and newborns, interpretation of results should be based  on gestational age, weight and in agreement with clinical  observations.  Premature Infant recommended reference ranges:  Up to 24 hours.............<8.0 mg/dL  Up to 48 hours............<12.0 mg/dL  3-5 days..................<15.0 mg/dL  6-29 days.................<15.0 mg/dL       Bilirubin Total   Date Value Ref Range Status   03/21/2025 0.2 <=1.5 mg/dL Final     Alkaline Phosphatase   Date Value Ref Range Status   09/17/2018 90 55 - 135 U/L Final     ALP   Date Value Ref Range Status   03/21/2025 112 40 - 150 unit/L Final     AST   Date Value Ref Range Status   03/21/2025 11 11 - 45 unit/L Final   09/17/2018 12 10 - 40 U/L Final     ALT   Date Value Ref Range Status   03/21/2025 7 0 - 55 unit/L Final   09/17/2018 8 (L) 10 - 44 U/L Final     Anion Gap   Date Value Ref Range Status   09/17/2018 9 8 - 16 mmol/L Final     eGFR if    Date Value Ref Range Status   09/17/2018 >60.0 >60 mL/min/1.73 m^2 Final     eGFR if non    Date Value Ref Range Status   09/17/2018 >60.0 >60 mL/min/1.73 m^2 Final     Comment:     Calculation used to obtain the estimated glomerular filtration  rate (eGFR) is the CKD-EPI equation.        Lab Results   Component Value Date    HEPBSAG Nonreactive 03/21/2025    HEPBSAB Reactive (A) 06/21/2022    HEPBCAB Nonreactive 03/21/2025           MS Impression and Plan:     NEURO MULTIPLE SCLEROSIS IMPRESSION:   Number of relapses in the past year?:  0  Clinical Progression:  Clinically Stable  MS Classification:   Relapsing-Remitting MS  Current DMT: ocrelizumab  Current DMT comment: Continue Ocrevus and Vitamin D. Her next infusion is due in early October. We will check safety labs in early September in [place]. She is aware of the risks associated with immunosuppressant therapy, including increased risk of infection.     DMT:  No change in management  Symptom Management:  No change in symptom management  Symptom Management comment: If she would like to pursue physical therapy, we are always happy to order.   Additional Impressions:   MRI brain is due in October.   She will follow up in October with Dr. Gonzalez. Will try to coordinate with hematology f/u and Dr. Garcia f/u if possible.   The visit today is associated with current or anticipated ongoing medical care related to this patient's single serious condition/complex condition of multiple sclerosis.        LAMAR Orellana, CNS    Problem List Items Addressed This Visit       Prophylactic immunotherapy     Other Visit Diagnoses         Multiple sclerosis    -  Primary      Counseling regarding goals of care          High risk medication use                     [1]   Social History  Tobacco Use    Smoking status: Never    Smokeless tobacco: Never   Substance Use Topics    Alcohol use: No    Drug use: No

## 2025-04-23 NOTE — Clinical Note
Labs in early September in [place] MRI brain and f/u here with BB in October (I've also reached out to hematology and Dr. Garcia's office to coordinate appts on the same day if possible).

## 2025-04-23 NOTE — Clinical Note
Eric! Dr. Malone ordered labs for this patient to be done in September of this year with a follow up a week after. Now that he is gone, can we get her scheduled with another provider? We are hoping to coordinate her MRI, MS center follow up, and hematology f/u the same day in early October if possible. She can do labs in [place] in September if someone is able to schedule these for her. Thanks!  Anju

## 2025-04-23 NOTE — Clinical Note
Dr. Radha Parr! You saw Bettye back in 2020 for a pituitary adenoma, but she did not follow up. She has been getting annual MRIs with us in [facility]. Her next MRI will be due in October. Would you like to see her after the MRI? We are trying to coordinate all on the same day. She is also open to a virtual visit. If no need to see her, I can let her know. Thanks for your help!  Anju

## 2025-04-23 NOTE — TELEPHONE ENCOUNTER
Pt is scheduled for labs on 9/8/25, Advised pt mother that hematology and 's office will be reaching out soon to have pt scheduled in October follow MRI and f/u with BERTIN.

## 2025-04-24 DIAGNOSIS — G35 MULTIPLE SCLEROSIS: Primary | ICD-10-CM

## 2025-05-13 ENCOUNTER — PATIENT MESSAGE (OUTPATIENT)
Dept: PSYCHIATRY | Facility: CLINIC | Age: 45
End: 2025-05-13
Payer: MEDICAID

## 2025-06-19 ENCOUNTER — PATIENT MESSAGE (OUTPATIENT)
Dept: PSYCHIATRY | Facility: CLINIC | Age: 45
End: 2025-06-19
Payer: MEDICAID

## 2025-07-09 DIAGNOSIS — D47.2 MGUS (MONOCLONAL GAMMOPATHY OF UNKNOWN SIGNIFICANCE): Primary | ICD-10-CM

## 2025-07-09 DIAGNOSIS — D50.9 IRON DEFICIENCY ANEMIA, UNSPECIFIED IRON DEFICIENCY ANEMIA TYPE: ICD-10-CM

## 2025-07-23 ENCOUNTER — TELEPHONE (OUTPATIENT)
Dept: NEUROLOGY | Facility: CLINIC | Age: 45
End: 2025-07-23
Payer: MEDICAID

## 2025-07-30 ENCOUNTER — PATIENT MESSAGE (OUTPATIENT)
Dept: PSYCHIATRY | Facility: CLINIC | Age: 45
End: 2025-07-30
Payer: MEDICAID

## 2025-08-01 ENCOUNTER — PATIENT MESSAGE (OUTPATIENT)
Dept: PSYCHIATRY | Facility: CLINIC | Age: 45
End: 2025-08-01
Payer: MEDICAID

## 2025-08-18 ENCOUNTER — PATIENT MESSAGE (OUTPATIENT)
Dept: NEUROLOGY | Facility: CLINIC | Age: 45
End: 2025-08-18
Payer: MEDICAID